# Patient Record
Sex: MALE | Race: WHITE | Employment: OTHER | URBAN - METROPOLITAN AREA
[De-identification: names, ages, dates, MRNs, and addresses within clinical notes are randomized per-mention and may not be internally consistent; named-entity substitution may affect disease eponyms.]

---

## 2017-01-03 ENCOUNTER — TELEPHONE (OUTPATIENT)
Dept: INTERNAL MEDICINE CLINIC | Age: 82
End: 2017-01-03

## 2017-01-09 ENCOUNTER — TELEPHONE (OUTPATIENT)
Dept: INTERNAL MEDICINE CLINIC | Age: 82
End: 2017-01-09

## 2017-01-09 DIAGNOSIS — N28.9 RENAL INSUFFICIENCY: Primary | ICD-10-CM

## 2017-01-12 ENCOUNTER — HOSPITAL ENCOUNTER (OUTPATIENT)
Dept: GENERAL RADIOLOGY | Age: 82
Discharge: HOME | End: 2017-01-12
Attending: INTERNAL MEDICINE | Admitting: INTERNAL MEDICINE

## 2017-01-12 LAB
ALBUMIN SERPL-MCNC: 4.1 G/DL (ref 3.4–5)
ANION GAP SERPL CALCULATED.3IONS-SCNC: 14 MMOL/L (ref 3–16)
BUN BLDV-MCNC: 22 MG/DL (ref 7–20)
CALCIUM SERPL-MCNC: 9.7 MG/DL (ref 8.3–10.6)
CHLORIDE BLD-SCNC: 103 MMOL/L (ref 99–110)
CO2: 25 MMOL/L (ref 21–32)
CREAT SERPL-MCNC: 2.2 MG/DL (ref 0.8–1.3)
GFR AFRICAN AMERICAN: 35
GFR NON-AFRICAN AMERICAN: 29
GLUCOSE BLD-MCNC: 115 MG/DL (ref 70–99)
PHOSPHORUS: 3.5 MG/DL (ref 2.5–4.9)
POTASSIUM SERPL-SCNC: 4.2 MMOL/L (ref 3.5–5.1)
SODIUM BLD-SCNC: 142 MMOL/L (ref 136–145)

## 2017-02-16 RX ORDER — GLIPIZIDE 10 MG/1
TABLET ORAL
Qty: 30 TABLET | Refills: 3 | Status: SHIPPED | OUTPATIENT
Start: 2017-02-16 | End: 2017-04-21 | Stop reason: SDUPTHER

## 2017-03-03 ENCOUNTER — HOSPITAL ENCOUNTER (OUTPATIENT)
Dept: GENERAL RADIOLOGY | Age: 82
Discharge: HOME | End: 2017-03-03
Attending: INTERNAL MEDICINE | Admitting: INTERNAL MEDICINE

## 2017-03-03 LAB
ALBUMIN SERPL-MCNC: 3.8 G/DL (ref 3.4–5)
ANION GAP SERPL CALCULATED.3IONS-SCNC: 19 MMOL/L (ref 3–16)
BUN BLDV-MCNC: 39 MG/DL (ref 7–20)
CALCIUM SERPL-MCNC: 9.5 MG/DL (ref 8.3–10.6)
CHLORIDE BLD-SCNC: 104 MMOL/L (ref 99–110)
CO2: 21 MMOL/L (ref 21–32)
CREAT SERPL-MCNC: 3 MG/DL (ref 0.8–1.3)
GFR AFRICAN AMERICAN: 24
GFR NON-AFRICAN AMERICAN: 20
GLUCOSE BLD-MCNC: 159 MG/DL (ref 70–99)
PHOSPHORUS: 3.7 MG/DL (ref 2.5–4.9)
POTASSIUM SERPL-SCNC: 4.2 MMOL/L (ref 3.5–5.1)
SODIUM BLD-SCNC: 144 MMOL/L (ref 136–145)

## 2017-03-07 ENCOUNTER — OFFICE VISIT (OUTPATIENT)
Dept: DERMATOLOGY | Age: 82
End: 2017-03-07

## 2017-03-07 DIAGNOSIS — L82.1 SEBORRHEIC KERATOSES: ICD-10-CM

## 2017-03-07 DIAGNOSIS — L57.0 AK (ACTINIC KERATOSIS): ICD-10-CM

## 2017-03-07 DIAGNOSIS — D22.9 BENIGN NEVUS: ICD-10-CM

## 2017-03-07 DIAGNOSIS — D48.5 NEOPLASM OF UNCERTAIN BEHAVIOR OF SKIN: Primary | ICD-10-CM

## 2017-03-07 PROCEDURE — 17003 DESTRUCT PREMALG LES 2-14: CPT | Performed by: DERMATOLOGY

## 2017-03-07 PROCEDURE — G8484 FLU IMMUNIZE NO ADMIN: HCPCS | Performed by: DERMATOLOGY

## 2017-03-07 PROCEDURE — G8420 CALC BMI NORM PARAMETERS: HCPCS | Performed by: DERMATOLOGY

## 2017-03-07 PROCEDURE — G8427 DOCREV CUR MEDS BY ELIG CLIN: HCPCS | Performed by: DERMATOLOGY

## 2017-03-07 PROCEDURE — 17000 DESTRUCT PREMALG LESION: CPT | Performed by: DERMATOLOGY

## 2017-03-07 PROCEDURE — 1123F ACP DISCUSS/DSCN MKR DOCD: CPT | Performed by: DERMATOLOGY

## 2017-03-07 PROCEDURE — 11100 PR BIOPSY OF SKIN LESION: CPT | Performed by: DERMATOLOGY

## 2017-03-07 PROCEDURE — 99213 OFFICE O/P EST LOW 20 MIN: CPT | Performed by: DERMATOLOGY

## 2017-03-07 PROCEDURE — 1036F TOBACCO NON-USER: CPT | Performed by: DERMATOLOGY

## 2017-03-07 PROCEDURE — 4040F PNEUMOC VAC/ADMIN/RCVD: CPT | Performed by: DERMATOLOGY

## 2017-03-13 ENCOUNTER — TELEPHONE (OUTPATIENT)
Dept: DERMATOLOGY | Age: 82
End: 2017-03-13

## 2017-03-21 ENCOUNTER — OFFICE VISIT (OUTPATIENT)
Dept: DERMATOLOGY | Age: 82
End: 2017-03-21

## 2017-03-21 DIAGNOSIS — C44.519 BASAL CELL CARCINOMA OF BACK: Primary | ICD-10-CM

## 2017-03-21 PROCEDURE — 1036F TOBACCO NON-USER: CPT | Performed by: DERMATOLOGY

## 2017-03-21 PROCEDURE — 17261 DSTRJ MAL LES T/A/L .6-1.0CM: CPT | Performed by: DERMATOLOGY

## 2017-04-03 ENCOUNTER — TELEPHONE (OUTPATIENT)
Dept: INTERNAL MEDICINE CLINIC | Age: 82
End: 2017-04-03

## 2017-04-03 ENCOUNTER — HOSPITAL ENCOUNTER (OUTPATIENT)
Dept: ULTRASOUND IMAGING | Age: 82
Discharge: HOME | End: 2017-04-03
Attending: INTERNAL MEDICINE | Admitting: INTERNAL MEDICINE

## 2017-04-03 DIAGNOSIS — N18.4 CHRONIC KIDNEY DISEASE, STAGE IV (SEVERE) (HCC): ICD-10-CM

## 2017-04-10 ENCOUNTER — HOSPITAL ENCOUNTER (OUTPATIENT)
Dept: CT IMAGING | Age: 82
Discharge: HOME | End: 2017-04-10
Attending: INTERNAL MEDICINE | Admitting: INTERNAL MEDICINE

## 2017-04-10 DIAGNOSIS — N18.4 CHRONIC KIDNEY DISEASE, STAGE IV (SEVERE) (HCC): ICD-10-CM

## 2017-04-10 DIAGNOSIS — R93.89 ABNORMAL FINDINGS ON DIAGNOSTIC IMAGING OF OTHER SPECIFIED BODY STRUCTURES: ICD-10-CM

## 2017-04-12 ENCOUNTER — HOSPITAL ENCOUNTER (OUTPATIENT)
Dept: GENERAL RADIOLOGY | Age: 82
Discharge: HOME | End: 2017-04-12
Attending: INTERNAL MEDICINE | Admitting: INTERNAL MEDICINE

## 2017-04-12 LAB
ALBUMIN SERPL-MCNC: 4 G/DL (ref 3.4–5)
ANION GAP SERPL CALCULATED.3IONS-SCNC: 24 MMOL/L (ref 3–16)
BACTERIA: ABNORMAL /HPF
BASOPHILS ABSOLUTE: 0.2 K/UL (ref 0–0.2)
BASOPHILS RELATIVE PERCENT: 1 %
BILIRUBIN URINE: NEGATIVE
BLOOD, URINE: ABNORMAL
BUN BLDV-MCNC: 58 MG/DL (ref 7–20)
C3 COMPLEMENT: 198.6 MG/DL (ref 90–180)
C4 COMPLEMENT: 52.8 MG/DL (ref 10–40)
CALCIUM SERPL-MCNC: 10.2 MG/DL (ref 8.3–10.6)
CASTS: ABNORMAL /LPF
CHLORIDE BLD-SCNC: 97 MMOL/L (ref 99–110)
CLARITY: CLEAR
CO2: 20 MMOL/L (ref 21–32)
COLOR: YELLOW
CREAT SERPL-MCNC: 3.7 MG/DL (ref 0.8–1.3)
CREATININE URINE: 124.9 MG/DL (ref 39–259)
EOSINOPHILS ABSOLUTE: 0.2 K/UL (ref 0–0.6)
EOSINOPHILS RELATIVE PERCENT: 1 %
GFR AFRICAN AMERICAN: 19
GFR NON-AFRICAN AMERICAN: 16
GLUCOSE BLD-MCNC: 146 MG/DL (ref 70–99)
GLUCOSE URINE: NEGATIVE MG/DL
HAV IGM SER IA-ACNC: NORMAL
HCT VFR BLD CALC: 44 % (ref 40.5–52.5)
HEMATOLOGY PATH CONSULT: YES
HEMOGLOBIN: 14.3 G/DL (ref 13.5–17.5)
HEPATITIS B CORE IGM ANTIBODY: NORMAL
HEPATITIS B SURFACE ANTIGEN INTERPRETATION: NORMAL
HEPATITIS C ANTIBODY INTERPRETATION: NORMAL
KETONES, URINE: ABNORMAL MG/DL
LEUKOCYTE ESTERASE, URINE: NEGATIVE
LYMPHOCYTES ABSOLUTE: 7.3 K/UL (ref 1–5.1)
LYMPHOCYTES RELATIVE PERCENT: 35 %
MCH RBC QN AUTO: 28.9 PG (ref 26–34)
MCHC RBC AUTO-ENTMCNC: 32.4 G/DL (ref 31–36)
MCV RBC AUTO: 89.2 FL (ref 80–100)
MICROSCOPIC EXAMINATION: YES
MONOCYTES ABSOLUTE: 1 K/UL (ref 0–1.3)
MONOCYTES RELATIVE PERCENT: 5 %
NEUTROPHILS ABSOLUTE: 12.1 K/UL (ref 1.7–7.7)
NEUTROPHILS RELATIVE PERCENT: 58 %
NITRITE, URINE: NEGATIVE
PARATHYROID HORMONE INTACT: 27.2 PG/ML (ref 14–72)
PDW BLD-RTO: 15.1 % (ref 12.4–15.4)
PH UA: 5.5
PHOSPHORUS: 3 MG/DL (ref 2.5–4.9)
PLATELET # BLD: 288 K/UL (ref 135–450)
PLATELET SLIDE REVIEW: ADEQUATE
PMV BLD AUTO: 8.3 FL (ref 5–10.5)
POTASSIUM SERPL-SCNC: 4.8 MMOL/L (ref 3.5–5.1)
PROTEIN PROTEIN: 0.13 G/DL
PROTEIN PROTEIN: 134 MG/DL
PROTEIN UA: 100 MG/DL
RBC # BLD: 4.93 M/UL (ref 4.2–5.9)
RBC # BLD: NORMAL 10*6/UL
RBC UA: ABNORMAL /HPF (ref 0–2)
SLIDE REVIEW: ABNORMAL
SODIUM BLD-SCNC: 141 MMOL/L (ref 136–145)
SPECIFIC GRAVITY UA: 1.02
UROBILINOGEN, URINE: 0.2 E.U./DL
VITAMIN D 25-HYDROXY: 58.4 NG/ML
WBC # BLD: 20.8 K/UL (ref 4–11)
WBC UA: ABNORMAL /HPF (ref 0–5)

## 2017-04-13 LAB
ANA INTERPRETATION: NORMAL
ANTI-NUCLEAR ANTIBODY (ANA): NEGATIVE
ESTIMATED AVERAGE GLUCOSE: 162.8 MG/DL
HBA1C MFR BLD: 7.3 %
HEMATOLOGY PATH CONSULT: NORMAL

## 2017-04-14 LAB
ALBUMIN SERPL-MCNC: 3.1 G/DL (ref 3.1–4.9)
ALPHA-1-GLOBULIN: 0.5 G/DL (ref 0.2–0.4)
ALPHA-2-GLOBULIN: 1.5 G/DL (ref 0.4–1.1)
ANCA IFA: NORMAL
BETA GLOBULIN: 0 AU/ML (ref 0–19)
BETA GLOBULIN: 1.1 G/DL (ref 0.9–1.6)
GAMMA GLOBULIN: 0.6 G/DL (ref 0.6–1.8)
KAPPA, FREE LIGHT CHAINS, SERUM: 53.06 MG/L (ref 3.3–19.4)
KAPPA/LAMBDA RATIO: 1.65 (ref 0.26–1.65)
KAPPA/LAMBDA TEST COMMENT: ABNORMAL
LAMBDA, FREE LIGHT CHAINS, SERUM: 32.09 MG/L (ref 5.71–26.3)
TOTAL PROTEIN: 6.7 G/DL (ref 6.4–8.2)

## 2017-04-17 LAB
SPE/IFE INTERPRETATION: NORMAL
URINE ELECTROPHORESIS INTERP: NORMAL

## 2017-04-20 ENCOUNTER — TELEPHONE (OUTPATIENT)
Dept: CASE MANAGEMENT | Age: 82
End: 2017-04-20

## 2017-04-20 ENCOUNTER — OFFICE VISIT (OUTPATIENT)
Dept: INTERNAL MEDICINE CLINIC | Age: 82
End: 2017-04-20

## 2017-04-20 VITALS
BODY MASS INDEX: 22.44 KG/M2 | TEMPERATURE: 97.7 F | SYSTOLIC BLOOD PRESSURE: 120 MMHG | WEIGHT: 143 LBS | HEIGHT: 67 IN | DIASTOLIC BLOOD PRESSURE: 72 MMHG

## 2017-04-20 DIAGNOSIS — E11.65 TYPE 2 DIABETES MELLITUS WITH HYPERGLYCEMIA, UNSPECIFIED LONG TERM INSULIN USE STATUS: ICD-10-CM

## 2017-04-20 DIAGNOSIS — E88.81 DYSMETABOLIC SYNDROME X: ICD-10-CM

## 2017-04-20 DIAGNOSIS — E78.2 MIXED HYPERLIPIDEMIA: ICD-10-CM

## 2017-04-20 DIAGNOSIS — I10 ESSENTIAL HYPERTENSION: ICD-10-CM

## 2017-04-20 DIAGNOSIS — G25.0 BENIGN ESSENTIAL TREMOR: ICD-10-CM

## 2017-04-20 DIAGNOSIS — R01.1 MURMUR, CARDIAC: ICD-10-CM

## 2017-04-20 DIAGNOSIS — R00.1 BRADYCARDIA: ICD-10-CM

## 2017-04-20 DIAGNOSIS — N20.0 KIDNEY STONE: Primary | ICD-10-CM

## 2017-04-20 DIAGNOSIS — N40.0 BENIGN PROSTATIC HYPERPLASIA WITHOUT LOWER URINARY TRACT SYMPTOMS, UNSPECIFIED MORPHOLOGY: ICD-10-CM

## 2017-04-20 DIAGNOSIS — Z01.818 PRE-OP EXAM: ICD-10-CM

## 2017-04-20 DIAGNOSIS — I34.1 MVP (MITRAL VALVE PROLAPSE): ICD-10-CM

## 2017-04-20 DIAGNOSIS — N28.9 RENAL INSUFFICIENCY: ICD-10-CM

## 2017-04-20 PROCEDURE — 99214 OFFICE O/P EST MOD 30 MIN: CPT | Performed by: NURSE PRACTITIONER

## 2017-04-20 PROCEDURE — G8419 CALC BMI OUT NRM PARAM NOF/U: HCPCS | Performed by: NURSE PRACTITIONER

## 2017-04-20 PROCEDURE — 93000 ELECTROCARDIOGRAM COMPLETE: CPT | Performed by: NURSE PRACTITIONER

## 2017-04-20 PROCEDURE — 4040F PNEUMOC VAC/ADMIN/RCVD: CPT | Performed by: NURSE PRACTITIONER

## 2017-04-20 PROCEDURE — 1036F TOBACCO NON-USER: CPT | Performed by: NURSE PRACTITIONER

## 2017-04-20 PROCEDURE — 1123F ACP DISCUSS/DSCN MKR DOCD: CPT | Performed by: NURSE PRACTITIONER

## 2017-04-20 PROCEDURE — G8427 DOCREV CUR MEDS BY ELIG CLIN: HCPCS | Performed by: NURSE PRACTITIONER

## 2017-04-21 RX ORDER — GLIPIZIDE 10 MG/1
TABLET ORAL
Qty: 30 TABLET | Refills: 5 | Status: SHIPPED | OUTPATIENT
Start: 2017-04-21 | End: 2017-12-18 | Stop reason: SDUPTHER

## 2017-05-11 ENCOUNTER — HOSPITAL ENCOUNTER (OUTPATIENT)
Dept: GENERAL RADIOLOGY | Age: 82
Discharge: HOME | End: 2017-05-11
Attending: INTERNAL MEDICINE | Admitting: INTERNAL MEDICINE

## 2017-05-11 LAB
ALBUMIN SERPL-MCNC: 3.9 G/DL (ref 3.4–5)
ANION GAP SERPL CALCULATED.3IONS-SCNC: 13 MMOL/L (ref 3–16)
BASOPHILS ABSOLUTE: 0 K/UL (ref 0–0.2)
BASOPHILS RELATIVE PERCENT: 0 %
BUN BLDV-MCNC: 23 MG/DL (ref 7–20)
CALCIUM SERPL-MCNC: 9.4 MG/DL (ref 8.3–10.6)
CHLORIDE BLD-SCNC: 100 MMOL/L (ref 99–110)
CO2: 26 MMOL/L (ref 21–32)
CREAT SERPL-MCNC: 1.8 MG/DL (ref 0.8–1.3)
EOSINOPHILS ABSOLUTE: 0.2 K/UL (ref 0–0.6)
EOSINOPHILS RELATIVE PERCENT: 2 %
GFR AFRICAN AMERICAN: 44
GFR NON-AFRICAN AMERICAN: 36
GLUCOSE BLD-MCNC: 195 MG/DL (ref 70–99)
HCT VFR BLD CALC: 38.2 % (ref 40.5–52.5)
HEMATOLOGY PATH CONSULT: NO
HEMOGLOBIN: 12.4 G/DL (ref 13.5–17.5)
LYMPHOCYTES ABSOLUTE: 5.9 K/UL (ref 1–5.1)
LYMPHOCYTES RELATIVE PERCENT: 59 %
MCH RBC QN AUTO: 29.5 PG (ref 26–34)
MCHC RBC AUTO-ENTMCNC: 32.6 G/DL (ref 31–36)
MCV RBC AUTO: 90.4 FL (ref 80–100)
MONOCYTES ABSOLUTE: 0.4 K/UL (ref 0–1.3)
MONOCYTES RELATIVE PERCENT: 4 %
NEUTROPHILS ABSOLUTE: 3.5 K/UL (ref 1.7–7.7)
NEUTROPHILS RELATIVE PERCENT: 35 %
PDW BLD-RTO: 16.3 % (ref 12.4–15.4)
PHOSPHORUS: 2.6 MG/DL (ref 2.5–4.9)
PLATELET # BLD: 134 K/UL (ref 135–450)
PLATELET SLIDE REVIEW: ABNORMAL
PMV BLD AUTO: 8.6 FL (ref 5–10.5)
POTASSIUM SERPL-SCNC: 3.9 MMOL/L (ref 3.5–5.1)
RBC # BLD: 4.22 M/UL (ref 4.2–5.9)
SLIDE REVIEW: ABNORMAL
SODIUM BLD-SCNC: 139 MMOL/L (ref 136–145)
WBC # BLD: 10 K/UL (ref 4–11)

## 2017-05-23 DIAGNOSIS — E11.65 TYPE 2 DIABETES MELLITUS WITH HYPERGLYCEMIA, UNSPECIFIED LONG TERM INSULIN USE STATUS: ICD-10-CM

## 2017-05-23 RX ORDER — CALCIUM CITRATE/VITAMIN D3 200MG-6.25
TABLET ORAL
Qty: 100 STRIP | Refills: 2 | Status: SHIPPED | OUTPATIENT
Start: 2017-05-23 | End: 2018-01-28 | Stop reason: SDUPTHER

## 2017-06-22 ENCOUNTER — TELEPHONE (OUTPATIENT)
Dept: INTERNAL MEDICINE CLINIC | Age: 82
End: 2017-06-22

## 2017-06-22 ENCOUNTER — TELEPHONE (OUTPATIENT)
Dept: PULMONOLOGY | Age: 82
End: 2017-06-22

## 2017-06-22 DIAGNOSIS — R91.1 LUNG NODULE: Primary | ICD-10-CM

## 2017-06-27 ENCOUNTER — OFFICE VISIT (OUTPATIENT)
Dept: PULMONOLOGY | Age: 82
End: 2017-06-27

## 2017-06-27 VITALS
WEIGHT: 153 LBS | HEART RATE: 51 BPM | TEMPERATURE: 97 F | OXYGEN SATURATION: 97 % | HEIGHT: 66 IN | BODY MASS INDEX: 24.59 KG/M2 | SYSTOLIC BLOOD PRESSURE: 134 MMHG | RESPIRATION RATE: 16 BRPM | DIASTOLIC BLOOD PRESSURE: 69 MMHG

## 2017-06-27 DIAGNOSIS — R91.1 PULMONARY NODULE: Primary | ICD-10-CM

## 2017-06-27 PROCEDURE — 1123F ACP DISCUSS/DSCN MKR DOCD: CPT | Performed by: INTERNAL MEDICINE

## 2017-06-27 PROCEDURE — 4040F PNEUMOC VAC/ADMIN/RCVD: CPT | Performed by: INTERNAL MEDICINE

## 2017-06-27 PROCEDURE — G8427 DOCREV CUR MEDS BY ELIG CLIN: HCPCS | Performed by: INTERNAL MEDICINE

## 2017-06-27 PROCEDURE — 99203 OFFICE O/P NEW LOW 30 MIN: CPT | Performed by: INTERNAL MEDICINE

## 2017-06-27 PROCEDURE — G8420 CALC BMI NORM PARAMETERS: HCPCS | Performed by: INTERNAL MEDICINE

## 2017-06-27 PROCEDURE — 1036F TOBACCO NON-USER: CPT | Performed by: INTERNAL MEDICINE

## 2017-06-27 RX ORDER — ACETAMINOPHEN 160 MG
TABLET,DISINTEGRATING ORAL
COMMUNITY

## 2017-06-29 ENCOUNTER — HOSPITAL ENCOUNTER (OUTPATIENT)
Dept: CT IMAGING | Age: 82
Discharge: HOME | End: 2017-06-29
Attending: INTERNAL MEDICINE | Admitting: INTERNAL MEDICINE

## 2017-06-29 ENCOUNTER — OFFICE VISIT (OUTPATIENT)
Dept: INTERNAL MEDICINE CLINIC | Age: 82
End: 2017-06-29

## 2017-06-29 VITALS
BODY MASS INDEX: 24.37 KG/M2 | HEART RATE: 73 BPM | SYSTOLIC BLOOD PRESSURE: 116 MMHG | WEIGHT: 151 LBS | OXYGEN SATURATION: 96 % | DIASTOLIC BLOOD PRESSURE: 82 MMHG

## 2017-06-29 DIAGNOSIS — I10 ESSENTIAL HYPERTENSION: Primary | ICD-10-CM

## 2017-06-29 DIAGNOSIS — Z23 NEED FOR PROPHYLACTIC VACCINATION AGAINST STREPTOCOCCUS PNEUMONIAE (PNEUMOCOCCUS): ICD-10-CM

## 2017-06-29 DIAGNOSIS — R91.1 PULMONARY NODULE: ICD-10-CM

## 2017-06-29 DIAGNOSIS — E11.65 TYPE 2 DIABETES MELLITUS WITH HYPERGLYCEMIA, UNSPECIFIED LONG TERM INSULIN USE STATUS: ICD-10-CM

## 2017-06-29 DIAGNOSIS — R91.1 SOLITARY PULMONARY NODULE: ICD-10-CM

## 2017-06-29 DIAGNOSIS — E78.2 MIXED HYPERLIPIDEMIA: ICD-10-CM

## 2017-06-29 DIAGNOSIS — N28.9 RENAL INSUFFICIENCY: ICD-10-CM

## 2017-06-29 PROCEDURE — G8420 CALC BMI NORM PARAMETERS: HCPCS | Performed by: INTERNAL MEDICINE

## 2017-06-29 PROCEDURE — 1036F TOBACCO NON-USER: CPT | Performed by: INTERNAL MEDICINE

## 2017-06-29 PROCEDURE — G8427 DOCREV CUR MEDS BY ELIG CLIN: HCPCS | Performed by: INTERNAL MEDICINE

## 2017-06-29 PROCEDURE — 4040F PNEUMOC VAC/ADMIN/RCVD: CPT | Performed by: INTERNAL MEDICINE

## 2017-06-29 PROCEDURE — 1123F ACP DISCUSS/DSCN MKR DOCD: CPT | Performed by: INTERNAL MEDICINE

## 2017-06-29 PROCEDURE — 90732 PPSV23 VACC 2 YRS+ SUBQ/IM: CPT | Performed by: INTERNAL MEDICINE

## 2017-06-29 PROCEDURE — G0009 ADMIN PNEUMOCOCCAL VACCINE: HCPCS | Performed by: INTERNAL MEDICINE

## 2017-06-29 PROCEDURE — 99214 OFFICE O/P EST MOD 30 MIN: CPT | Performed by: INTERNAL MEDICINE

## 2017-06-29 ASSESSMENT — ENCOUNTER SYMPTOMS
BACK PAIN: 0
CHEST TIGHTNESS: 0
DIARRHEA: 0
COUGH: 0
ABDOMINAL DISTENTION: 0
WHEEZING: 0
SHORTNESS OF BREATH: 0
NAUSEA: 0
CONSTIPATION: 0
VOMITING: 0

## 2017-06-29 ASSESSMENT — PATIENT HEALTH QUESTIONNAIRE - PHQ9
SUM OF ALL RESPONSES TO PHQ QUESTIONS 1-9: 2
2. FEELING DOWN, DEPRESSED OR HOPELESS: 1
SUM OF ALL RESPONSES TO PHQ9 QUESTIONS 1 & 2: 2
1. LITTLE INTEREST OR PLEASURE IN DOING THINGS: 1

## 2017-06-30 ENCOUNTER — TELEPHONE (OUTPATIENT)
Dept: PULMONOLOGY | Age: 82
End: 2017-06-30

## 2017-06-30 DIAGNOSIS — R91.1 PULMONARY NODULE: Primary | ICD-10-CM

## 2017-07-03 RX ORDER — PROPRANOLOL HYDROCHLORIDE 20 MG/1
TABLET ORAL
Qty: 90 TABLET | Refills: 0 | Status: SHIPPED | OUTPATIENT
Start: 2017-07-03 | End: 2017-09-29 | Stop reason: SDUPTHER

## 2017-07-05 ENCOUNTER — HOSPITAL ENCOUNTER (OUTPATIENT)
Dept: GENERAL RADIOLOGY | Age: 82
Discharge: HOME | End: 2017-07-05
Attending: INTERNAL MEDICINE | Admitting: INTERNAL MEDICINE

## 2017-07-05 DIAGNOSIS — E78.2 MIXED HYPERLIPIDEMIA: ICD-10-CM

## 2017-07-05 LAB
ALBUMIN SERPL-MCNC: 4.2 G/DL (ref 3.4–5)
ALBUMIN SERPL-MCNC: 4.4 G/DL (ref 3.4–5)
ALP BLD-CCNC: 43 U/L (ref 40–129)
ALT SERPL-CCNC: 15 U/L (ref 10–40)
ANION GAP SERPL CALCULATED.3IONS-SCNC: 17 MMOL/L (ref 3–16)
AST SERPL-CCNC: 20 U/L (ref 15–37)
BILIRUB SERPL-MCNC: 0.7 MG/DL (ref 0–1)
BILIRUBIN DIRECT: <0.2 MG/DL (ref 0–0.3)
BILIRUBIN, INDIRECT: NORMAL MG/DL (ref 0–1)
BUN BLDV-MCNC: 37 MG/DL (ref 7–20)
CALCIUM SERPL-MCNC: 9.9 MG/DL (ref 8.3–10.6)
CHLORIDE BLD-SCNC: 101 MMOL/L (ref 99–110)
CHOLESTEROL, TOTAL: 111 MG/DL (ref 0–199)
CO2: 23 MMOL/L (ref 21–32)
CREAT SERPL-MCNC: 2.3 MG/DL (ref 0.8–1.3)
GFR AFRICAN AMERICAN: 33
GFR NON-AFRICAN AMERICAN: 27
GLUCOSE BLD-MCNC: 138 MG/DL (ref 70–99)
HDLC SERPL-MCNC: 37 MG/DL (ref 40–60)
LDL CHOLESTEROL CALCULATED: 37 MG/DL
PHOSPHORUS: 3.6 MG/DL (ref 2.5–4.9)
POTASSIUM SERPL-SCNC: 4.1 MMOL/L (ref 3.5–5.1)
SODIUM BLD-SCNC: 141 MMOL/L (ref 136–145)
TOTAL PROTEIN: 6.6 G/DL (ref 6.4–8.2)
TRIGL SERPL-MCNC: 187 MG/DL (ref 0–150)
VLDLC SERPL CALC-MCNC: 37 MG/DL

## 2017-07-14 ENCOUNTER — OFFICE VISIT (OUTPATIENT)
Dept: INTERNAL MEDICINE CLINIC | Age: 82
End: 2017-07-14

## 2017-07-14 VITALS
DIASTOLIC BLOOD PRESSURE: 84 MMHG | BODY MASS INDEX: 24.05 KG/M2 | HEART RATE: 76 BPM | SYSTOLIC BLOOD PRESSURE: 120 MMHG | OXYGEN SATURATION: 98 % | WEIGHT: 149 LBS

## 2017-07-14 DIAGNOSIS — H61.23 BILATERAL IMPACTED CERUMEN: Primary | ICD-10-CM

## 2017-07-14 PROCEDURE — 1036F TOBACCO NON-USER: CPT | Performed by: NURSE PRACTITIONER

## 2017-07-14 PROCEDURE — 99999 PR OFFICE/OUTPT VISIT,PROCEDURE ONLY: CPT | Performed by: NURSE PRACTITIONER

## 2017-07-14 PROCEDURE — 69209 REMOVE IMPACTED EAR WAX UNI: CPT | Performed by: NURSE PRACTITIONER

## 2017-09-12 ENCOUNTER — OFFICE VISIT (OUTPATIENT)
Dept: DERMATOLOGY | Age: 82
End: 2017-09-12

## 2017-09-12 DIAGNOSIS — D22.9 BENIGN NEVUS: ICD-10-CM

## 2017-09-12 DIAGNOSIS — L82.1 SEBORRHEIC KERATOSES: ICD-10-CM

## 2017-09-12 DIAGNOSIS — Z85.828 HISTORY OF BASAL CELL CANCER: ICD-10-CM

## 2017-09-12 DIAGNOSIS — L57.0 AK (ACTINIC KERATOSIS): Primary | ICD-10-CM

## 2017-09-12 DIAGNOSIS — D48.5 NEOPLASM OF UNCERTAIN BEHAVIOR OF SKIN: ICD-10-CM

## 2017-09-12 PROCEDURE — 17000 DESTRUCT PREMALG LESION: CPT | Performed by: DERMATOLOGY

## 2017-09-12 PROCEDURE — G8427 DOCREV CUR MEDS BY ELIG CLIN: HCPCS | Performed by: DERMATOLOGY

## 2017-09-12 PROCEDURE — 1123F ACP DISCUSS/DSCN MKR DOCD: CPT | Performed by: DERMATOLOGY

## 2017-09-12 PROCEDURE — 99214 OFFICE O/P EST MOD 30 MIN: CPT | Performed by: DERMATOLOGY

## 2017-09-12 PROCEDURE — 1036F TOBACCO NON-USER: CPT | Performed by: DERMATOLOGY

## 2017-09-12 PROCEDURE — 4040F PNEUMOC VAC/ADMIN/RCVD: CPT | Performed by: DERMATOLOGY

## 2017-09-12 PROCEDURE — G8420 CALC BMI NORM PARAMETERS: HCPCS | Performed by: DERMATOLOGY

## 2017-09-12 PROCEDURE — 17003 DESTRUCT PREMALG LES 2-14: CPT | Performed by: DERMATOLOGY

## 2017-09-12 RX ORDER — ACETAMINOPHEN 500 MG
500 TABLET ORAL EVERY 6 HOURS PRN
COMMUNITY

## 2017-09-14 ENCOUNTER — TELEPHONE (OUTPATIENT)
Dept: DERMATOLOGY | Age: 82
End: 2017-09-14

## 2017-09-27 ENCOUNTER — HOSPITAL ENCOUNTER (OUTPATIENT)
Dept: GENERAL RADIOLOGY | Age: 82
Discharge: HOME | End: 2017-09-27
Attending: INTERNAL MEDICINE | Admitting: INTERNAL MEDICINE

## 2017-09-27 LAB
ALBUMIN SERPL-MCNC: 4.4 G/DL (ref 3.4–5)
ANION GAP SERPL CALCULATED.3IONS-SCNC: 15 MMOL/L (ref 3–16)
BUN BLDV-MCNC: 39 MG/DL (ref 7–20)
CALCIUM SERPL-MCNC: 10.1 MG/DL (ref 8.3–10.6)
CHLORIDE BLD-SCNC: 98 MMOL/L (ref 99–110)
CO2: 25 MMOL/L (ref 21–32)
CREAT SERPL-MCNC: 2.3 MG/DL (ref 0.8–1.3)
GFR AFRICAN AMERICAN: 33
GFR NON-AFRICAN AMERICAN: 27
GLUCOSE BLD-MCNC: 71 MG/DL (ref 70–99)
PHOSPHORUS: 3.5 MG/DL (ref 2.5–4.9)
POTASSIUM SERPL-SCNC: 4.7 MMOL/L (ref 3.5–5.1)
SODIUM BLD-SCNC: 138 MMOL/L (ref 136–145)

## 2017-09-29 RX ORDER — PROPRANOLOL HYDROCHLORIDE 20 MG/1
TABLET ORAL
Qty: 90 TABLET | Refills: 0 | Status: SHIPPED | OUTPATIENT
Start: 2017-09-29 | End: 2018-01-02 | Stop reason: SDUPTHER

## 2017-12-18 RX ORDER — GLIPIZIDE 10 MG/1
TABLET ORAL
Qty: 30 TABLET | Refills: 5 | Status: SHIPPED | OUTPATIENT
Start: 2017-12-18 | End: 2018-06-20 | Stop reason: SDUPTHER

## 2017-12-21 ENCOUNTER — OFFICE VISIT (OUTPATIENT)
Dept: INTERNAL MEDICINE CLINIC | Age: 82
End: 2017-12-21

## 2017-12-21 VITALS
HEART RATE: 72 BPM | SYSTOLIC BLOOD PRESSURE: 124 MMHG | DIASTOLIC BLOOD PRESSURE: 80 MMHG | OXYGEN SATURATION: 97 % | BODY MASS INDEX: 24.86 KG/M2 | WEIGHT: 154 LBS

## 2017-12-21 DIAGNOSIS — N28.9 RENAL INSUFFICIENCY: ICD-10-CM

## 2017-12-21 DIAGNOSIS — Z23 NEED FOR PROPHYLACTIC VACCINATION AND INOCULATION AGAINST INFLUENZA: ICD-10-CM

## 2017-12-21 DIAGNOSIS — I10 ESSENTIAL HYPERTENSION: Primary | ICD-10-CM

## 2017-12-21 DIAGNOSIS — N18.4 CHRONIC KIDNEY DISEASE (CKD), STAGE IV (SEVERE) (HCC): ICD-10-CM

## 2017-12-21 DIAGNOSIS — E78.2 MIXED HYPERLIPIDEMIA: ICD-10-CM

## 2017-12-21 DIAGNOSIS — E11.65 TYPE 2 DIABETES MELLITUS WITH HYPERGLYCEMIA, UNSPECIFIED LONG TERM INSULIN USE STATUS: ICD-10-CM

## 2017-12-21 LAB — HBA1C MFR BLD: 7 %

## 2017-12-21 PROCEDURE — 83036 HEMOGLOBIN GLYCOSYLATED A1C: CPT | Performed by: INTERNAL MEDICINE

## 2017-12-21 PROCEDURE — G0008 ADMIN INFLUENZA VIRUS VAC: HCPCS | Performed by: INTERNAL MEDICINE

## 2017-12-21 PROCEDURE — G8484 FLU IMMUNIZE NO ADMIN: HCPCS | Performed by: INTERNAL MEDICINE

## 2017-12-21 PROCEDURE — G8420 CALC BMI NORM PARAMETERS: HCPCS | Performed by: INTERNAL MEDICINE

## 2017-12-21 PROCEDURE — 90662 IIV NO PRSV INCREASED AG IM: CPT | Performed by: INTERNAL MEDICINE

## 2017-12-21 PROCEDURE — 1123F ACP DISCUSS/DSCN MKR DOCD: CPT | Performed by: INTERNAL MEDICINE

## 2017-12-21 PROCEDURE — 99214 OFFICE O/P EST MOD 30 MIN: CPT | Performed by: INTERNAL MEDICINE

## 2017-12-21 PROCEDURE — 4040F PNEUMOC VAC/ADMIN/RCVD: CPT | Performed by: INTERNAL MEDICINE

## 2017-12-21 PROCEDURE — G8427 DOCREV CUR MEDS BY ELIG CLIN: HCPCS | Performed by: INTERNAL MEDICINE

## 2017-12-21 PROCEDURE — 1036F TOBACCO NON-USER: CPT | Performed by: INTERNAL MEDICINE

## 2017-12-21 ASSESSMENT — ENCOUNTER SYMPTOMS
CONSTIPATION: 0
VOMITING: 0
DIARRHEA: 0
WHEEZING: 0
CHEST TIGHTNESS: 0
SHORTNESS OF BREATH: 0
NAUSEA: 0
ABDOMINAL DISTENTION: 0
COUGH: 0
BACK PAIN: 0

## 2017-12-21 NOTE — PROGRESS NOTES
Vaccine Information Sheet, \"Influenza - Inactivated\"  given to Renetta Mckeon, or parent/legal guardian of  Renetta Mckeon and verbalized understanding. Patient responses:    Have you ever had a reaction to a flu vaccine? No  Are you able to eat eggs without adverse effects? Yes  Do you have any current illness? No  Have you ever had Guillian Clearbrook Syndrome? No    Flu vaccine given per order. Please see immunization tab.
hepatosplenomegaly   Musculoskeletal: He exhibits no edema. Neurological: He is alert and oriented to person, place, and time. Skin: He is not diaphoretic. Psychiatric: He has a normal mood and affect. His behavior is normal. Judgment and thought content normal.   Vitals reviewed. Assessment:     Jorge Alberto Machuca was seen today for 6 month follow-up, hyperlipidemia, hypertension and diabetes. Diagnoses and associated orders for this visit:    Mixed hyperlipidemia  Stable. Continue current regimen  -    Essential hypertension  Stable. Continue current regimen  - Comprehensive Metabolic Panel; Future    Type 2 diabetes mellitus without complication (HCC)  Improved. Continue current regimen    Renal insufficiency  Improved.  F/u with Nephrology       Influenza vaccine today  Plan:      See above plan

## 2018-01-02 ENCOUNTER — TELEPHONE (OUTPATIENT)
Dept: PULMONOLOGY | Age: 83
End: 2018-01-02

## 2018-01-02 RX ORDER — PROPRANOLOL HYDROCHLORIDE 20 MG/1
TABLET ORAL
Qty: 90 TABLET | Refills: 0 | Status: SHIPPED | OUTPATIENT
Start: 2018-01-02 | End: 2018-04-02 | Stop reason: SDUPTHER

## 2018-01-02 NOTE — TELEPHONE ENCOUNTER
Patient cancelled appointment on 1/4/18 with  for 6 month CT fua. Reason: [address] to [facility] and no no availability at [facility] until March and patient did not want to wait until then    Patient did not reschedule appointment. Please advise when to reschedule patient        Last OV   Assessment: 6/27/17      · RLL 9 mm nodule on CT chest 4/10/2017- granuloma vs inflammatory vs malignancy. Favor granuloma    · 6 pack years smoking quit 1970s       Plan:       · CT chest without contrast to evaluate for other nodules/masses and LAD.  Repeat CT chest in 6 months if CT showed stable nodule with no other concerning findings

## 2018-01-03 ENCOUNTER — HOSPITAL ENCOUNTER (OUTPATIENT)
Dept: GENERAL RADIOLOGY | Age: 83
Discharge: HOME | End: 2018-01-03
Attending: INTERNAL MEDICINE | Admitting: INTERNAL MEDICINE

## 2018-01-03 ENCOUNTER — HOSPITAL ENCOUNTER (OUTPATIENT)
Dept: CT IMAGING | Age: 83
Discharge: HOME | End: 2018-01-03
Attending: INTERNAL MEDICINE | Admitting: INTERNAL MEDICINE

## 2018-01-03 DIAGNOSIS — R91.1 PULMONARY NODULE: ICD-10-CM

## 2018-01-03 DIAGNOSIS — R91.1 SOLITARY PULMONARY NODULE: ICD-10-CM

## 2018-01-03 LAB
ALBUMIN SERPL-MCNC: 4.3 G/DL (ref 3.4–5)
ANION GAP SERPL CALCULATED.3IONS-SCNC: 13 MMOL/L (ref 3–16)
BILIRUBIN URINE: NEGATIVE
BLOOD, URINE: ABNORMAL
BUN BLDV-MCNC: 22 MG/DL (ref 7–20)
CALCIUM SERPL-MCNC: 9.8 MG/DL (ref 8.3–10.6)
CHLORIDE BLD-SCNC: 102 MMOL/L (ref 99–110)
CLARITY: CLEAR
CO2: 27 MMOL/L (ref 21–32)
COLOR: YELLOW
CREAT SERPL-MCNC: 1.9 MG/DL (ref 0.8–1.3)
CREATININE URINE: 141 MG/DL (ref 39–259)
EPITHELIAL CELLS, UA: ABNORMAL /HPF
GFR AFRICAN AMERICAN: 41
GFR NON-AFRICAN AMERICAN: 34
GLUCOSE BLD-MCNC: 134 MG/DL (ref 70–99)
GLUCOSE URINE: 250 MG/DL
HCT VFR BLD CALC: 44.7 % (ref 40.5–52.5)
HEMOGLOBIN: 14.8 G/DL (ref 13.5–17.5)
KETONES, URINE: NEGATIVE MG/DL
LEUKOCYTE ESTERASE, URINE: NEGATIVE
MCH RBC QN AUTO: 29.4 PG (ref 26–34)
MCHC RBC AUTO-ENTMCNC: 33.1 G/DL (ref 31–36)
MCV RBC AUTO: 88.8 FL (ref 80–100)
MICROSCOPIC EXAMINATION: YES
NITRITE, URINE: NEGATIVE
PDW BLD-RTO: 15.2 % (ref 12.4–15.4)
PH UA: 6.5
PHOSPHORUS: 3.3 MG/DL (ref 2.5–4.9)
PLATELET # BLD: 140 K/UL (ref 135–450)
PMV BLD AUTO: 8.7 FL (ref 5–10.5)
POTASSIUM SERPL-SCNC: 4.1 MMOL/L (ref 3.5–5.1)
PROTEIN PROTEIN: 128 MG/DL
PROTEIN UA: 100 MG/DL
RBC # BLD: 5.03 M/UL (ref 4.2–5.9)
RBC UA: ABNORMAL /HPF (ref 0–2)
SODIUM BLD-SCNC: 142 MMOL/L (ref 136–145)
SPECIFIC GRAVITY UA: 1.02
UROBILINOGEN, URINE: 1 E.U./DL
WBC # BLD: 14.8 K/UL (ref 4–11)
WBC UA: ABNORMAL /HPF (ref 0–5)

## 2018-01-09 ENCOUNTER — OFFICE VISIT (OUTPATIENT)
Dept: PULMONOLOGY | Age: 83
End: 2018-01-09

## 2018-01-09 VITALS
WEIGHT: 155 LBS | SYSTOLIC BLOOD PRESSURE: 131 MMHG | RESPIRATION RATE: 16 BRPM | DIASTOLIC BLOOD PRESSURE: 73 MMHG | HEIGHT: 66 IN | OXYGEN SATURATION: 97 % | TEMPERATURE: 97.5 F | BODY MASS INDEX: 24.91 KG/M2 | HEART RATE: 87 BPM

## 2018-01-09 DIAGNOSIS — R91.1 PULMONARY NODULE: Primary | ICD-10-CM

## 2018-01-09 PROCEDURE — G8484 FLU IMMUNIZE NO ADMIN: HCPCS | Performed by: INTERNAL MEDICINE

## 2018-01-09 PROCEDURE — 1123F ACP DISCUSS/DSCN MKR DOCD: CPT | Performed by: INTERNAL MEDICINE

## 2018-01-09 PROCEDURE — 1036F TOBACCO NON-USER: CPT | Performed by: INTERNAL MEDICINE

## 2018-01-09 PROCEDURE — G8427 DOCREV CUR MEDS BY ELIG CLIN: HCPCS | Performed by: INTERNAL MEDICINE

## 2018-01-09 PROCEDURE — 4040F PNEUMOC VAC/ADMIN/RCVD: CPT | Performed by: INTERNAL MEDICINE

## 2018-01-09 PROCEDURE — 99213 OFFICE O/P EST LOW 20 MIN: CPT | Performed by: INTERNAL MEDICINE

## 2018-01-09 PROCEDURE — G8419 CALC BMI OUT NRM PARAM NOF/U: HCPCS | Performed by: INTERNAL MEDICINE

## 2018-01-09 NOTE — PATIENT INSTRUCTIONS
Please keep all of your future appointments scheduled by [facility] Delon SLOAN [facility]monary office.

## 2018-01-28 DIAGNOSIS — E11.65 TYPE 2 DIABETES MELLITUS WITH HYPERGLYCEMIA, UNSPECIFIED LONG TERM INSULIN USE STATUS: ICD-10-CM

## 2018-01-29 RX ORDER — CALCIUM CITRATE/VITAMIN D3 200MG-6.25
TABLET ORAL
Qty: 100 STRIP | Refills: 1 | Status: SHIPPED | OUTPATIENT
Start: 2018-01-29 | End: 2018-08-05 | Stop reason: SDUPTHER

## 2018-02-13 ENCOUNTER — OFFICE VISIT (OUTPATIENT)
Dept: DERMATOLOGY | Age: 83
End: 2018-02-13

## 2018-02-13 DIAGNOSIS — Z85.828 HISTORY OF BASAL CELL CANCER: ICD-10-CM

## 2018-02-13 DIAGNOSIS — L57.0 AK (ACTINIC KERATOSIS): Primary | ICD-10-CM

## 2018-02-13 DIAGNOSIS — L82.1 SEBORRHEIC KERATOSES: ICD-10-CM

## 2018-02-13 DIAGNOSIS — D22.9 BENIGN NEVUS: ICD-10-CM

## 2018-02-13 PROCEDURE — G8484 FLU IMMUNIZE NO ADMIN: HCPCS | Performed by: DERMATOLOGY

## 2018-02-13 PROCEDURE — 1123F ACP DISCUSS/DSCN MKR DOCD: CPT | Performed by: DERMATOLOGY

## 2018-02-13 PROCEDURE — 17000 DESTRUCT PREMALG LESION: CPT | Performed by: DERMATOLOGY

## 2018-02-13 PROCEDURE — 17003 DESTRUCT PREMALG LES 2-14: CPT | Performed by: DERMATOLOGY

## 2018-02-13 PROCEDURE — G8419 CALC BMI OUT NRM PARAM NOF/U: HCPCS | Performed by: DERMATOLOGY

## 2018-02-13 PROCEDURE — G8427 DOCREV CUR MEDS BY ELIG CLIN: HCPCS | Performed by: DERMATOLOGY

## 2018-02-13 PROCEDURE — 4040F PNEUMOC VAC/ADMIN/RCVD: CPT | Performed by: DERMATOLOGY

## 2018-02-13 PROCEDURE — 1036F TOBACCO NON-USER: CPT | Performed by: DERMATOLOGY

## 2018-02-13 PROCEDURE — 99214 OFFICE O/P EST MOD 30 MIN: CPT | Performed by: DERMATOLOGY

## 2018-04-02 RX ORDER — PROPRANOLOL HYDROCHLORIDE 20 MG/1
TABLET ORAL
Qty: 90 TABLET | Refills: 0 | Status: SHIPPED | OUTPATIENT
Start: 2018-04-02 | End: 2018-07-03 | Stop reason: SDUPTHER

## 2018-04-30 RX ORDER — ROSUVASTATIN CALCIUM 40 MG/1
TABLET, COATED ORAL
Qty: 90 TABLET | Refills: 2 | Status: SHIPPED | OUTPATIENT
Start: 2018-04-30 | End: 2019-01-24 | Stop reason: SDUPTHER

## 2018-06-12 ENCOUNTER — OFFICE VISIT (OUTPATIENT)
Dept: DERMATOLOGY | Age: 83
End: 2018-06-12

## 2018-06-12 DIAGNOSIS — L82.1 SEBORRHEIC KERATOSES: ICD-10-CM

## 2018-06-12 DIAGNOSIS — D22.9 BENIGN NEVUS: ICD-10-CM

## 2018-06-12 DIAGNOSIS — D48.5 NEOPLASM OF UNCERTAIN BEHAVIOR OF SKIN: ICD-10-CM

## 2018-06-12 DIAGNOSIS — L57.0 AK (ACTINIC KERATOSIS): Primary | ICD-10-CM

## 2018-06-12 DIAGNOSIS — D69.2 SENILE PURPURA (HCC): ICD-10-CM

## 2018-06-12 DIAGNOSIS — Z85.828 HISTORY OF BASAL CELL CANCER: ICD-10-CM

## 2018-06-12 PROCEDURE — G8427 DOCREV CUR MEDS BY ELIG CLIN: HCPCS | Performed by: DERMATOLOGY

## 2018-06-12 PROCEDURE — G8419 CALC BMI OUT NRM PARAM NOF/U: HCPCS | Performed by: DERMATOLOGY

## 2018-06-12 PROCEDURE — 4040F PNEUMOC VAC/ADMIN/RCVD: CPT | Performed by: DERMATOLOGY

## 2018-06-12 PROCEDURE — 1036F TOBACCO NON-USER: CPT | Performed by: DERMATOLOGY

## 2018-06-12 PROCEDURE — 17003 DESTRUCT PREMALG LES 2-14: CPT | Performed by: DERMATOLOGY

## 2018-06-12 PROCEDURE — 1123F ACP DISCUSS/DSCN MKR DOCD: CPT | Performed by: DERMATOLOGY

## 2018-06-12 PROCEDURE — 17000 DESTRUCT PREMALG LESION: CPT | Performed by: DERMATOLOGY

## 2018-06-12 PROCEDURE — 99214 OFFICE O/P EST MOD 30 MIN: CPT | Performed by: DERMATOLOGY

## 2018-06-15 RX ORDER — INSULIN GLARGINE 300 U/ML
10 INJECTION, SOLUTION SUBCUTANEOUS DAILY
Qty: 4.5 PEN | Refills: 2 | Status: SHIPPED | OUTPATIENT
Start: 2018-06-15 | End: 2019-06-17 | Stop reason: SDUPTHER

## 2018-06-15 RX ORDER — PEN NEEDLE, DIABETIC 31 GX5/16"
NEEDLE, DISPOSABLE MISCELLANEOUS
Qty: 1 EACH | Refills: 2 | Status: SHIPPED | OUTPATIENT
Start: 2018-06-15 | End: 2019-10-16 | Stop reason: SDUPTHER

## 2018-06-20 ENCOUNTER — TELEPHONE (OUTPATIENT)
Dept: DERMATOLOGY | Age: 83
End: 2018-06-20

## 2018-06-20 RX ORDER — GLIPIZIDE 10 MG/1
TABLET ORAL
Qty: 30 TABLET | Refills: 4 | Status: SHIPPED | OUTPATIENT
Start: 2018-06-20 | End: 2018-11-15 | Stop reason: SDUPTHER

## 2018-06-21 ENCOUNTER — OFFICE VISIT (OUTPATIENT)
Dept: INTERNAL MEDICINE CLINIC | Age: 83
End: 2018-06-21

## 2018-06-21 VITALS
HEART RATE: 70 BPM | BODY MASS INDEX: 25.5 KG/M2 | DIASTOLIC BLOOD PRESSURE: 74 MMHG | OXYGEN SATURATION: 98 % | WEIGHT: 158 LBS | SYSTOLIC BLOOD PRESSURE: 110 MMHG

## 2018-06-21 DIAGNOSIS — E78.2 MIXED HYPERLIPIDEMIA: ICD-10-CM

## 2018-06-21 DIAGNOSIS — I10 ESSENTIAL HYPERTENSION: ICD-10-CM

## 2018-06-21 DIAGNOSIS — E11.65 TYPE 2 DIABETES MELLITUS WITH HYPERGLYCEMIA, UNSPECIFIED LONG TERM INSULIN USE STATUS: Primary | ICD-10-CM

## 2018-06-21 DIAGNOSIS — N28.9 RENAL INSUFFICIENCY: ICD-10-CM

## 2018-06-21 PROCEDURE — 1123F ACP DISCUSS/DSCN MKR DOCD: CPT | Performed by: INTERNAL MEDICINE

## 2018-06-21 PROCEDURE — G8419 CALC BMI OUT NRM PARAM NOF/U: HCPCS | Performed by: INTERNAL MEDICINE

## 2018-06-21 PROCEDURE — 4040F PNEUMOC VAC/ADMIN/RCVD: CPT | Performed by: INTERNAL MEDICINE

## 2018-06-21 PROCEDURE — G8427 DOCREV CUR MEDS BY ELIG CLIN: HCPCS | Performed by: INTERNAL MEDICINE

## 2018-06-21 PROCEDURE — 1036F TOBACCO NON-USER: CPT | Performed by: INTERNAL MEDICINE

## 2018-06-21 PROCEDURE — 99214 OFFICE O/P EST MOD 30 MIN: CPT | Performed by: INTERNAL MEDICINE

## 2018-06-22 ENCOUNTER — HOSPITAL ENCOUNTER (OUTPATIENT)
Dept: GENERAL RADIOLOGY | Age: 83
Discharge: HOME | End: 2018-06-22
Attending: INTERNAL MEDICINE | Admitting: INTERNAL MEDICINE

## 2018-06-22 DIAGNOSIS — E78.2 MIXED HYPERLIPIDEMIA: ICD-10-CM

## 2018-06-22 DIAGNOSIS — E11.65 TYPE 2 DIABETES MELLITUS WITH HYPERGLYCEMIA, UNSPECIFIED LONG TERM INSULIN USE STATUS: ICD-10-CM

## 2018-06-22 LAB
CHOLESTEROL, TOTAL: 92 MG/DL (ref 0–199)
HDLC SERPL-MCNC: 33 MG/DL (ref 40–60)
LDL CHOLESTEROL CALCULATED: 26 MG/DL
TRIGL SERPL-MCNC: 165 MG/DL (ref 0–150)
VLDLC SERPL CALC-MCNC: 33 MG/DL

## 2018-06-23 LAB
ESTIMATED AVERAGE GLUCOSE: 203 MG/DL
HBA1C MFR BLD: 8.7 %

## 2018-07-03 RX ORDER — PROPRANOLOL HYDROCHLORIDE 20 MG/1
TABLET ORAL
Qty: 90 TABLET | Refills: 0 | Status: SHIPPED | OUTPATIENT
Start: 2018-07-03 | End: 2018-09-28 | Stop reason: SDUPTHER

## 2018-07-11 ENCOUNTER — HOSPITAL ENCOUNTER (OUTPATIENT)
Dept: OTHER | Age: 83
Discharge: HOME | End: 2018-07-11
Attending: UROLOGY | Admitting: UROLOGY

## 2018-07-11 DIAGNOSIS — N20.0 CALCULUS OF KIDNEY: ICD-10-CM

## 2018-07-26 ENCOUNTER — HOSPITAL ENCOUNTER (OUTPATIENT)
Age: 83
Discharge: HOME | End: 2018-07-26
Payer: MEDICARE

## 2018-07-26 LAB
ALBUMIN SERPL-MCNC: 3.9 G/DL (ref 3.4–5)
ANION GAP SERPL CALCULATED.3IONS-SCNC: 10 MMOL/L (ref 3–16)
BASOPHILS ABSOLUTE: 0 K/UL (ref 0–0.2)
BASOPHILS RELATIVE PERCENT: 0.2 %
BUN BLDV-MCNC: 25 MG/DL (ref 7–20)
CALCIUM SERPL-MCNC: 9.5 MG/DL (ref 8.3–10.6)
CHLORIDE BLD-SCNC: 105 MMOL/L (ref 99–110)
CO2: 24 MMOL/L (ref 21–32)
CREAT SERPL-MCNC: 2.4 MG/DL (ref 0.8–1.3)
EOSINOPHILS ABSOLUTE: 0.3 K/UL (ref 0–0.6)
EOSINOPHILS RELATIVE PERCENT: 2.1 %
GFR AFRICAN AMERICAN: 32
GFR NON-AFRICAN AMERICAN: 26
GLUCOSE BLD-MCNC: 230 MG/DL (ref 70–99)
HCT VFR BLD CALC: 47.5 % (ref 40.5–52.5)
HEMATOLOGY PATH CONSULT: NO
HEMOGLOBIN: 15.4 G/DL (ref 13.5–17.5)
LYMPHOCYTES ABSOLUTE: 10.7 K/UL (ref 1–5.1)
LYMPHOCYTES RELATIVE PERCENT: 70.8 %
MCH RBC QN AUTO: 28.8 PG (ref 26–34)
MCHC RBC AUTO-ENTMCNC: 32.4 G/DL (ref 31–36)
MCV RBC AUTO: 89 FL (ref 80–100)
MONOCYTES ABSOLUTE: 0.7 K/UL (ref 0–1.3)
MONOCYTES RELATIVE PERCENT: 4.3 %
NEUTROPHILS ABSOLUTE: 3.4 K/UL (ref 1.7–7.7)
NEUTROPHILS RELATIVE PERCENT: 22.6 %
PDW BLD-RTO: 16.6 % (ref 12.4–15.4)
PHOSPHORUS: 3.6 MG/DL (ref 2.5–4.9)
PLATELET # BLD: 137 K/UL (ref 135–450)
PMV BLD AUTO: 8.5 FL (ref 5–10.5)
POTASSIUM SERPL-SCNC: 4.6 MMOL/L (ref 3.5–5.1)
RBC # BLD: 5.34 M/UL (ref 4.2–5.9)
SODIUM BLD-SCNC: 139 MMOL/L (ref 136–145)
VITAMIN D 25-HYDROXY: 63.2 NG/ML
WBC # BLD: 15.2 K/UL (ref 4–11)

## 2018-07-26 PROCEDURE — 85025 COMPLETE CBC W/AUTO DIFF WBC: CPT

## 2018-07-26 PROCEDURE — 80069 RENAL FUNCTION PANEL: CPT

## 2018-07-26 PROCEDURE — 82306 VITAMIN D 25 HYDROXY: CPT

## 2018-07-26 PROCEDURE — 36415 COLL VENOUS BLD VENIPUNCTURE: CPT

## 2018-08-05 DIAGNOSIS — E11.65 TYPE 2 DIABETES MELLITUS WITH HYPERGLYCEMIA (HCC): ICD-10-CM

## 2018-08-05 RX ORDER — CALCIUM CITRATE/VITAMIN D3 200MG-6.25
TABLET ORAL
Qty: 100 STRIP | Refills: 0 | Status: SHIPPED | OUTPATIENT
Start: 2018-08-05 | End: 2018-11-07 | Stop reason: SDUPTHER

## 2018-09-28 RX ORDER — PROPRANOLOL HYDROCHLORIDE 20 MG/1
TABLET ORAL
Qty: 90 TABLET | Refills: 0 | Status: SHIPPED | OUTPATIENT
Start: 2018-09-28 | End: 2018-12-28 | Stop reason: SDUPTHER

## 2018-11-07 DIAGNOSIS — E11.65 TYPE 2 DIABETES MELLITUS WITH HYPERGLYCEMIA (HCC): ICD-10-CM

## 2018-11-07 RX ORDER — CALCIUM CITRATE/VITAMIN D3 200MG-6.25
TABLET ORAL
Qty: 100 STRIP | Refills: 0 | Status: SHIPPED | OUTPATIENT
Start: 2018-11-07 | End: 2019-01-24 | Stop reason: SDUPTHER

## 2018-11-15 RX ORDER — GLIPIZIDE 10 MG/1
TABLET ORAL
Qty: 30 TABLET | Refills: 3 | Status: SHIPPED | OUTPATIENT
Start: 2018-11-15 | End: 2019-01-24 | Stop reason: SDUPTHER

## 2018-12-10 ENCOUNTER — TELEPHONE (OUTPATIENT)
Dept: PULMONOLOGY | Age: 83
End: 2018-12-10

## 2018-12-11 ENCOUNTER — OFFICE VISIT (OUTPATIENT)
Dept: DERMATOLOGY | Age: 83
End: 2018-12-11
Payer: MEDICARE

## 2018-12-11 DIAGNOSIS — Z85.828 HISTORY OF BASAL CELL CANCER: ICD-10-CM

## 2018-12-11 DIAGNOSIS — D22.9 BENIGN NEVUS: ICD-10-CM

## 2018-12-11 DIAGNOSIS — L57.0 AK (ACTINIC KERATOSIS): Primary | ICD-10-CM

## 2018-12-11 PROCEDURE — G8427 DOCREV CUR MEDS BY ELIG CLIN: HCPCS | Performed by: DERMATOLOGY

## 2018-12-11 PROCEDURE — G8484 FLU IMMUNIZE NO ADMIN: HCPCS | Performed by: DERMATOLOGY

## 2018-12-11 PROCEDURE — 1101F PT FALLS ASSESS-DOCD LE1/YR: CPT | Performed by: DERMATOLOGY

## 2018-12-11 PROCEDURE — G8419 CALC BMI OUT NRM PARAM NOF/U: HCPCS | Performed by: DERMATOLOGY

## 2018-12-11 PROCEDURE — 17004 DESTROY PREMAL LESIONS 15/>: CPT | Performed by: DERMATOLOGY

## 2018-12-11 PROCEDURE — 1036F TOBACCO NON-USER: CPT | Performed by: DERMATOLOGY

## 2018-12-11 PROCEDURE — 99213 OFFICE O/P EST LOW 20 MIN: CPT | Performed by: DERMATOLOGY

## 2018-12-11 PROCEDURE — 4040F PNEUMOC VAC/ADMIN/RCVD: CPT | Performed by: DERMATOLOGY

## 2018-12-11 PROCEDURE — 1123F ACP DISCUSS/DSCN MKR DOCD: CPT | Performed by: DERMATOLOGY

## 2018-12-11 NOTE — PROGRESS NOTES
the trunk and extremities are multiple well-defined round and oval symmetric smoothly-bordered uniformly brown macules and papules. Scattered gritty erythematous papules scalp, face, dorsal forearms. Well-healed scar at site of prior basal cell carcinoma no sign of recurrence      Assessment and Plan     1. AK (actinic keratosis) - 16-scalp, face, dorsal forearm lesion(s) treated w/ liquid nitrogen. Edu re: risk of blister formation, discomfort, scar, hypopigmentation. Discussed wound care. 2. Benign nevus -Benign acquired melanocytic nevi  -Recommend monthly self skin exams   -Educated regarding the ABCDEs of melanoma detection   -Call for any new/changing moles or concerning lesions  -Reviewed sun protective behavior -- sun avoidance during the peak hours of the day, sun-protective clothing (including hat and sunglasses), sunscreen use (water resistant, broad spectrum, SPF at least 30, need for reapplication every 2 to 3 hours), avoidance of tanning beds        3. History of basal cell cancer - No evidence of recurrence. Discussed risk of subsequent skin cancers and increased risk of melanoma. Reviewed importance of monitoring skin for change and sun protection with hats and sunscreen, as well as sun avoidance.

## 2018-12-27 ENCOUNTER — OFFICE VISIT (OUTPATIENT)
Dept: INTERNAL MEDICINE CLINIC | Age: 83
End: 2018-12-27
Payer: MEDICARE

## 2018-12-27 VITALS
HEART RATE: 78 BPM | OXYGEN SATURATION: 96 % | DIASTOLIC BLOOD PRESSURE: 78 MMHG | BODY MASS INDEX: 25.82 KG/M2 | WEIGHT: 160 LBS | SYSTOLIC BLOOD PRESSURE: 120 MMHG

## 2018-12-27 DIAGNOSIS — I10 ESSENTIAL HYPERTENSION: ICD-10-CM

## 2018-12-27 DIAGNOSIS — E11.65 TYPE 2 DIABETES MELLITUS WITH HYPERGLYCEMIA, UNSPECIFIED WHETHER LONG TERM INSULIN USE (HCC): Primary | ICD-10-CM

## 2018-12-27 DIAGNOSIS — E78.2 MIXED HYPERLIPIDEMIA: ICD-10-CM

## 2018-12-27 DIAGNOSIS — N28.9 RENAL INSUFFICIENCY: ICD-10-CM

## 2018-12-27 PROCEDURE — 1123F ACP DISCUSS/DSCN MKR DOCD: CPT | Performed by: INTERNAL MEDICINE

## 2018-12-27 PROCEDURE — G8419 CALC BMI OUT NRM PARAM NOF/U: HCPCS | Performed by: INTERNAL MEDICINE

## 2018-12-27 PROCEDURE — 1036F TOBACCO NON-USER: CPT | Performed by: INTERNAL MEDICINE

## 2018-12-27 PROCEDURE — 4040F PNEUMOC VAC/ADMIN/RCVD: CPT | Performed by: INTERNAL MEDICINE

## 2018-12-27 PROCEDURE — G8427 DOCREV CUR MEDS BY ELIG CLIN: HCPCS | Performed by: INTERNAL MEDICINE

## 2018-12-27 PROCEDURE — 99214 OFFICE O/P EST MOD 30 MIN: CPT | Performed by: INTERNAL MEDICINE

## 2018-12-27 PROCEDURE — G8484 FLU IMMUNIZE NO ADMIN: HCPCS | Performed by: INTERNAL MEDICINE

## 2018-12-27 PROCEDURE — 1101F PT FALLS ASSESS-DOCD LE1/YR: CPT | Performed by: INTERNAL MEDICINE

## 2018-12-27 PROCEDURE — G8510 SCR DEP NEG, NO PLAN REQD: HCPCS | Performed by: INTERNAL MEDICINE

## 2018-12-27 ASSESSMENT — PATIENT HEALTH QUESTIONNAIRE - PHQ9
1. LITTLE INTEREST OR PLEASURE IN DOING THINGS: 0
SUM OF ALL RESPONSES TO PHQ QUESTIONS 1-9: 0
SUM OF ALL RESPONSES TO PHQ9 QUESTIONS 1 & 2: 0
SUM OF ALL RESPONSES TO PHQ QUESTIONS 1-9: 0
2. FEELING DOWN, DEPRESSED OR HOPELESS: 0

## 2018-12-28 RX ORDER — PROPRANOLOL HYDROCHLORIDE 20 MG/1
TABLET ORAL
Qty: 90 TABLET | Refills: 0 | Status: SHIPPED | OUTPATIENT
Start: 2018-12-28 | End: 2019-03-28 | Stop reason: SDUPTHER

## 2018-12-28 NOTE — TELEPHONE ENCOUNTER
Refill request for     -                 Propranolol 20 MG                 medication.      Name of [address] [place] [address]  # [phone]    Last visit - 12/27/18     Pending visit - 06/27/19    Last refill -  09/28/18

## 2019-01-07 ENCOUNTER — HOSPITAL ENCOUNTER (OUTPATIENT)
Age: 84
Discharge: HOME | End: 2019-01-07
Payer: MEDICARE

## 2019-01-07 DIAGNOSIS — I10 ESSENTIAL HYPERTENSION: ICD-10-CM

## 2019-01-07 DIAGNOSIS — N28.9 RENAL INSUFFICIENCY: ICD-10-CM

## 2019-01-07 DIAGNOSIS — E11.65 TYPE 2 DIABETES MELLITUS WITH HYPERGLYCEMIA, UNSPECIFIED WHETHER LONG TERM INSULIN USE (HCC): ICD-10-CM

## 2019-01-07 DIAGNOSIS — E78.2 MIXED HYPERLIPIDEMIA: ICD-10-CM

## 2019-01-07 LAB
A/G RATIO: 1.8 (ref 1.1–2.2)
ALBUMIN SERPL-MCNC: 4.2 G/DL (ref 3.4–5)
ALP BLD-CCNC: 46 U/L (ref 40–129)
ALT SERPL-CCNC: 17 U/L (ref 10–40)
ANION GAP SERPL CALCULATED.3IONS-SCNC: 16 MMOL/L (ref 3–16)
AST SERPL-CCNC: 19 U/L (ref 15–37)
BILIRUB SERPL-MCNC: 0.8 MG/DL (ref 0–1)
BUN BLDV-MCNC: 40 MG/DL (ref 7–20)
CALCIUM SERPL-MCNC: 9.9 MG/DL (ref 8.3–10.6)
CHLORIDE BLD-SCNC: 103 MMOL/L (ref 99–110)
CHOLESTEROL, TOTAL: 107 MG/DL (ref 0–199)
CO2: 23 MMOL/L (ref 21–32)
CREAT SERPL-MCNC: 2.5 MG/DL (ref 0.8–1.3)
GFR AFRICAN AMERICAN: 30
GFR NON-AFRICAN AMERICAN: 25
GLOBULIN: 2.3 G/DL
GLUCOSE BLD-MCNC: 147 MG/DL (ref 70–99)
HDLC SERPL-MCNC: 28 MG/DL (ref 40–60)
LDL CHOLESTEROL CALCULATED: 25 MG/DL
POTASSIUM SERPL-SCNC: 4.4 MMOL/L (ref 3.5–5.1)
SODIUM BLD-SCNC: 142 MMOL/L (ref 136–145)
TOTAL PROTEIN: 6.5 G/DL (ref 6.4–8.2)
TRIGL SERPL-MCNC: 268 MG/DL (ref 0–150)
VLDLC SERPL CALC-MCNC: 54 MG/DL

## 2019-01-07 PROCEDURE — 80061 LIPID PANEL: CPT

## 2019-01-07 PROCEDURE — 83036 HEMOGLOBIN GLYCOSYLATED A1C: CPT

## 2019-01-07 PROCEDURE — 36415 COLL VENOUS BLD VENIPUNCTURE: CPT

## 2019-01-07 PROCEDURE — 80053 COMPREHEN METABOLIC PANEL: CPT

## 2019-01-08 LAB
ESTIMATED AVERAGE GLUCOSE: 211.6 MG/DL
HBA1C MFR BLD: 9 %

## 2019-01-09 DIAGNOSIS — N28.9 RENAL INSUFFICIENCY: Primary | ICD-10-CM

## 2019-01-10 ENCOUNTER — HOSPITAL ENCOUNTER (OUTPATIENT)
Age: 84
Discharge: HOME | End: 2019-01-10
Payer: MEDICARE

## 2019-01-10 LAB
ALBUMIN SERPL-MCNC: 4.2 G/DL (ref 3.4–5)
ANION GAP SERPL CALCULATED.3IONS-SCNC: 12 MMOL/L (ref 3–16)
ANISOCYTOSIS: ABNORMAL
ATYPICAL LYMPHOCYTE RELATIVE PERCENT: 1 % (ref 0–6)
BASOPHILS ABSOLUTE: 0 K/UL (ref 0–0.2)
BASOPHILS RELATIVE PERCENT: 0 %
BILIRUBIN URINE: NEGATIVE
BLOOD, URINE: ABNORMAL
BUN BLDV-MCNC: 44 MG/DL (ref 7–20)
CALCIUM SERPL-MCNC: 10.2 MG/DL (ref 8.3–10.6)
CHLORIDE BLD-SCNC: 104 MMOL/L (ref 99–110)
CLARITY: CLEAR
CO2: 25 MMOL/L (ref 21–32)
COLOR: YELLOW
CREAT SERPL-MCNC: 2.5 MG/DL (ref 0.8–1.3)
CREATININE URINE: 106.7 MG/DL (ref 39–259)
EOSINOPHILS ABSOLUTE: 0.5 K/UL (ref 0–0.6)
EOSINOPHILS RELATIVE PERCENT: 3 %
EPITHELIAL CELLS, UA: ABNORMAL /HPF
GFR AFRICAN AMERICAN: 30
GFR NON-AFRICAN AMERICAN: 25
GLUCOSE BLD-MCNC: 253 MG/DL (ref 70–99)
GLUCOSE URINE: 500 MG/DL
HCT VFR BLD CALC: 45.7 % (ref 40.5–52.5)
HEMOGLOBIN: 14.9 G/DL (ref 13.5–17.5)
KETONES, URINE: NEGATIVE MG/DL
LEUKOCYTE ESTERASE, URINE: NEGATIVE
LYMPHOCYTES ABSOLUTE: 12.8 K/UL (ref 1–5.1)
LYMPHOCYTES RELATIVE PERCENT: 78 %
MCH RBC QN AUTO: 29.2 PG (ref 26–34)
MCHC RBC AUTO-ENTMCNC: 32.6 G/DL (ref 31–36)
MCV RBC AUTO: 89.4 FL (ref 80–100)
MICROSCOPIC EXAMINATION: YES
MONOCYTES ABSOLUTE: 0.6 K/UL (ref 0–1.3)
MONOCYTES RELATIVE PERCENT: 4 %
NEUTROPHILS ABSOLUTE: 2.3 K/UL (ref 1.7–7.7)
NEUTROPHILS RELATIVE PERCENT: 14 %
NITRITE, URINE: NEGATIVE
PDW BLD-RTO: 16.1 % (ref 12.4–15.4)
PH UA: 5.5
PHOSPHORUS: 4.3 MG/DL (ref 2.5–4.9)
PLATELET # BLD: 105 K/UL (ref 135–450)
PLATELET SLIDE REVIEW: ABNORMAL
PMV BLD AUTO: 8.7 FL (ref 5–10.5)
POTASSIUM SERPL-SCNC: 4.9 MMOL/L (ref 3.5–5.1)
PROTEIN PROTEIN: 53 MG/DL
PROTEIN UA: 30 MG/DL
PROTEIN/CREAT RATIO: 0.5 MG/DL
RBC # BLD: 5.11 M/UL (ref 4.2–5.9)
RBC UA: ABNORMAL /HPF (ref 0–2)
SLIDE REVIEW: ABNORMAL
SMUDGE CELLS: PRESENT
SODIUM BLD-SCNC: 141 MMOL/L (ref 136–145)
SPECIFIC GRAVITY UA: >=1.03
UROBILINOGEN, URINE: 0.2 E.U./DL
WBC # BLD: 16.2 K/UL (ref 4–11)
WBC UA: ABNORMAL /HPF (ref 0–5)

## 2019-01-10 PROCEDURE — 85025 COMPLETE CBC W/AUTO DIFF WBC: CPT

## 2019-01-10 PROCEDURE — 83970 ASSAY OF PARATHORMONE: CPT

## 2019-01-10 PROCEDURE — 84156 ASSAY OF PROTEIN URINE: CPT

## 2019-01-10 PROCEDURE — 80069 RENAL FUNCTION PANEL: CPT

## 2019-01-10 PROCEDURE — 36415 COLL VENOUS BLD VENIPUNCTURE: CPT

## 2019-01-10 PROCEDURE — 82570 ASSAY OF URINE CREATININE: CPT

## 2019-01-10 PROCEDURE — 81001 URINALYSIS AUTO W/SCOPE: CPT

## 2019-01-11 LAB — PARATHYROID HORMONE INTACT: 18.9 PG/ML (ref 14–72)

## 2019-01-15 ENCOUNTER — OFFICE VISIT (OUTPATIENT)
Dept: INTERNAL MEDICINE CLINIC | Age: 84
End: 2019-01-15
Payer: MEDICARE

## 2019-01-15 VITALS
DIASTOLIC BLOOD PRESSURE: 66 MMHG | BODY MASS INDEX: 25.27 KG/M2 | WEIGHT: 161 LBS | SYSTOLIC BLOOD PRESSURE: 124 MMHG | HEIGHT: 67 IN

## 2019-01-15 DIAGNOSIS — I10 ESSENTIAL HYPERTENSION: ICD-10-CM

## 2019-01-15 DIAGNOSIS — E11.65 TYPE 2 DIABETES MELLITUS WITH HYPERGLYCEMIA, UNSPECIFIED WHETHER LONG TERM INSULIN USE (HCC): Primary | ICD-10-CM

## 2019-01-15 LAB
CREATININE URINE POCT: NORMAL
MICROALBUMIN/CREAT 24H UR: NORMAL MG/G{CREAT}
MICROALBUMIN/CREAT UR-RTO: NORMAL

## 2019-01-15 PROCEDURE — 1101F PT FALLS ASSESS-DOCD LE1/YR: CPT | Performed by: NURSE PRACTITIONER

## 2019-01-15 PROCEDURE — G8484 FLU IMMUNIZE NO ADMIN: HCPCS | Performed by: NURSE PRACTITIONER

## 2019-01-15 PROCEDURE — G8419 CALC BMI OUT NRM PARAM NOF/U: HCPCS | Performed by: NURSE PRACTITIONER

## 2019-01-15 PROCEDURE — 99214 OFFICE O/P EST MOD 30 MIN: CPT | Performed by: NURSE PRACTITIONER

## 2019-01-15 PROCEDURE — 4040F PNEUMOC VAC/ADMIN/RCVD: CPT | Performed by: NURSE PRACTITIONER

## 2019-01-15 PROCEDURE — 1036F TOBACCO NON-USER: CPT | Performed by: NURSE PRACTITIONER

## 2019-01-15 PROCEDURE — G8427 DOCREV CUR MEDS BY ELIG CLIN: HCPCS | Performed by: NURSE PRACTITIONER

## 2019-01-15 PROCEDURE — 1123F ACP DISCUSS/DSCN MKR DOCD: CPT | Performed by: NURSE PRACTITIONER

## 2019-01-15 PROCEDURE — 82044 UR ALBUMIN SEMIQUANTITATIVE: CPT | Performed by: NURSE PRACTITIONER

## 2019-01-15 ASSESSMENT — ENCOUNTER SYMPTOMS
NAUSEA: 0
EYE DISCHARGE: 0
ABDOMINAL PAIN: 0
CONSTIPATION: 0
WHEEZING: 0
SHORTNESS OF BREATH: 0
COUGH: 0
DIARRHEA: 0
BLOOD IN STOOL: 0
VOMITING: 0

## 2019-01-24 DIAGNOSIS — E11.65 TYPE 2 DIABETES MELLITUS WITH HYPERGLYCEMIA (HCC): ICD-10-CM

## 2019-01-24 RX ORDER — CALCIUM CITRATE/VITAMIN D3 200MG-6.25
TABLET ORAL
Qty: 100 STRIP | Refills: 0 | Status: SHIPPED | OUTPATIENT
Start: 2019-01-24 | End: 2019-11-18 | Stop reason: SDUPTHER

## 2019-01-24 RX ORDER — ROSUVASTATIN CALCIUM 40 MG/1
TABLET, COATED ORAL
Qty: 90 TABLET | Refills: 1 | Status: SHIPPED | OUTPATIENT
Start: 2019-01-24 | End: 2019-07-24 | Stop reason: SDUPTHER

## 2019-02-04 RX ORDER — GLIPIZIDE 10 MG/1
TABLET ORAL
Qty: 30 TABLET | Refills: 2 | Status: SHIPPED | OUTPATIENT
Start: 2019-02-04 | End: 2019-05-14 | Stop reason: SDUPTHER

## 2019-02-15 ENCOUNTER — OFFICE VISIT (OUTPATIENT)
Dept: INTERNAL MEDICINE CLINIC | Age: 84
End: 2019-02-15
Payer: MEDICARE

## 2019-02-15 VITALS
OXYGEN SATURATION: 96 % | HEART RATE: 70 BPM | WEIGHT: 159 LBS | DIASTOLIC BLOOD PRESSURE: 70 MMHG | SYSTOLIC BLOOD PRESSURE: 124 MMHG | BODY MASS INDEX: 25.28 KG/M2

## 2019-02-15 DIAGNOSIS — E11.65 TYPE 2 DIABETES MELLITUS WITH HYPERGLYCEMIA, UNSPECIFIED WHETHER LONG TERM INSULIN USE (HCC): Primary | ICD-10-CM

## 2019-02-15 DIAGNOSIS — I10 ESSENTIAL HYPERTENSION: ICD-10-CM

## 2019-02-15 PROCEDURE — 1101F PT FALLS ASSESS-DOCD LE1/YR: CPT | Performed by: NURSE PRACTITIONER

## 2019-02-15 PROCEDURE — G8427 DOCREV CUR MEDS BY ELIG CLIN: HCPCS | Performed by: NURSE PRACTITIONER

## 2019-02-15 PROCEDURE — G8510 SCR DEP NEG, NO PLAN REQD: HCPCS | Performed by: NURSE PRACTITIONER

## 2019-02-15 PROCEDURE — 4040F PNEUMOC VAC/ADMIN/RCVD: CPT | Performed by: NURSE PRACTITIONER

## 2019-02-15 PROCEDURE — 99214 OFFICE O/P EST MOD 30 MIN: CPT | Performed by: NURSE PRACTITIONER

## 2019-02-15 PROCEDURE — 1036F TOBACCO NON-USER: CPT | Performed by: NURSE PRACTITIONER

## 2019-02-15 PROCEDURE — 1123F ACP DISCUSS/DSCN MKR DOCD: CPT | Performed by: NURSE PRACTITIONER

## 2019-02-15 PROCEDURE — G8484 FLU IMMUNIZE NO ADMIN: HCPCS | Performed by: NURSE PRACTITIONER

## 2019-02-15 PROCEDURE — G8419 CALC BMI OUT NRM PARAM NOF/U: HCPCS | Performed by: NURSE PRACTITIONER

## 2019-02-15 ASSESSMENT — ENCOUNTER SYMPTOMS
CONSTIPATION: 0
NAUSEA: 0
ABDOMINAL PAIN: 0
EYE DISCHARGE: 0
BLOOD IN STOOL: 0
SHORTNESS OF BREATH: 0
COUGH: 0
DIARRHEA: 0
WHEEZING: 0
VOMITING: 0

## 2019-02-15 ASSESSMENT — PATIENT HEALTH QUESTIONNAIRE - PHQ9
2. FEELING DOWN, DEPRESSED OR HOPELESS: 0
SUM OF ALL RESPONSES TO PHQ QUESTIONS 1-9: 0
SUM OF ALL RESPONSES TO PHQ9 QUESTIONS 1 & 2: 0
1. LITTLE INTEREST OR PLEASURE IN DOING THINGS: 0
SUM OF ALL RESPONSES TO PHQ QUESTIONS 1-9: 0

## 2019-03-29 RX ORDER — PROPRANOLOL HYDROCHLORIDE 20 MG/1
TABLET ORAL
Qty: 90 TABLET | Refills: 0 | Status: SHIPPED | OUTPATIENT
Start: 2019-03-29 | End: 2019-06-17 | Stop reason: SDUPTHER

## 2019-04-04 ENCOUNTER — OFFICE VISIT (OUTPATIENT)
Dept: INTERNAL MEDICINE CLINIC | Age: 84
End: 2019-04-04
Payer: MEDICARE

## 2019-04-04 VITALS
WEIGHT: 157 LBS | BODY MASS INDEX: 24.64 KG/M2 | HEIGHT: 67 IN | TEMPERATURE: 98.3 F | DIASTOLIC BLOOD PRESSURE: 64 MMHG | SYSTOLIC BLOOD PRESSURE: 120 MMHG

## 2019-04-04 DIAGNOSIS — I10 ESSENTIAL HYPERTENSION: ICD-10-CM

## 2019-04-04 DIAGNOSIS — Z79.4 TYPE 2 DIABETES MELLITUS WITH HYPERGLYCEMIA, WITH LONG-TERM CURRENT USE OF INSULIN (HCC): Primary | ICD-10-CM

## 2019-04-04 DIAGNOSIS — E11.65 TYPE 2 DIABETES MELLITUS WITH HYPERGLYCEMIA, WITH LONG-TERM CURRENT USE OF INSULIN (HCC): Primary | ICD-10-CM

## 2019-04-04 DIAGNOSIS — J40 BRONCHITIS: ICD-10-CM

## 2019-04-04 LAB — HBA1C MFR BLD: 8.7 %

## 2019-04-04 PROCEDURE — 99214 OFFICE O/P EST MOD 30 MIN: CPT | Performed by: NURSE PRACTITIONER

## 2019-04-04 PROCEDURE — G8420 CALC BMI NORM PARAMETERS: HCPCS | Performed by: NURSE PRACTITIONER

## 2019-04-04 PROCEDURE — G8427 DOCREV CUR MEDS BY ELIG CLIN: HCPCS | Performed by: NURSE PRACTITIONER

## 2019-04-04 PROCEDURE — 83036 HEMOGLOBIN GLYCOSYLATED A1C: CPT | Performed by: NURSE PRACTITIONER

## 2019-04-04 PROCEDURE — 4040F PNEUMOC VAC/ADMIN/RCVD: CPT | Performed by: NURSE PRACTITIONER

## 2019-04-04 PROCEDURE — 1036F TOBACCO NON-USER: CPT | Performed by: NURSE PRACTITIONER

## 2019-04-04 PROCEDURE — 1123F ACP DISCUSS/DSCN MKR DOCD: CPT | Performed by: NURSE PRACTITIONER

## 2019-04-04 RX ORDER — CEFDINIR 300 MG/1
300 CAPSULE ORAL 2 TIMES DAILY
Qty: 14 CAPSULE | Refills: 0 | Status: SHIPPED | OUTPATIENT
Start: 2019-04-04 | End: 2019-04-11

## 2019-04-04 RX ORDER — PREDNISONE 20 MG/1
TABLET ORAL
Qty: 10 TABLET | Refills: 0 | Status: SHIPPED | OUTPATIENT
Start: 2019-04-04 | End: 2019-05-14 | Stop reason: ALTCHOICE

## 2019-04-04 RX ORDER — ALBUTEROL SULFATE 90 UG/1
2 AEROSOL, METERED RESPIRATORY (INHALATION) EVERY 6 HOURS PRN
Qty: 1 INHALER | Refills: 3 | Status: SHIPPED | OUTPATIENT
Start: 2019-04-04 | End: 2019-10-01

## 2019-04-04 ASSESSMENT — ENCOUNTER SYMPTOMS
VOMITING: 0
WHEEZING: 0
DIARRHEA: 0
NAUSEA: 0
BLOOD IN STOOL: 0
EYE DISCHARGE: 0
SHORTNESS OF BREATH: 0
COUGH: 1
CONSTIPATION: 0
ABDOMINAL PAIN: 0

## 2019-04-04 NOTE — PROGRESS NOTES
Age of Onset    Heart Disease Father     Heart Disease Sister        Past Medical History:   Diagnosis Date    Back pain     Cancer ([facility]     skin    Chronic kidney disease     Diabetes mellitus ([facility]     Essential tremor     History of BPH     Hypercholesteremia     Hypertension     Renal insufficiency     Type 2 diabetes mellitus with hyperglycemia ([facility] [address] [facility] 12/21/2017       Review of Systems   Constitutional: Positive for activity change, appetite change and fatigue. Negative for fever. HENT: Negative for congestion. Eyes: Negative for discharge. Respiratory: Positive for cough. Negative for shortness of breath and wheezing. Cardiovascular: Negative for chest pain, palpitations and leg swelling. Gastrointestinal: Negative for abdominal pain, blood in stool, constipation, diarrhea, nausea and vomiting. Genitourinary: Negative for dysuria and hematuria. Musculoskeletal: Negative for myalgias. Skin: Negative for rash. Neurological: Negative for dizziness. Psychiatric/Behavioral: The patient is not nervous/anxious. Physical Exam   Constitutional: He is oriented to person, place, and time. No distress. HENT:   Right Ear: External ear normal.   Left Ear: External ear normal.   Mouth/Throat: Oropharynx is clear and moist. No oropharyngeal exudate. Eyes: Right eye exhibits no discharge. Left eye exhibits no discharge. No scleral icterus. Neck: Normal range of motion. No thyromegaly present. Cardiovascular: Normal rate, regular rhythm, normal heart sounds and intact distal pulses. Exam reveals no gallop and no friction rub. No murmur heard. Pulmonary/Chest: Effort normal. He has wheezes. Abdominal: Soft. Bowel sounds are normal. He exhibits no distension. There is no tenderness. Musculoskeletal: Normal range of motion. He exhibits no edema or tenderness. Lymphadenopathy:     He has no cervical adenopathy.    Neurological: He is alert and oriented to person, place, and

## 2019-05-14 ENCOUNTER — OFFICE VISIT (OUTPATIENT)
Dept: DERMATOLOGY | Age: 84
End: 2019-05-14
Payer: MEDICARE

## 2019-05-14 DIAGNOSIS — R23.8 INFLAMMATORY PAPULE: ICD-10-CM

## 2019-05-14 DIAGNOSIS — D22.9 BENIGN NEVUS: ICD-10-CM

## 2019-05-14 DIAGNOSIS — L57.0 KERATOSIS, ACTINIC: Primary | ICD-10-CM

## 2019-05-14 DIAGNOSIS — Z85.828 HISTORY OF BASAL CELL CANCER: ICD-10-CM

## 2019-05-14 DIAGNOSIS — L82.1 SEBORRHEIC KERATOSES: ICD-10-CM

## 2019-05-14 PROCEDURE — 1123F ACP DISCUSS/DSCN MKR DOCD: CPT | Performed by: DERMATOLOGY

## 2019-05-14 PROCEDURE — G8427 DOCREV CUR MEDS BY ELIG CLIN: HCPCS | Performed by: DERMATOLOGY

## 2019-05-14 PROCEDURE — 17000 DESTRUCT PREMALG LESION: CPT | Performed by: DERMATOLOGY

## 2019-05-14 PROCEDURE — 99214 OFFICE O/P EST MOD 30 MIN: CPT | Performed by: DERMATOLOGY

## 2019-05-14 PROCEDURE — 17003 DESTRUCT PREMALG LES 2-14: CPT | Performed by: DERMATOLOGY

## 2019-05-14 PROCEDURE — 1036F TOBACCO NON-USER: CPT | Performed by: DERMATOLOGY

## 2019-05-14 PROCEDURE — 4040F PNEUMOC VAC/ADMIN/RCVD: CPT | Performed by: DERMATOLOGY

## 2019-05-14 PROCEDURE — G8420 CALC BMI NORM PARAMETERS: HCPCS | Performed by: DERMATOLOGY

## 2019-05-14 RX ORDER — GLIPIZIDE 10 MG/1
TABLET ORAL
Qty: 90 TABLET | Refills: 1 | Status: SHIPPED | OUTPATIENT
Start: 2019-05-14 | End: 2019-10-01

## 2019-05-14 NOTE — PROGRESS NOTES
[facility]) Dermatology  BHARGAVI Tolbert Holiday  1935    81 y.o. male     Date of Visit: 5/14/2019    Chief Complaint:   Chief Complaint   Patient presents with    Skin Lesion     Lesion on Lt cheek, 6 month TBSE. Hx of NMSC,AK's        I was asked to see this patient by Dr. Welch ref. provider found. History of Present Illness:  1. Total body skin exam    New papule left lateral malar cheek-with initially mildly tender. Has improved over the last several weeks. Present for about 4 weeks. Actinic keratoses scalp. Last completed Efudex 2016. Notes a few scaly papules-none that are itching, bleeding, growing    Multiple nevi-stable in size, shape, color. Has not noticed any new or changing pigmented lesions    Wearing a hat regularly. 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 Social History:   Social History     Socioeconomic History    Marital status:       Spouse name: Not on file    Number of children: Not on file    Years of education: Not on file    Highest education level: Not on file   Occupational History    Not on file   Social Needs    Financial resource strain: Not on file    Food insecurity:     Worry: Not on file     Inability: Not on file    Transportation needs:     Medical: Not on file     Non-medical: Not on file   Tobacco Use    Smoking status: Former Smoker     Packs/day: 1.00 Years: 10.00     Pack years: 10.00     Types: Cigarettes     Last attempt to quit: 3/19/1978     Years since quittin.1    Smokeless tobacco: Never Used    Tobacco comment: quit 25 years ago   Substance and Sexual Activity    Alcohol use: Yes     Comment: on occasion    Drug use: No    Sexual activity: Not on file   Lifestyle    Physical activity:     Days per week: Not on file     Minutes per session: Not on file    Stress: Not on file   Relationships    Social connections:     Talks on phone: Not on file     Gets together: Not on file     Attends Religion service: Not on file     Active member of club or organization: Not on file     Attends meetings of clubs or organizations: Not on file     Relationship status: Not on file    Intimate partner violence:     Fear of current or ex partner: Not on file     Emotionally abused: Not on file     Physically abused: Not on file     Forced sexual activity: Not on file   Other Topics Concern    Not on file   Social History Narrative    Not on file       Physical Examination       -General: Well-appearing, NAD  1. Normal affect. Total body skin exam including scalp, face, neck, chest, abdomen, back, bilateral upper extremities, bilateral lower extremities, ocular conjunctiva, external lips, and nails was performed. Examination normal unless stated below. Underwear area not examined. Scattered on the trunk and extremities are multiple well-defined round and oval symmetric smoothly-bordered uniformly brown macules and papules. Scattered hyperkeratotic stuck on papules and plaques over his torso. Gritty erythematous papules scalp. Well-healed scar at site of prior basal cell carcinoma no sign of recurrence  Left lateral malar cheek raised pink papule with intact skin markings-cyst versus resolving inflammatory papule. Assessment and Plan     1. Keratosis, actinic - 6, scalp lesion(s) treated w/ liquid nitrogen.  Edu re: risk of blister formation, discomfort, scar, hypopigmentation. Discussed wound care. Plan for Efudex 5% cream b.i.d. for 2 weeks after his next appointment in the fall    2. Benign nevus - Benign acquired melanocytic nevi  -Recommend monthly self skin exams   -Educated regarding the ABCDEs of melanoma detection   -Call for any new/changing moles or concerning lesions  -Reviewed sun protective behavior -- sun avoidance during the peak hours of the day, sun-protective clothing (including hat and sunglasses), sunscreen use (water resistant, broad spectrum, SPF at least 30, need for reapplication every 2 to 3 hours), avoidance of tanning beds      3. Seborrheic keratoses -monitor for change    4. Inflammatory papule -patient to monitor for resolution. He will let me know if lesion does not fully resolve    5. History of basal cell cancer - No evidence of recurrence. Discussed risk of subsequent skin cancers and increased risk of melanoma. Reviewed importance of monitoring skin for change and sun protection with hats and sunscreen, as well as sun avoidance.

## 2019-06-02 ENCOUNTER — APPOINTMENT (OUTPATIENT)
Dept: GENERAL RADIOLOGY | Age: 84
End: 2019-06-02
Payer: MEDICARE

## 2019-06-02 ENCOUNTER — APPOINTMENT (OUTPATIENT)
Dept: CT IMAGING | Age: 84
End: 2019-06-02
Payer: MEDICARE

## 2019-06-02 ENCOUNTER — HOSPITAL ENCOUNTER (EMERGENCY)
Age: 84
Discharge: HOME | End: 2019-06-02
Attending: EMERGENCY MEDICINE
Payer: MEDICARE

## 2019-06-02 VITALS
TEMPERATURE: 98.3 F | RESPIRATION RATE: 16 BRPM | HEART RATE: 62 BPM | SYSTOLIC BLOOD PRESSURE: 125 MMHG | WEIGHT: 148 LBS | HEIGHT: 62 IN | DIASTOLIC BLOOD PRESSURE: 92 MMHG | OXYGEN SATURATION: 99 % | BODY MASS INDEX: 27.23 KG/M2

## 2019-06-02 DIAGNOSIS — T14.8XXA SKIN ABRASION: ICD-10-CM

## 2019-06-02 DIAGNOSIS — S52.501A CLOSED FRACTURE OF DISTAL END OF RIGHT RADIUS, UNSPECIFIED FRACTURE MORPHOLOGY, INITIAL ENCOUNTER: ICD-10-CM
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
## 2019-06-02 PROCEDURE — 72125 CT NECK SPINE W/O DYE: CPT

## 2019-06-02 PROCEDURE — 70450 CT HEAD/BRAIN W/O DYE: CPT

## 2019-06-02 PROCEDURE — 4500000023 HC ED LEVEL 3 PROCEDURE

## 2019-06-02 PROCEDURE — 73090 X-RAY EXAM OF FOREARM: CPT

## 2019-06-02 PROCEDURE — 73130 X-RAY EXAM OF HAND: CPT

## 2019-06-02 PROCEDURE — 99283 EMERGENCY DEPT VISIT LOW MDM: CPT

## 2019-06-02 PROCEDURE — 73110 X-RAY EXAM OF WRIST: CPT

## 2019-06-02 ASSESSMENT — ENCOUNTER SYMPTOMS: BACK PAIN: 0

## 2019-06-02 NOTE — ED NOTES
Pt instructed to follow up with [facility].  Assessed per Dr Zheng Mean, LPN  31/49/10 6552

## 2019-06-02 NOTE — ED PROVIDER NOTES
[facility]  ED  eMERGENCYdEPARTMENT eNCOUnter      Pt Name: Amarjit Chen  MRN: [id number]  [place] 1935  Date of evaluation: 6/2/2019  Lesly Arreola MD    CHIEF COMPLAINT       Chief Complaint   Patient presents with    Wrist Injury     Pt was hanging a picture and it fell and  broke. the glass cut bilat forearms. Pt was seen for lacerations on arms, but now right wrist is swollen. HISTORY OF PRESENT ILLNESS   (Location/Symptom, Timing/Onset,Context/Setting, Quality, Duration, Modifying Factors, Severity)  Note limiting factors. Amarjit Chen is a 81 y.o. male who presents to the emergency department presents ED for hand and wrist swelling. He was on a step stool trying to put up a picture when he slipped and fell hitting his head on the edge of a coffee table. Event occurred yesterday. Denies LOC or neck pain. He had some skin abrasions to bilateral arms that were wrapped up by a neighbor. Said he had some swelling to the right arm and decided to come and get evaluated. Denies pain to the area and fever, nausea, vomiting still maintaining full range of motion. HPI    Nursing Notes were reviewed. REVIEW OF SYSTEMS    (2-9 systems for level 4, 10 or more for level 5)     Review of Systems   Musculoskeletal: Negative for arthralgias, back pain, neck pain and neck stiffness. Skin: Positive for wound. Neurological: Negative for syncope. Except as noted above the remainder of the review of systems was reviewedand negative.        PAST MEDICAL HISTORY     Past Medical History:   Diagnosis Date    Back pain     Cancer ([address]     skin    Chronic kidney disease     Diabetes mellitus ([address]     Essential tremor     History of BPH     Hypercholesteremia     Hypertension     Renal insufficiency     Type 2 diabetes mellitus with hyperglycemia ([address] 12/21/2017         SURGICAL HISTORY       Past Surgical History:   Procedure Laterality Date    CATARACT REMOVAL      CYSTOSCOPY      HERNIA REPAIR      KYPHOSIS SURGERY  8/24/2012    KYPHOPLASTY T10 AND T12         CURRENT MEDICATIONS       Previous Medications    ACETAMINOPHEN (TYLENOL) 500 MG TABLET    Take 500 mg by mouth every 6 hours as needed for Pain    ALBUTEROL SULFATE HFA (PROVENTIL HFA) 108 (90 BASE) MCG/ACT INHALER    Inhale 2 puffs into the lungs every 6 hours as needed for Wheezing    ASPIRIN 81 MG CHEWABLE TABLET    Take 81 mg by mouth daily. B-D UF III MINI PEN NEEDLES 31G X 5 MM MISC    USE ONCE DAILY    BLOOD GLUCOSE MONITORING SUPPL (TRUE METRIX AIR GLUCOSE METER) ANDRES    E11.65    BLOOD GLUCOSE MONITORING SUPPL (TRUE METRIX AIR GLUCOSE METER) W/DEVICE KIT    Dispense Blood Glucose monitoring kit for diabetes ICD-10-CM: E11.65    CHOLECALCIFEROL (VITAMIN D3) 2000 UNITS CAPS    Take by mouth    FLUOROURACIL (EFUDEX) 5 % CREAM    Apply twice daily to scalp for 2 weeks    GLIPIZIDE (GLUCOTROL) 10 MG TABLET    TAKE ONE TABLET BY MOUTH DAILY    LANCETS MISC    Test one time daily    PROPRANOLOL (INDERAL) 20 MG TABLET    TAKE ONE TABLET BY MOUTH DAILY    RAMIPRIL (ALTACE) 10 MG CAPSULE    TAKE ONE CAPSULE BY MOUTH TWICE A DAY    ROSUVASTATIN (CRESTOR) 40 MG TABLET    TAKE ONE TABLET BY MOUTH DAILY    TOUJEO SOLOSTAR 300 UNIT/ML INJECTION PEN    INJECT 10 UNITS INTO THE SKIN DAILY    TRUE METRIX BLOOD GLUCOSE TEST STRIP    USE TO TEST BLOOD SUGAR TWICE DAILY FOR DIABETES       ALLERGIES     Patient has no known allergies. FAMILY HISTORY       Family History   Problem Relation Age of Onset    Heart Disease Father     Heart Disease Sister           SOCIAL HISTORY       Social History     Socioeconomic History    Marital status:       Spouse name: None    Number of children: None    Years of education: None    Highest education level: None   Occupational History    None   Social Needs    Financial resource strain: None    Food insecurity:     Worry: None     Inability: None    Transportation needs:     Medical: None     Non-medical: None   Tobacco Use    Smoking status: Former Smoker     Packs/day: 1.00     Years: 10.00     Pack years: 10.00     Types: Cigarettes     Last attempt to quit: 3/19/1978     Years since quittin.2    Smokeless tobacco: Never Used    Tobacco comment: quit 25 years ago   Substance and Sexual Activity    Alcohol use: Yes     Comment: on occasion    Drug use: No    Sexual activity: None   Lifestyle    Physical activity:     Days per week: None     Minutes per session: None    Stress: None   Relationships    Social connections:     Talks on phone: None     Gets together: None     Attends Taoist service: None     Active member of club or organization: None     Attends meetings of clubs or organizations: None     Relationship status: None    Intimate partner violence:     Fear of current or ex partner: None     Emotionally abused: None     Physically abused: None     Forced sexual activity: None   Other Topics Concern    None   Social History Narrative    None       SCREENINGS             PHYSICAL EXAM    (up to 7 for level 4, 8 ormore for level 5)     ED Triage Vitals   BP Temp Temp Source Pulse Resp SpO2 Height Weight   19 1547 19 1547 19 1547 19 1547 19 1547 19 1547 19 1548 19 1548   127/85 98.3 °F (36.8 °C) Oral 74 18 96 % 5' 2\" (1.575 m) 148 lb (67.1 kg)       Physical Exam   Constitutional: He is oriented to person, place, and time. Vital signs are normal. He appears well-developed and well-nourished. He is cooperative. Non-toxic appearance. He does not have a sickly appearance. He does not appear ill. No distress. Well-appearing, in comfortably in bed, speaking full sentences, not in acute distress. HENT:   Head: Normocephalic and atraumatic. Eyes: Conjunctivae and EOM are normal. Right eye exhibits no discharge. Left eye exhibits no discharge. Neck: Normal range of motion. Neck supple. Pulmonary/Chest: Effort normal. No respiratory distress. Musculoskeletal: Normal range of motion. Right wrist: He exhibits swelling. He exhibits normal range of motion. Mild swelling to dorsum of right hand to distal forearm with ecchymoses to thenar eminence    Neurological: He is alert and oriented to person, place, and time. Skin: He is not diaphoretic. No pallor. Psychiatric: He has a normal mood and affect. His behavior is normal.   Nursing note and vitals reviewed. DIAGNOSTIC RESULTS     EKG: All EKG's are interpreted by the Emergency Department Physicianwho either signs or Co-signs this chart in the absence of a cardiologist.      RADIOLOGY:   Non-plain film images such as CT, Ultrasound and MRI are read by the radiologist. Plain radiographic images are visualized and preliminarily interpreted by the emergency physician with the below findings:      Interpretation per the Radiologist below, if available at the time of this note:    XR WRIST RIGHT (MIN 3 VIEWS)   Final Result   Transverse sclerotic line of the distal ulna consistent with healing change   of recent fracture. Possible tiny nondisplaced fracture of the ventral cortex of the distal   radius. CT Cervical Spine WO Contrast   Final Result   No acute intracranial abnormality. No acute abnormality of the cervical spine. CT Head WO Contrast   Final Result   No acute intracranial abnormality. No acute abnormality of the cervical spine. XR RADIUS ULNA RIGHT (2 VIEWS)   Final Result   Probable nondisplaced radial and/or ulnar fracture at the wrist joint. Right   wrist radiograph series (at least 3 images) recommended. RECOMMENDATION:   Right wrist series         XR HAND RIGHT (MIN 3 VIEWS)   Final Result   Contour deformity of the distal right radius suspicious for fracture,   possibly chronic. Correlation is recommended.                ED BEDSIDE ULTRASOUND:   Performed by ED Physician - none    LABS:  Labs Reviewed - No data to display    All other labs were within normal range ornot returned as of this dictation. EMERGENCY DEPARTMENT COURSE and DIFFERENTIAL DIAGNOSIS/MDM:   Vitals:    Vitals:    06/02/19 1547 06/02/19 1548   BP: 127/85    Pulse: 74    Resp: 18    Temp: 98.3 °F (36.8 °C)    TempSrc: Oral    SpO2: 96%    Weight:  148 lb (67.1 kg)   Height:  5' 2\" (1.575 m)         [facility]    ED COURSE/MDM    -Rodri Greenfield is a 81 y.o. male is ED status post fall yesterday.  -On exam patient with skin abrasion to forehead, chest, bilateral arms  -swelling and ecchymoses to hand and distal forearm. Full range of motion, 2+ radial pulse, brisk cap refill, without complaints of pain  -Xray right hand/forearm: Contour deformity of the distal right radius suspicious for fracture, possibly chronic. Correlation is recommended. Probable nondisplaced radial and/or ulnar fracture at the wrist joint. Right wrist radiograph series recommended  -Xray of right wrist: Transverse sclerotic line of the distal ulna consistent with healing change of recent fracture. Possible tiny nondisplaced fracture of the ventral cortex of the distal radius. -CT head/Cspine: No acute intracranial abnormality. No acute abnormality of the cervical spine  -wound care of abrasions  -volar splint applied  -Discussed results with patient and they'll x-ray read was possible tiny nondisplaced fracture, we will put patient in a splint and have patient follow up with primary care or orthopedic surgery for repeat x-ray. Strict ED return precautions given for new/worsending symptoms. Patient and family in agreement withplan, verbally confirm understanding and have no further questions/concerns.        REASSESSMENT      Well appearing, non toxic, alert, oriented speaking in full sentences and hemodynamically stable upon discharge      CRITICAL CARE TIME   Total Critical Care time was 0 minutes, excluding separately reportableprocedures. There was a high probability of clinicallysignificant/life threatening deterioration in the patient's condition which required my urgent intervention. CONSULTS:  None    PROCEDURES:  Unless otherwise noted below, none     Procedures    FINAL IMPRESSION      1. Fall, initial encounter    2. Skin abrasion    3.  Closed fracture of distal end of right radius, unspecified fracture morphology, initial encounter          DISPOSITION/PLAN   DISPOSITION        PATIENT REFERREDTO:  Kat Lowry DO  [facility] [address] [facility] [address] [phone]    Call in 1 day        DISCHARGE MEDICATIONS:  New Prescriptions    No medications on file          (Please note that portions of this note were completed with a voice recognition program.  Efforts were made to edit the dictations but occasionally wordsare mis-transcribed.)    Eddie Alaniz MD (electronically signed)  Attending Emergency Physician           Eddie Alaniz MD  06/02/19 [id number]

## 2019-06-02 NOTE — ED NOTES
Pt bilateral forearm skin tears applied with PSO/Mepitel dressing/gauze/kerlex wrap.      Bridget Ruano LPN  86/98/05 1498

## 2019-06-02 NOTE — ED NOTES
Pt placed in 4in Ortho glass Volar splint to right arm/Large sling applied for comfort.      Barrett Limb, LPN  61/36/46 [id number]

## 2019-06-02 NOTE — ED NOTES
Pt bilateral forearm/forehead wounds wraps removed/skin abrasions cleansed with Hibiclens/Normal Saline.      Rasheeda Rodriguez LPN  57/65/17 [id number]

## 2019-06-11 ENCOUNTER — OFFICE VISIT (OUTPATIENT)
Dept: ORTHOPEDIC SURGERY | Age: 84
End: 2019-06-11
Payer: MEDICARE

## 2019-06-11 VITALS — RESPIRATION RATE: 10 BRPM | WEIGHT: 147.93 LBS | BODY MASS INDEX: 27.22 KG/M2 | HEIGHT: 62 IN

## 2019-06-11 DIAGNOSIS — S50.811A ABRASION OF RIGHT FOREARM, INITIAL ENCOUNTER: Primary | ICD-10-CM

## 2019-06-11 DIAGNOSIS — M25.531 RIGHT WRIST PAIN: ICD-10-CM

## 2019-06-11 DIAGNOSIS — S50.812A ABRASION OF LEFT FOREARM, INITIAL ENCOUNTER: ICD-10-CM

## 2019-06-11 PROCEDURE — 99203 OFFICE O/P NEW LOW 30 MIN: CPT | Performed by: ORTHOPAEDIC SURGERY

## 2019-06-11 PROCEDURE — G8427 DOCREV CUR MEDS BY ELIG CLIN: HCPCS | Performed by: ORTHOPAEDIC SURGERY

## 2019-06-11 PROCEDURE — 1036F TOBACCO NON-USER: CPT | Performed by: ORTHOPAEDIC SURGERY

## 2019-06-11 PROCEDURE — G8419 CALC BMI OUT NRM PARAM NOF/U: HCPCS | Performed by: ORTHOPAEDIC SURGERY

## 2019-06-11 PROCEDURE — 4040F PNEUMOC VAC/ADMIN/RCVD: CPT | Performed by: ORTHOPAEDIC SURGERY

## 2019-06-11 PROCEDURE — 1123F ACP DISCUSS/DSCN MKR DOCD: CPT | Performed by: ORTHOPAEDIC SURGERY

## 2019-06-11 NOTE — PROGRESS NOTES
Chief Complaint    Wrist Pain (ck right wrist - fell on 6/1/2019)      History of Present Illness:  Octavia Larios is a 81 y.o. male presents for evaluation of bilateral forearm injuries. He was helping put up some pictures at his home when the picture fell and the glass broke resulting in some abrasions and lacerations of his forearms. This occurred on 6/4/2019. He went to the emergency room was told he may have a wrist fracture. He says he broke his wrist several years ago but did not complain of any pain per se. He says he has some abrasions which the right is a little larger and a little more uncomfortable but are improving. Medical History:  Patient's medications, allergies, past medical, surgical, social and family histories were reviewed and updated as appropriate. Review of Systems:  Pertinent items are noted in HPI  Review of systems reviewed from Patient History Form dated on 6/11/2019 and available in the patient's chart under the Media tab. Vital Signs:  Resp 10   Ht 5' 2.01\" (1.575 m)   Wt 147 lb 14.9 oz (67.1 kg)   BMI 27.05 kg/m²     General Exam:   Constitutional: Patient is adequately groomed with no evidence of malnutrition  DTRs: Deep tendon reflexes are intact  Mental Status: The patient is oriented to time, place and person. The patient's mood and affect are appropriate. Lymphatic: The lymphatic examination bilaterally reveals all areas to be without enlargement or induration. Vascular: Examination reveals no swelling or calf tenderness. Peripheral pulses are palpable and 2+. Neurological: The patient has good coordination. There is no weakness or sensory deficit. Forearm Examination:    Inspection: He has some abrasions very small lacerations of his right forearm dorsally and on his left forearm on the ulnar side. There is no signs of infection no active bleeding and no other issues concerning.     Palpation: Other than the soreness where the abrasions are, he has no bony pain anywhere in his forearms including his wrist.    Range of Motion: Range of motion to wrist and elbows is full and unrestricted without pain    Strength: Limited by the soreness from the lacerations and abrasions    Special Tests:      Skin: There are no rashes, ulcerations or lesions. Gait: Normal    Reflex normal and symmetric to both upper extremities    Additional Comments:       Additional Examinations:             Radiology:     X-rays 3 views of the right wrist were reviewed and reveal old well-healed distal radius fracture in good position        Assessment : Bilateral forearm abrasions/lacerations healing    Impression:  Encounter Diagnoses   Name Primary?     Right wrist pain     Abrasion of right forearm, initial encounter Yes    Abrasion of left forearm, initial encounter        Office Procedures:  Orders Placed This Encounter   Procedures    XR WRIST RIGHT (MIN 3 VIEWS)     Standing Status:   Future     Number of Occurrences:   1     Standing Expiration Date:   6/11/2020       Treatment Plan: Redress his wounds and give him some extra materials to do that over the next week to 2 weeks at which point his problem should be resolved

## 2019-06-17 RX ORDER — INSULIN GLARGINE 300 U/ML
INJECTION, SOLUTION SUBCUTANEOUS
Qty: 4.5 PEN | Refills: 1 | Status: SHIPPED | OUTPATIENT
Start: 2019-06-17 | End: 2020-02-24

## 2019-06-17 RX ORDER — PROPRANOLOL HYDROCHLORIDE 20 MG/1
TABLET ORAL
Qty: 90 TABLET | Refills: 0 | Status: SHIPPED | OUTPATIENT
Start: 2019-06-17 | End: 2019-09-25 | Stop reason: SDUPTHER

## 2019-06-17 NOTE — TELEPHONE ENCOUNTER
Last OV: 4/4/2019  Future Appointments   Date Time Provider Kacy Clarissa   7/1/2019  9:15 AM MD JAMIA Rosen AND DIOGO BLANDON   7/9/2019  1:30 PM ROXANA Mclean CNP AND [facility]   11/19/2019  9:30 AM Rocio Pan MD And Sarai BLANDON

## 2019-07-01 ENCOUNTER — OFFICE VISIT (OUTPATIENT)
Dept: INTERNAL MEDICINE CLINIC | Age: 84
End: 2019-07-01
Payer: MEDICARE

## 2019-07-01 VITALS
BODY MASS INDEX: 27.98 KG/M2 | SYSTOLIC BLOOD PRESSURE: 108 MMHG | OXYGEN SATURATION: 97 % | WEIGHT: 153 LBS | DIASTOLIC BLOOD PRESSURE: 72 MMHG | HEART RATE: 82 BPM

## 2019-07-01 DIAGNOSIS — E78.2 MIXED HYPERLIPIDEMIA: ICD-10-CM

## 2019-07-01 DIAGNOSIS — I10 ESSENTIAL HYPERTENSION: ICD-10-CM

## 2019-07-01 DIAGNOSIS — E11.65 TYPE 2 DIABETES MELLITUS WITH HYPERGLYCEMIA, UNSPECIFIED WHETHER LONG TERM INSULIN USE (HCC): Primary | ICD-10-CM

## 2019-07-01 DIAGNOSIS — N28.9 RENAL INSUFFICIENCY: ICD-10-CM

## 2019-07-01 DIAGNOSIS — N18.30 CHRONIC KIDNEY DISEASE, STAGE III (MODERATE) (HCC): ICD-10-CM

## 2019-07-01 PROBLEM — D72.820 LYMPHOCYTOSIS: Status: ACTIVE | Noted: 2019-07-01

## 2019-07-01 PROCEDURE — 1036F TOBACCO NON-USER: CPT | Performed by: INTERNAL MEDICINE

## 2019-07-01 PROCEDURE — 99214 OFFICE O/P EST MOD 30 MIN: CPT | Performed by: INTERNAL MEDICINE

## 2019-07-01 PROCEDURE — G8419 CALC BMI OUT NRM PARAM NOF/U: HCPCS | Performed by: INTERNAL MEDICINE

## 2019-07-01 PROCEDURE — G8427 DOCREV CUR MEDS BY ELIG CLIN: HCPCS | Performed by: INTERNAL MEDICINE

## 2019-07-01 PROCEDURE — 4040F PNEUMOC VAC/ADMIN/RCVD: CPT | Performed by: INTERNAL MEDICINE

## 2019-07-01 PROCEDURE — 1123F ACP DISCUSS/DSCN MKR DOCD: CPT | Performed by: INTERNAL MEDICINE

## 2019-07-03 ENCOUNTER — HOSPITAL ENCOUNTER (OUTPATIENT)
Age: 84
Discharge: HOME | End: 2019-07-03
Payer: MEDICARE

## 2019-07-03 DIAGNOSIS — I10 ESSENTIAL HYPERTENSION: ICD-10-CM

## 2019-07-03 DIAGNOSIS — E78.2 MIXED HYPERLIPIDEMIA: ICD-10-CM

## 2019-07-03 DIAGNOSIS — N28.9 RENAL INSUFFICIENCY: ICD-10-CM

## 2019-07-03 LAB
A/G RATIO: 1.8 (ref 1.1–2.2)
ALBUMIN SERPL-MCNC: 4.4 G/DL (ref 3.4–5)
ALP BLD-CCNC: 42 U/L (ref 40–129)
ALT SERPL-CCNC: 22 U/L (ref 10–40)
ANION GAP SERPL CALCULATED.3IONS-SCNC: 15 MMOL/L (ref 3–16)
AST SERPL-CCNC: 26 U/L (ref 15–37)
BILIRUB SERPL-MCNC: 1.2 MG/DL (ref 0–1)
BUN BLDV-MCNC: 37 MG/DL (ref 7–20)
CALCIUM SERPL-MCNC: 10 MG/DL (ref 8.3–10.6)
CHLORIDE BLD-SCNC: 102 MMOL/L (ref 99–110)
CHOLESTEROL, TOTAL: 107 MG/DL (ref 0–199)
CO2: 23 MMOL/L (ref 21–32)
CREAT SERPL-MCNC: 2.5 MG/DL (ref 0.8–1.3)
CREATININE URINE: 77.4 MG/DL (ref 39–259)
GFR AFRICAN AMERICAN: 30
GFR NON-AFRICAN AMERICAN: 25
GLOBULIN: 2.4 G/DL
GLUCOSE BLD-MCNC: 105 MG/DL (ref 70–99)
HDLC SERPL-MCNC: 31 MG/DL (ref 40–60)
LDL CHOLESTEROL CALCULATED: 29 MG/DL
POTASSIUM SERPL-SCNC: 4.7 MMOL/L (ref 3.5–5.1)
PROTEIN PROTEIN: 44 MG/DL
PROTEIN/CREAT RATIO: 0.6 MG/DL
SODIUM BLD-SCNC: 140 MMOL/L (ref 136–145)
TOTAL PROTEIN: 6.8 G/DL (ref 6.4–8.2)
TRIGL SERPL-MCNC: 237 MG/DL (ref 0–150)
VLDLC SERPL CALC-MCNC: 47 MG/DL

## 2019-07-03 PROCEDURE — 82570 ASSAY OF URINE CREATININE: CPT

## 2019-07-03 PROCEDURE — 84156 ASSAY OF PROTEIN URINE: CPT

## 2019-07-03 PROCEDURE — 80053 COMPREHEN METABOLIC PANEL: CPT

## 2019-07-03 PROCEDURE — 36415 COLL VENOUS BLD VENIPUNCTURE: CPT

## 2019-07-03 PROCEDURE — 80061 LIPID PANEL: CPT

## 2019-07-09 ENCOUNTER — HOSPITAL ENCOUNTER (OUTPATIENT)
Age: 84
Discharge: HOME | End: 2019-07-09
Payer: MEDICARE

## 2019-07-09 ENCOUNTER — OFFICE VISIT (OUTPATIENT)
Dept: INTERNAL MEDICINE CLINIC | Age: 84
End: 2019-07-09
Payer: MEDICARE

## 2019-07-09 ENCOUNTER — HOSPITAL ENCOUNTER (OUTPATIENT)
Dept: GENERAL RADIOLOGY | Age: 84
Discharge: HOME | End: 2019-07-09
Payer: MEDICARE

## 2019-07-09 VITALS
DIASTOLIC BLOOD PRESSURE: 72 MMHG | BODY MASS INDEX: 24.33 KG/M2 | OXYGEN SATURATION: 95 % | WEIGHT: 155 LBS | HEIGHT: 67 IN | HEART RATE: 86 BPM | SYSTOLIC BLOOD PRESSURE: 110 MMHG

## 2019-07-09 DIAGNOSIS — N18.9 CHRONIC KIDNEY DISEASE, UNSPECIFIED CKD STAGE: ICD-10-CM

## 2019-07-09 DIAGNOSIS — I10 ESSENTIAL HYPERTENSION: ICD-10-CM

## 2019-07-09 DIAGNOSIS — N20.0 CALCULUS OF KIDNEY: ICD-10-CM

## 2019-07-09 DIAGNOSIS — R31.0 GROSS HEMATURIA: ICD-10-CM

## 2019-07-09 DIAGNOSIS — R31.1 BENIGN ESSENTIAL MICROSCOPIC HEMATURIA: ICD-10-CM

## 2019-07-09 DIAGNOSIS — N18.30 CHRONIC KIDNEY DISEASE, STAGE III (MODERATE) (HCC): ICD-10-CM

## 2019-07-09 DIAGNOSIS — E11.65 TYPE 2 DIABETES MELLITUS WITH HYPERGLYCEMIA, UNSPECIFIED WHETHER LONG TERM INSULIN USE (HCC): Primary | ICD-10-CM

## 2019-07-09 LAB
ALBUMIN SERPL-MCNC: 4.4 G/DL (ref 3.4–5)
ANION GAP SERPL CALCULATED.3IONS-SCNC: 13 MMOL/L (ref 3–16)
ANISOCYTOSIS: ABNORMAL
BASOPHILS ABSOLUTE: 0 K/UL (ref 0–0.2)
BASOPHILS RELATIVE PERCENT: 0 %
BUN BLDV-MCNC: 25 MG/DL (ref 7–20)
CALCIUM SERPL-MCNC: 10 MG/DL (ref 8.3–10.6)
CHLORIDE BLD-SCNC: 104 MMOL/L (ref 99–110)
CO2: 24 MMOL/L (ref 21–32)
CREAT SERPL-MCNC: 2.1 MG/DL (ref 0.8–1.3)
CREATININE URINE: 77.9 MG/DL (ref 39–259)
EOSINOPHILS ABSOLUTE: 0.2 K/UL (ref 0–0.6)
EOSINOPHILS RELATIVE PERCENT: 1 %
GFR AFRICAN AMERICAN: 37
GFR NON-AFRICAN AMERICAN: 30
GLUCOSE BLD-MCNC: 217 MG/DL (ref 70–99)
HBA1C MFR BLD: 8 %
HCT VFR BLD CALC: 45.7 % (ref 40.5–52.5)
HEMATOLOGY PATH CONSULT: NO
HEMOGLOBIN: 15 G/DL (ref 13.5–17.5)
LYMPHOCYTES ABSOLUTE: 10.9 K/UL (ref 1–5.1)
LYMPHOCYTES RELATIVE PERCENT: 67 %
MACROCYTES: ABNORMAL
MCH RBC QN AUTO: 30.1 PG (ref 26–34)
MCHC RBC AUTO-ENTMCNC: 32.9 G/DL (ref 31–36)
MCV RBC AUTO: 91.7 FL (ref 80–100)
MICROCYTES: ABNORMAL
MONOCYTES ABSOLUTE: 0.8 K/UL (ref 0–1.3)
MONOCYTES RELATIVE PERCENT: 5 %
NEUTROPHILS ABSOLUTE: 4.4 K/UL (ref 1.7–7.7)
NEUTROPHILS RELATIVE PERCENT: 27 %
PARATHYROID HORMONE INTACT: 35.1 PG/ML (ref 14–72)
PDW BLD-RTO: 15.9 % (ref 12.4–15.4)
PHOSPHORUS: 2.9 MG/DL (ref 2.5–4.9)
PLATELET # BLD: 117 K/UL (ref 135–450)
PMV BLD AUTO: 8.9 FL (ref 5–10.5)
POTASSIUM SERPL-SCNC: 5.6 MMOL/L (ref 3.5–5.1)
PROTEIN PROTEIN: 47 MG/DL
PROTEIN/CREAT RATIO: 0.6 MG/DL
RBC # BLD: 4.98 M/UL (ref 4.2–5.9)
SLIDE REVIEW: ABNORMAL
SODIUM BLD-SCNC: 141 MMOL/L (ref 136–145)
VITAMIN D 25-HYDROXY: 65.9 NG/ML
WBC # BLD: 16.3 K/UL (ref 4–11)

## 2019-07-09 PROCEDURE — 82306 VITAMIN D 25 HYDROXY: CPT

## 2019-07-09 PROCEDURE — 4040F PNEUMOC VAC/ADMIN/RCVD: CPT | Performed by: NURSE PRACTITIONER

## 2019-07-09 PROCEDURE — 1123F ACP DISCUSS/DSCN MKR DOCD: CPT | Performed by: NURSE PRACTITIONER

## 2019-07-09 PROCEDURE — 1036F TOBACCO NON-USER: CPT | Performed by: NURSE PRACTITIONER

## 2019-07-09 PROCEDURE — 83970 ASSAY OF PARATHORMONE: CPT

## 2019-07-09 PROCEDURE — G8420 CALC BMI NORM PARAMETERS: HCPCS | Performed by: NURSE PRACTITIONER

## 2019-07-09 PROCEDURE — 83036 HEMOGLOBIN GLYCOSYLATED A1C: CPT | Performed by: NURSE PRACTITIONER

## 2019-07-09 PROCEDURE — 85025 COMPLETE CBC W/AUTO DIFF WBC: CPT

## 2019-07-09 PROCEDURE — 99214 OFFICE O/P EST MOD 30 MIN: CPT | Performed by: NURSE PRACTITIONER

## 2019-07-09 PROCEDURE — 80069 RENAL FUNCTION PANEL: CPT

## 2019-07-09 PROCEDURE — 84156 ASSAY OF PROTEIN URINE: CPT

## 2019-07-09 PROCEDURE — 36415 COLL VENOUS BLD VENIPUNCTURE: CPT

## 2019-07-09 PROCEDURE — G8427 DOCREV CUR MEDS BY ELIG CLIN: HCPCS | Performed by: NURSE PRACTITIONER

## 2019-07-09 PROCEDURE — 74018 RADEX ABDOMEN 1 VIEW: CPT

## 2019-07-09 PROCEDURE — 82570 ASSAY OF URINE CREATININE: CPT

## 2019-07-09 ASSESSMENT — ENCOUNTER SYMPTOMS
BLOOD IN STOOL: 0
ABDOMINAL PAIN: 0
COUGH: 0
NAUSEA: 0
VOMITING: 0
WHEEZING: 0
CONSTIPATION: 0
DIARRHEA: 0
EYE DISCHARGE: 0
SHORTNESS OF BREATH: 0

## 2019-07-09 NOTE — PROGRESS NOTES
Device 0    Blood Glucose Monitoring Suppl (TRUE METRIX AIR GLUCOSE METER) W/DEVICE KIT Dispense Blood Glucose monitoring kit for diabetes ICD-10-CM: E11.65 1 kit 0    fluorouracil (EFUDEX) 5 % cream Apply twice daily to scalp for 2 weeks 40 g 0    Lancets MISC Test one time daily 100 each 3    aspirin 81 MG chewable tablet Take 81 mg by mouth daily.  TRUE METRIX BLOOD GLUCOSE TEST strip USE TO TEST BLOOD SUGAR TWICE DAILY FOR DIABETES 100 strip 0     No current facility-administered medications for this visit. Social History     Socioeconomic History    Marital status:       Spouse name: Not on file    Number of children: Not on file    Years of education: Not on file    Highest education level: Not on file   Occupational History    Not on file   Social Needs    Financial resource strain: Not on file    Food insecurity:     Worry: Not on file     Inability: Not on file    Transportation needs:     Medical: Not on file     Non-medical: Not on file   Tobacco Use    Smoking status: Former Smoker     Packs/day: 1.00     Years: 10.00     Pack years: 10.00     Types: Cigarettes     Last attempt to quit: 3/19/1978     Years since quittin.3    Smokeless tobacco: Never Used    Tobacco comment: quit 25 years ago   Substance and Sexual Activity    Alcohol use: Yes     Comment: on occasion    Drug use: No    Sexual activity: Not on file   Lifestyle    Physical activity:     Days per week: Not on file     Minutes per session: Not on file    Stress: Not on file   Relationships    Social connections:     Talks on phone: Not on file     Gets together: Not on file     Attends Yazidi service: Not on file     Active member of club or organization: Not on file     Attends meetings of clubs or organizations: Not on file     Relationship status: Not on file    Intimate partner violence:     Fear of current or ex partner: Not on file     Emotionally abused: Not on file     Physically edema or tenderness. Lymphadenopathy:     He has no cervical adenopathy. Neurological: He is alert and oriented to person, place, and time. Skin: Skin is warm and dry. No rash noted. He is not diaphoretic. No erythema. Psychiatric: Judgment normal.   1. Type 2 diabetes mellitus with hyperglycemia, unspecified whether long term insulin use ([facility] [address]  Praised him for the wonderful improvement in his health  Continue current medications  - POCT glycosylated hemoglobin (Hb A1C)    2. Essential hypertension  WNL; monitor    3.  Chronic kidney disease, stage III (moderate) (HCC)  FU with nephrology    Return in 3 months for DM med titration and AMW       ROXANA Marks - CNP

## 2019-07-17 ENCOUNTER — HOSPITAL ENCOUNTER (OUTPATIENT)
Age: 84
Discharge: HOME | End: 2019-07-17
Payer: MEDICARE

## 2019-07-17 LAB
ANION GAP SERPL CALCULATED.3IONS-SCNC: 11 MMOL/L (ref 3–16)
BUN BLDV-MCNC: 22 MG/DL (ref 7–20)
CALCIUM SERPL-MCNC: 9.7 MG/DL (ref 8.3–10.6)
CHLORIDE BLD-SCNC: 106 MMOL/L (ref 99–110)
CO2: 26 MMOL/L (ref 21–32)
CREAT SERPL-MCNC: 2.1 MG/DL (ref 0.8–1.3)
GFR AFRICAN AMERICAN: 37
GFR NON-AFRICAN AMERICAN: 30
GLUCOSE BLD-MCNC: 218 MG/DL (ref 70–99)
POTASSIUM SERPL-SCNC: 4.4 MMOL/L (ref 3.5–5.1)
SODIUM BLD-SCNC: 143 MMOL/L (ref 136–145)

## 2019-07-17 PROCEDURE — 80048 BASIC METABOLIC PNL TOTAL CA: CPT

## 2019-07-17 PROCEDURE — 36415 COLL VENOUS BLD VENIPUNCTURE: CPT

## 2019-10-01 ENCOUNTER — APPOINTMENT (OUTPATIENT)
Dept: GENERAL RADIOLOGY | Age: 84
End: 2019-10-01
Payer: MEDICARE

## 2019-10-01 ENCOUNTER — HOSPITAL ENCOUNTER (EMERGENCY)
Age: 84
Discharge: HOME | End: 2019-10-01
Attending: EMERGENCY MEDICINE
Payer: MEDICARE

## 2019-10-01 VITALS
HEIGHT: 64 IN | DIASTOLIC BLOOD PRESSURE: 61 MMHG | WEIGHT: 148 LBS | HEART RATE: 81 BPM | BODY MASS INDEX: 25.27 KG/M2 | OXYGEN SATURATION: 97 % | SYSTOLIC BLOOD PRESSURE: 152 MMHG | RESPIRATION RATE: 15 BRPM | TEMPERATURE: 98.3 F

## 2019-10-01 DIAGNOSIS — N18.9 CHRONIC KIDNEY DISEASE, UNSPECIFIED CKD STAGE: ICD-10-CM

## 2019-10-01 DIAGNOSIS — G25.0 ESSENTIAL TREMOR: Primary | ICD-10-CM

## 2019-10-01 DIAGNOSIS — D72.829 LEUKOCYTOSIS, UNSPECIFIED TYPE: ICD-10-CM

## 2019-10-01 LAB
A/G RATIO: 2 (ref 1.1–2.2)
ALBUMIN SERPL-MCNC: 4.3 G/DL (ref 3.4–5)
ALP BLD-CCNC: 44 U/L (ref 40–129)
ALT SERPL-CCNC: 17 U/L (ref 10–40)
ANION GAP SERPL CALCULATED.3IONS-SCNC: 12 MMOL/L (ref 3–16)
AST SERPL-CCNC: 21 U/L (ref 15–37)
BACTERIA: ABNORMAL /HPF
BILIRUB SERPL-MCNC: 0.9 MG/DL (ref 0–1)
BILIRUBIN URINE: NEGATIVE
BLOOD, URINE: ABNORMAL
BUN BLDV-MCNC: 17 MG/DL (ref 7–20)
CALCIUM SERPL-MCNC: 9.7 MG/DL (ref 8.3–10.6)
CHLORIDE BLD-SCNC: 106 MMOL/L (ref 99–110)
CLARITY: CLEAR
CO2: 23 MMOL/L (ref 21–32)
COLOR: YELLOW
CREAT SERPL-MCNC: 1.7 MG/DL (ref 0.8–1.3)
EPITHELIAL CELLS, UA: ABNORMAL /HPF
GFR AFRICAN AMERICAN: 47
GFR NON-AFRICAN AMERICAN: 39
GLOBULIN: 2.1 G/DL
GLUCOSE BLD-MCNC: 292 MG/DL (ref 70–99)
GLUCOSE URINE: 500 MG/DL
HCT VFR BLD CALC: 46.6 % (ref 40.5–52.5)
HEMOGLOBIN: 15.5 G/DL (ref 13.5–17.5)
KETONES, URINE: NEGATIVE MG/DL
LEUKOCYTE ESTERASE, URINE: NEGATIVE
MCH RBC QN AUTO: 29.3 PG (ref 26–34)
MCHC RBC AUTO-ENTMCNC: 33.3 G/DL (ref 31–36)
MCV RBC AUTO: 88 FL (ref 80–100)
MICROSCOPIC EXAMINATION: YES
NITRITE, URINE: NEGATIVE
PDW BLD-RTO: 15.7 % (ref 12.4–15.4)
PH UA: 6.5 (ref 5–8)
PLATELET # BLD: 110 K/UL (ref 135–450)
PMV BLD AUTO: 8.2 FL (ref 5–10.5)
POTASSIUM SERPL-SCNC: 4.2 MMOL/L (ref 3.5–5.1)
PROTEIN UA: 30 MG/DL
RAPID INFLUENZA  B AGN: NEGATIVE
RAPID INFLUENZA A AGN: NEGATIVE
RBC # BLD: 5.29 M/UL (ref 4.2–5.9)
RBC UA: ABNORMAL /HPF (ref 0–2)
SODIUM BLD-SCNC: 141 MMOL/L (ref 136–145)
SPECIFIC GRAVITY UA: 1.02 (ref 1–1.03)
TOTAL PROTEIN: 6.4 G/DL (ref 6.4–8.2)
TROPONIN: 0.02 NG/ML
TROPONIN: <0.01 NG/ML
URINE REFLEX TO CULTURE: ABNORMAL
URINE TYPE: ABNORMAL
UROBILINOGEN, URINE: 0.2 E.U./DL
WBC # BLD: 14.7 K/UL (ref 4–11)
WBC UA: ABNORMAL /HPF (ref 0–5)

## 2019-10-01 PROCEDURE — 71046 X-RAY EXAM CHEST 2 VIEWS: CPT

## 2019-10-01 PROCEDURE — 2580000003 HC RX 258: Performed by: NURSE PRACTITIONER

## 2019-10-01 PROCEDURE — 93005 ELECTROCARDIOGRAM TRACING: CPT | Performed by: NURSE PRACTITIONER

## 2019-10-01 PROCEDURE — 6370000000 HC RX 637 (ALT 250 FOR IP): Performed by: NURSE PRACTITIONER

## 2019-10-01 PROCEDURE — 84484 ASSAY OF TROPONIN QUANT: CPT

## 2019-10-01 PROCEDURE — 85027 COMPLETE CBC AUTOMATED: CPT

## 2019-10-01 PROCEDURE — 96360 HYDRATION IV INFUSION INIT: CPT

## 2019-10-01 PROCEDURE — 80053 COMPREHEN METABOLIC PANEL: CPT

## 2019-10-01 PROCEDURE — 81001 URINALYSIS AUTO W/SCOPE: CPT

## 2019-10-01 PROCEDURE — 87804 INFLUENZA ASSAY W/OPTIC: CPT

## 2019-10-01 PROCEDURE — 99284 EMERGENCY DEPT VISIT MOD MDM: CPT

## 2019-10-01 RX ORDER — ASPIRIN 325 MG
325 TABLET ORAL ONCE
Status: COMPLETED | OUTPATIENT
Start: 2019-10-01 | End: 2019-10-01

## 2019-10-01 RX ORDER — 0.9 % SODIUM CHLORIDE 0.9 %
500 INTRAVENOUS SOLUTION INTRAVENOUS ONCE
Status: COMPLETED | OUTPATIENT
Start: 2019-10-01 | End: 2019-10-01

## 2019-10-01 RX ADMIN — ASPIRIN 325 MG ORAL TABLET 325 MG: 325 PILL ORAL at 12:05

## 2019-10-01 RX ADMIN — SODIUM CHLORIDE 500 ML: 9 INJECTION, SOLUTION INTRAVENOUS at 09:49

## 2019-10-01 ASSESSMENT — ENCOUNTER SYMPTOMS
VOMITING: 0
ABDOMINAL PAIN: 0
NAUSEA: 0
SHORTNESS OF BREATH: 0
ABDOMINAL DISTENTION: 0
CONSTIPATION: 0
COUGH: 0
BACK PAIN: 0
EYES NEGATIVE: 1
ALLERGIC/IMMUNOLOGIC NEGATIVE: 1
DIARRHEA: 0
WHEEZING: 0

## 2019-10-02 LAB
EKG ATRIAL RATE: 100 BPM
EKG DIAGNOSIS: NORMAL
EKG P AXIS: 16 DEGREES
EKG P-R INTERVAL: 194 MS
EKG Q-T INTERVAL: 392 MS
EKG QRS DURATION: 156 MS
EKG QTC CALCULATION (BAZETT): 505 MS
EKG R AXIS: -67 DEGREES
EKG T AXIS: -4 DEGREES
EKG VENTRICULAR RATE: 100 BPM

## 2019-10-02 PROCEDURE — 93010 ELECTROCARDIOGRAM REPORT: CPT | Performed by: INTERNAL MEDICINE

## 2019-10-03 ENCOUNTER — OFFICE VISIT (OUTPATIENT)
Dept: INTERNAL MEDICINE CLINIC | Age: 84
End: 2019-10-03
Payer: MEDICARE

## 2019-10-03 VITALS
DIASTOLIC BLOOD PRESSURE: 70 MMHG | SYSTOLIC BLOOD PRESSURE: 130 MMHG | OXYGEN SATURATION: 98 % | BODY MASS INDEX: 26.78 KG/M2 | WEIGHT: 156 LBS | HEART RATE: 84 BPM

## 2019-10-03 DIAGNOSIS — E11.65 TYPE 2 DIABETES MELLITUS WITH HYPERGLYCEMIA, WITH LONG-TERM CURRENT USE OF INSULIN (HCC): ICD-10-CM

## 2019-10-03 DIAGNOSIS — F43.0 ANXIETY AS ACUTE REACTION TO EXCEPTIONAL STRESS: Primary | ICD-10-CM

## 2019-10-03 DIAGNOSIS — Z79.4 TYPE 2 DIABETES MELLITUS WITH HYPERGLYCEMIA, WITH LONG-TERM CURRENT USE OF INSULIN (HCC): ICD-10-CM

## 2019-10-03 DIAGNOSIS — F41.1 ANXIETY AS ACUTE REACTION TO EXCEPTIONAL STRESS: Primary | ICD-10-CM

## 2019-10-03 LAB — HBA1C MFR BLD: 8.2 %

## 2019-10-03 PROCEDURE — G8419 CALC BMI OUT NRM PARAM NOF/U: HCPCS | Performed by: NURSE PRACTITIONER

## 2019-10-03 PROCEDURE — G8427 DOCREV CUR MEDS BY ELIG CLIN: HCPCS | Performed by: NURSE PRACTITIONER

## 2019-10-03 PROCEDURE — 83036 HEMOGLOBIN GLYCOSYLATED A1C: CPT | Performed by: NURSE PRACTITIONER

## 2019-10-03 PROCEDURE — 4040F PNEUMOC VAC/ADMIN/RCVD: CPT | Performed by: NURSE PRACTITIONER

## 2019-10-03 PROCEDURE — 1036F TOBACCO NON-USER: CPT | Performed by: NURSE PRACTITIONER

## 2019-10-03 PROCEDURE — G8484 FLU IMMUNIZE NO ADMIN: HCPCS | Performed by: NURSE PRACTITIONER

## 2019-10-03 PROCEDURE — 1123F ACP DISCUSS/DSCN MKR DOCD: CPT | Performed by: NURSE PRACTITIONER

## 2019-10-03 PROCEDURE — 99213 OFFICE O/P EST LOW 20 MIN: CPT | Performed by: NURSE PRACTITIONER

## 2019-10-03 ASSESSMENT — ENCOUNTER SYMPTOMS
CONSTIPATION: 0
CHEST TIGHTNESS: 0
SHORTNESS OF BREATH: 0
SORE THROAT: 0
SINUS PAIN: 0
VOMITING: 0
DIARRHEA: 0
COUGH: 0
NAUSEA: 0

## 2019-10-29 ENCOUNTER — OFFICE VISIT (OUTPATIENT)
Dept: INTERNAL MEDICINE CLINIC | Age: 84
End: 2019-10-29
Payer: MEDICARE

## 2019-10-29 VITALS
SYSTOLIC BLOOD PRESSURE: 108 MMHG | HEART RATE: 117 BPM | WEIGHT: 154 LBS | DIASTOLIC BLOOD PRESSURE: 70 MMHG | OXYGEN SATURATION: 94 % | BODY MASS INDEX: 26.43 KG/M2

## 2019-10-29 DIAGNOSIS — R25.1 TREMOR: Primary | ICD-10-CM

## 2019-10-29 DIAGNOSIS — N18.30 CHRONIC KIDNEY DISEASE, STAGE III (MODERATE) (HCC): ICD-10-CM

## 2019-10-29 DIAGNOSIS — E78.2 MIXED HYPERLIPIDEMIA: ICD-10-CM

## 2019-10-29 DIAGNOSIS — E11.65 TYPE 2 DIABETES MELLITUS WITH HYPERGLYCEMIA, UNSPECIFIED WHETHER LONG TERM INSULIN USE (HCC): ICD-10-CM

## 2019-10-29 PROCEDURE — G0008 ADMIN INFLUENZA VIRUS VAC: HCPCS | Performed by: INTERNAL MEDICINE

## 2019-10-29 PROCEDURE — G8427 DOCREV CUR MEDS BY ELIG CLIN: HCPCS | Performed by: INTERNAL MEDICINE

## 2019-10-29 PROCEDURE — 1123F ACP DISCUSS/DSCN MKR DOCD: CPT | Performed by: INTERNAL MEDICINE

## 2019-10-29 PROCEDURE — 1036F TOBACCO NON-USER: CPT | Performed by: INTERNAL MEDICINE

## 2019-10-29 PROCEDURE — G8482 FLU IMMUNIZE ORDER/ADMIN: HCPCS | Performed by: INTERNAL MEDICINE

## 2019-10-29 PROCEDURE — 99214 OFFICE O/P EST MOD 30 MIN: CPT | Performed by: INTERNAL MEDICINE

## 2019-10-29 PROCEDURE — G8417 CALC BMI ABV UP PARAM F/U: HCPCS | Performed by: INTERNAL MEDICINE

## 2019-10-29 PROCEDURE — 90653 IIV ADJUVANT VACCINE IM: CPT | Performed by: INTERNAL MEDICINE

## 2019-10-29 PROCEDURE — 4040F PNEUMOC VAC/ADMIN/RCVD: CPT | Performed by: INTERNAL MEDICINE

## 2019-10-29 RX ORDER — GLIPIZIDE 10 MG/1
10 TABLET ORAL
COMMUNITY
End: 2019-11-17

## 2019-10-29 RX ORDER — TAMSULOSIN HYDROCHLORIDE 0.4 MG/1
0.4 CAPSULE ORAL DAILY
COMMUNITY

## 2019-11-14 ENCOUNTER — INITIAL CONSULT (OUTPATIENT)
Dept: NEUROLOGY | Age: 84
End: 2019-11-14
Payer: MEDICARE

## 2019-11-14 VITALS
HEART RATE: 103 BPM | OXYGEN SATURATION: 97 % | DIASTOLIC BLOOD PRESSURE: 92 MMHG | WEIGHT: 154 LBS | BODY MASS INDEX: 26.29 KG/M2 | HEIGHT: 64 IN | SYSTOLIC BLOOD PRESSURE: 150 MMHG

## 2019-11-14 DIAGNOSIS — I10 HTN (HYPERTENSION), BENIGN: ICD-10-CM

## 2019-11-14 DIAGNOSIS — E78.5 DYSLIPIDEMIA: ICD-10-CM

## 2019-11-14 DIAGNOSIS — G25.0 ESSENTIAL TREMOR: Primary | ICD-10-CM

## 2019-11-14 PROCEDURE — G8417 CALC BMI ABV UP PARAM F/U: HCPCS | Performed by: PSYCHIATRY & NEUROLOGY

## 2019-11-14 PROCEDURE — 1036F TOBACCO NON-USER: CPT | Performed by: PSYCHIATRY & NEUROLOGY

## 2019-11-14 PROCEDURE — 99203 OFFICE O/P NEW LOW 30 MIN: CPT | Performed by: PSYCHIATRY & NEUROLOGY

## 2019-11-14 PROCEDURE — 4040F PNEUMOC VAC/ADMIN/RCVD: CPT | Performed by: PSYCHIATRY & NEUROLOGY

## 2019-11-14 PROCEDURE — 1123F ACP DISCUSS/DSCN MKR DOCD: CPT | Performed by: PSYCHIATRY & NEUROLOGY

## 2019-11-14 PROCEDURE — G8482 FLU IMMUNIZE ORDER/ADMIN: HCPCS | Performed by: PSYCHIATRY & NEUROLOGY

## 2019-11-14 PROCEDURE — G8427 DOCREV CUR MEDS BY ELIG CLIN: HCPCS | Performed by: PSYCHIATRY & NEUROLOGY

## 2019-11-17 RX ORDER — GLIPIZIDE 10 MG/1
TABLET ORAL
Qty: 90 TABLET | Refills: 0 | Status: SHIPPED | OUTPATIENT
Start: 2019-11-17 | End: 2020-01-14 | Stop reason: CLARIF

## 2019-11-19 ENCOUNTER — OFFICE VISIT (OUTPATIENT)
Dept: DERMATOLOGY | Age: 84
End: 2019-11-19
Payer: MEDICARE

## 2019-11-19 DIAGNOSIS — L57.0 KERATOSIS, ACTINIC: Primary | ICD-10-CM

## 2019-11-19 DIAGNOSIS — Z85.828 HISTORY OF BASAL CELL CANCER: ICD-10-CM

## 2019-11-19 DIAGNOSIS — D22.9 BENIGN NEVUS: ICD-10-CM

## 2019-11-19 PROCEDURE — 99213 OFFICE O/P EST LOW 20 MIN: CPT | Performed by: DERMATOLOGY

## 2019-11-19 PROCEDURE — 1123F ACP DISCUSS/DSCN MKR DOCD: CPT | Performed by: DERMATOLOGY

## 2019-11-19 PROCEDURE — G8482 FLU IMMUNIZE ORDER/ADMIN: HCPCS | Performed by: DERMATOLOGY

## 2019-11-19 PROCEDURE — 4040F PNEUMOC VAC/ADMIN/RCVD: CPT | Performed by: DERMATOLOGY

## 2019-11-19 PROCEDURE — 17000 DESTRUCT PREMALG LESION: CPT | Performed by: DERMATOLOGY

## 2019-11-19 PROCEDURE — G8427 DOCREV CUR MEDS BY ELIG CLIN: HCPCS | Performed by: DERMATOLOGY

## 2019-11-19 PROCEDURE — G8417 CALC BMI ABV UP PARAM F/U: HCPCS | Performed by: DERMATOLOGY

## 2019-11-19 PROCEDURE — 1036F TOBACCO NON-USER: CPT | Performed by: DERMATOLOGY

## 2019-11-19 RX ORDER — FLUOROURACIL 50 MG/G
CREAM TOPICAL
Qty: 40 G | Refills: 0 | Status: SHIPPED | OUTPATIENT
Start: 2019-11-19 | End: 2021-01-01 | Stop reason: ALTCHOICE

## 2019-12-03 ENCOUNTER — CARE COORDINATION (OUTPATIENT)
Dept: CARE COORDINATION | Age: 84
End: 2019-12-03

## 2020-01-01 RX ORDER — PEN NEEDLE, DIABETIC 31 GX5/16"
NEEDLE, DISPOSABLE MISCELLANEOUS
Qty: 100 EACH | Refills: 4 | Status: SHIPPED | OUTPATIENT
Start: 2020-01-01

## 2020-01-02 ENCOUNTER — OFFICE VISIT (OUTPATIENT)
Dept: INTERNAL MEDICINE CLINIC | Age: 85
End: 2020-01-02
Payer: MEDICARE

## 2020-01-02 ENCOUNTER — HOSPITAL ENCOUNTER (OUTPATIENT)
Age: 85
Discharge: HOME | End: 2020-01-02
Payer: MEDICARE

## 2020-01-02 ENCOUNTER — CARE COORDINATION (OUTPATIENT)
Dept: CARE COORDINATION | Age: 85
End: 2020-01-02

## 2020-01-02 VITALS — OXYGEN SATURATION: 98 % | HEART RATE: 100 BPM | DIASTOLIC BLOOD PRESSURE: 80 MMHG | SYSTOLIC BLOOD PRESSURE: 120 MMHG

## 2020-01-02 LAB
A/G RATIO: 1.8 (ref 1.1–2.2)
ALBUMIN SERPL-MCNC: 4.4 G/DL (ref 3.4–5)
ALP BLD-CCNC: 52 U/L (ref 40–129)
ALT SERPL-CCNC: 20 U/L (ref 10–40)
AMORPHOUS: ABNORMAL /HPF
ANION GAP SERPL CALCULATED.3IONS-SCNC: 15 MMOL/L (ref 3–16)
AST SERPL-CCNC: 23 U/L (ref 15–37)
BACTERIA: ABNORMAL /HPF
BILIRUB SERPL-MCNC: 1.1 MG/DL (ref 0–1)
BILIRUBIN URINE: NEGATIVE
BLOOD, URINE: ABNORMAL
BUN BLDV-MCNC: 20 MG/DL (ref 7–20)
CALCIUM SERPL-MCNC: 10 MG/DL (ref 8.3–10.6)
CHLORIDE BLD-SCNC: 103 MMOL/L (ref 99–110)
CHOLESTEROL, TOTAL: 103 MG/DL (ref 0–199)
CLARITY: ABNORMAL
CO2: 24 MMOL/L (ref 21–32)
COLOR: YELLOW
CREAT SERPL-MCNC: 2.1 MG/DL (ref 0.8–1.3)
CREATININE URINE: 137 MG/DL (ref 39–259)
EPITHELIAL CELLS, UA: ABNORMAL /HPF
GFR AFRICAN AMERICAN: 37
GFR NON-AFRICAN AMERICAN: 30
GLOBULIN: 2.5 G/DL
GLUCOSE BLD-MCNC: 144 MG/DL (ref 70–99)
GLUCOSE URINE: 100 MG/DL
HDLC SERPL-MCNC: 30 MG/DL (ref 40–60)
KETONES, URINE: NEGATIVE MG/DL
LDL CHOLESTEROL CALCULATED: 25 MG/DL
LEUKOCYTE ESTERASE, URINE: ABNORMAL
MICROSCOPIC EXAMINATION: YES
NITRITE, URINE: NEGATIVE
PH UA: 5.5 (ref 5–8)
PHOSPHORUS: 2.3 MG/DL (ref 2.5–4.9)
POTASSIUM SERPL-SCNC: 4.3 MMOL/L (ref 3.5–5.1)
PROTEIN PROTEIN: 86 MG/DL
PROTEIN UA: 100 MG/DL
PROTEIN/CREAT RATIO: 0.6 MG/DL
RBC UA: ABNORMAL /HPF (ref 0–2)
SODIUM BLD-SCNC: 142 MMOL/L (ref 136–145)
SPECIFIC GRAVITY UA: >=1.03 (ref 1–1.03)
TOTAL PROTEIN: 6.9 G/DL (ref 6.4–8.2)
TRIGL SERPL-MCNC: 239 MG/DL (ref 0–150)
URINE TYPE: ABNORMAL
UROBILINOGEN, URINE: 0.2 E.U./DL
VLDLC SERPL CALC-MCNC: 48 MG/DL
WBC UA: >100 /HPF (ref 0–5)

## 2020-01-02 PROCEDURE — 83036 HEMOGLOBIN GLYCOSYLATED A1C: CPT

## 2020-01-02 PROCEDURE — G8417 CALC BMI ABV UP PARAM F/U: HCPCS | Performed by: INTERNAL MEDICINE

## 2020-01-02 PROCEDURE — 84156 ASSAY OF PROTEIN URINE: CPT

## 2020-01-02 PROCEDURE — G8482 FLU IMMUNIZE ORDER/ADMIN: HCPCS | Performed by: INTERNAL MEDICINE

## 2020-01-02 PROCEDURE — 80061 LIPID PANEL: CPT

## 2020-01-02 PROCEDURE — 4040F PNEUMOC VAC/ADMIN/RCVD: CPT | Performed by: INTERNAL MEDICINE

## 2020-01-02 PROCEDURE — 36415 COLL VENOUS BLD VENIPUNCTURE: CPT

## 2020-01-02 PROCEDURE — 80053 COMPREHEN METABOLIC PANEL: CPT

## 2020-01-02 PROCEDURE — 84100 ASSAY OF PHOSPHORUS: CPT

## 2020-01-02 PROCEDURE — 82570 ASSAY OF URINE CREATININE: CPT

## 2020-01-02 PROCEDURE — 3288F FALL RISK ASSESSMENT DOCD: CPT | Performed by: INTERNAL MEDICINE

## 2020-01-02 PROCEDURE — 99214 OFFICE O/P EST MOD 30 MIN: CPT | Performed by: INTERNAL MEDICINE

## 2020-01-02 PROCEDURE — G8427 DOCREV CUR MEDS BY ELIG CLIN: HCPCS | Performed by: INTERNAL MEDICINE

## 2020-01-02 PROCEDURE — 81001 URINALYSIS AUTO W/SCOPE: CPT

## 2020-01-02 PROCEDURE — 1036F TOBACCO NON-USER: CPT | Performed by: INTERNAL MEDICINE

## 2020-01-02 PROCEDURE — 1123F ACP DISCUSS/DSCN MKR DOCD: CPT | Performed by: INTERNAL MEDICINE

## 2020-01-02 PROCEDURE — G8510 SCR DEP NEG, NO PLAN REQD: HCPCS | Performed by: INTERNAL MEDICINE

## 2020-01-02 ASSESSMENT — PATIENT HEALTH QUESTIONNAIRE - PHQ9
1. LITTLE INTEREST OR PLEASURE IN DOING THINGS: 0
2. FEELING DOWN, DEPRESSED OR HOPELESS: 0
SUM OF ALL RESPONSES TO PHQ9 QUESTIONS 1 & 2: 0
SUM OF ALL RESPONSES TO PHQ QUESTIONS 1-9: 0
SUM OF ALL RESPONSES TO PHQ QUESTIONS 1-9: 0

## 2020-01-02 NOTE — PROGRESS NOTES
Subjective:      Patient ID: Ramiro Ruiz is a 81 y.o. male. HPI      Here for f/u hyperlipidemia, htn, dm, renal insufficiency. Tremors stable. Makes it difficult for write and eat. Fasting blood sugars have improved since adding insulin. Denies hypoglycemic episodes. Takes his crestor in the evenings and it has improved. Has been avoiding otc nephrotoxins. Being followed by Nephrology for renal insufficiency which has improved since right ureteroscopy, stone manipulation and stent placement      Review of Systems   Constitutional: Negative for unexpected weight change. Respiratory: Negative for cough, chest tightness, shortness of breath and wheezing. Cardiovascular: Negative for chest pain, palpitations and leg swelling. Gastrointestinal: Negative for abdominal distention, constipation, diarrhea, nausea and vomiting. Musculoskeletal: Negative for arthralgias, back pain and myalgias. Skin: Negative for rash. Neurological: Negative for  and numbness. +tremor  All other systems reviewed and are negative. Objective:   Physical Exam   Constitutional: He is oriented to person, place, and time. He appears well-developed and well-nourished. No distress. Neck: Neck supple. No tracheal deviation present. No thyromegaly present. full rom  Cardiovascular: Normal rate, regular rhythm and intact distal pulses. Murmur heard. Pulmonary/Chest: Effort normal and breath sounds normal. No respiratory distress. He has no wheezes. He has no rales. He exhibits no tenderness. Abdominal: Soft. He exhibits no distension. There is no tenderness. There is no rebound and no guarding. No hepatosplenomegaly   Musculoskeletal: He exhibits no edema. Neurological: He is alert and oriented to person, place, and time. Skin: He is not diaphoretic. Psychiatric: He has a normal mood and affect. His behavior is normal. Judgment and thought content normal.   Vitals reviewed.       Assessment: Tremor  Stable. Continue to monitor    Chronic kidney disease, stage III (moderate) (HCC)  Stable. Continue to monitor. Follow-up with nephrology      Type 2 diabetes mellitus with hyperglycemia, unspecified whether long term insulin use (HCC)  Improved. Continue to monitor    Hyperlipidemia  Controlled.                Plan:      See above plan

## 2020-01-03 LAB
ESTIMATED AVERAGE GLUCOSE: 217.3 MG/DL
HBA1C MFR BLD: 9.2 %

## 2020-01-06 RX ORDER — SULFAMETHOXAZOLE AND TRIMETHOPRIM 800; 160 MG/1; MG/1
1 TABLET ORAL DAILY
Qty: 7 TABLET | Refills: 0 | Status: SHIPPED | OUTPATIENT
Start: 2020-01-06 | End: 2020-01-13

## 2020-01-14 ENCOUNTER — OFFICE VISIT (OUTPATIENT)
Dept: INTERNAL MEDICINE CLINIC | Age: 85
End: 2020-01-14
Payer: MEDICARE

## 2020-01-14 VITALS
DIASTOLIC BLOOD PRESSURE: 70 MMHG | HEIGHT: 67 IN | SYSTOLIC BLOOD PRESSURE: 122 MMHG | OXYGEN SATURATION: 97 % | BODY MASS INDEX: 23.54 KG/M2 | WEIGHT: 150 LBS | HEART RATE: 129 BPM

## 2020-01-14 LAB
BILIRUBIN, POC: ABNORMAL
BLOOD URINE, POC: ABNORMAL
CLARITY, POC: CLEAR
COLOR, POC: YELLOW
GLUCOSE URINE, POC: ABNORMAL
KETONES, POC: ABNORMAL
LEUKOCYTE EST, POC: ABNORMAL
NITRITE, POC: ABNORMAL
PH, POC: 5
PROTEIN, POC: ABNORMAL
SPECIFIC GRAVITY, POC: 1.01
UROBILINOGEN, POC: 0.2

## 2020-01-14 PROCEDURE — 1123F ACP DISCUSS/DSCN MKR DOCD: CPT | Performed by: NURSE PRACTITIONER

## 2020-01-14 PROCEDURE — G8482 FLU IMMUNIZE ORDER/ADMIN: HCPCS | Performed by: NURSE PRACTITIONER

## 2020-01-14 PROCEDURE — G0439 PPPS, SUBSEQ VISIT: HCPCS | Performed by: NURSE PRACTITIONER

## 2020-01-14 PROCEDURE — 4040F PNEUMOC VAC/ADMIN/RCVD: CPT | Performed by: NURSE PRACTITIONER

## 2020-01-14 ASSESSMENT — PATIENT HEALTH QUESTIONNAIRE - PHQ9
SUM OF ALL RESPONSES TO PHQ QUESTIONS 1-9: 0
SUM OF ALL RESPONSES TO PHQ QUESTIONS 1-9: 0

## 2020-01-14 ASSESSMENT — LIFESTYLE VARIABLES: HOW OFTEN DO YOU HAVE A DRINK CONTAINING ALCOHOL: 0

## 2020-01-14 NOTE — PROGRESS NOTES
Medicare Annual Wellness Visit  Name: Alysha Born Date: 2019   MRN: <[id number]> Sex: Male   Age: 81 y.o. Ethnicity: Non-/Non    : 1935 Race: Meño Jameson is here for Medicare AWV    Screenings for behavioral, psychosocial and functional/safety risks, and cognitive dysfunction are all negative except as indicated below. These results, as well as other patient data from the [address] form, are documented in Flowsheets linked to this Encounter. No Known Allergies    Prior to Visit Medications    Medication Sig Taking? Authorizing Provider   fluorouracil (EFUDEX) 5 % cream Apply twice daily to scalp for 2 weeks Yes Norma Encarnacion MD   TRUE METRIX BLOOD GLUCOSE TEST strip USE TO TEST BLOOD SUGAR TWO TIMES A DAY FOR DIABETES Yes Paulina Womack MD   tamsulosin (FLOMAX) 0.4 MG capsule Take 0.4 mg by mouth daily Yes Historical Provider, MD MAYNARD-MOHAN UF III MINI PEN NEEDLES 31G X 5 MM MISC USE ONE - DAILY Yes Paulina Womack MD   rosuvastatin (CRESTOR) 40 MG tablet TAKE ONE TABLET BY MOUTH DAILY Yes MD GIN Rodriguez SOLOSTAR 300 UNIT/ML injection pen INJECT 10 UNITS UNDER THE SKIN DAILY Yes Paulina Womack MD   acetaminophen (TYLENOL) 500 MG tablet Take 500 mg by mouth every 6 hours as needed for Pain Yes Historical Provider, MD   Cholecalciferol (VITAMIN D3) 2000 units CAPS Take by mouth Yes Historical Provider, MD   Blood Glucose Monitoring Suppl (TRUE METRIX AIR GLUCOSE METER) [address] E11.65 Yes Paulina Womack MD   Blood Glucose Monitoring Suppl (TRUE METRIX AIR GLUCOSE METER) W/DEVICE KIT Dispense Blood Glucose monitoring kit for diabetes ICD-10-CM: E11.65 Yes Paulina Womack MD   Lancets MISC Test one time daily Yes Paulina Womack MD   aspirin 81 MG chewable tablet Take 81 mg by mouth daily.    Yes Historical Provider, MD       Past Medical History:   Diagnosis Date    Back pain     Cancer ([address]     skin    Chronic kidney disease     Diabetes mellitus ([facility] [address]     Essential tremor     History of BPH     Hypercholesteremia     Hypertension     Renal insufficiency     Type 2 diabetes mellitus with hyperglycemia ([facility] [address] 12/21/2017       Past Surgical History:   Procedure Laterality Date    CATARACT REMOVAL      CYSTOSCOPY      HERNIA REPAIR      KYPHOSIS SURGERY  8/24/2012    KYPHOPLASTY T10 AND T12       Family History   Problem Relation Age of Onset    Heart Disease Father     Heart Disease Sister        CareTeam (Including outside providers/suppliers regularly involved in providing care):   Patient Care Team:  Suzy Stout MD as PCP - Domitila Tsang MD as PCP - [facility] Empaneled Provider  Rich Faulkner MD as Consulting Physician (Urology)  Abigail Lau MD as Consulting Physician (Nephrology)  Delma Guaman MD (Orthopedic Surgery)  Suzy Stout MD as Consulting Physician (Internal Medicine)    Wt Readings from Last 3 Encounters:   01/14/20 150 lb (68 kg)   11/14/19 154 lb (69.9 kg)   10/29/19 154 lb (69.9 kg)     Vitals:    01/14/20 1028   BP: 122/70   Pulse: 129   SpO2: 97%   Weight: 150 lb (68 kg)   Height: 5' 6.5\" (1.689 m)     Body mass index is 23.85 kg/m². Based upon direct observation of the patient, evaluation of cognition reveals recent and remote memory intact.     General Appearance: alert and oriented to person, place and time, well developed and well- nourished, in no acute distress  Skin: warm and dry, no rash or erythema  Head: normocephalic and atraumatic  Eyes: pupils equal, round, and reactive to light, extraocular eye movements intact, conjunctivae normal  ENT: tympanic membrane, external ear and ear canal normal bilaterally, nose without deformity, nasal mucosa and turbinates normal without polyps  Neck: supple and non-tender without mass, no thyromegaly or thyroid nodules, no cervical lymphadenopathy  Pulmonary/Chest: clear to auscultation bilaterally- no wheezes, rales or rhonchi, normal air (Fluzone 65 yrs and older) 12/17/2015, 12/23/2016, 12/21/2017    Influenza, Triv, inactivated, subunit, adjuvanted, IM (Fluad 65 yrs and older) 10/29/2019    Pneumococcal Conjugate 13-valent (Upaakwp46) 12/17/2015    Pneumococcal Conjugate 7-valent (Prevnar7) 10/10/2005    Pneumococcal Polysaccharide ([id number]) 06/29/2017        Health Maintenance   Topic Date Due    Shingles Vaccine (1 of 2) 11/21/1986    DTaP/Tdap/Td vaccine (2 - Tdap) 10/24/2016    Annual Wellness Visit (AWV)  05/29/2019    Diabetic foot exam  07/09/2020 (Originally 6/21/2019)    Lipid screen  01/02/2021    Potassium monitoring  01/02/2021    Creatinine monitoring  01/02/2021    Flu vaccine  Completed    Pneumococcal 65+ years Vaccine  Completed     Recommendations for Preventive Services Due: see orders and patient instructions/AVS.  . Recommended screening schedule for the next 5-10 years is provided to the patient in written form: see Patient Instructions/AVS.    There are no diagnoses linked to this encounter. Repeat UA done today per Dr. Iram Gibson after being treated with bactrim for UTI. Pt has long hx of hematuria with a known kidney stone. UA improved    CKD with gfr of 30.  Followed by Dr. Parviz Esposito with appointment this week

## 2020-01-14 NOTE — PATIENT INSTRUCTIONS
Personalized Preventive Plan for Suad Erickson - 1/14/2020  Medicare offers a range of preventive health benefits. Some of the tests and screenings are paid in full while other may be subject to a deductible, co-insurance, and/or copay. Some of these benefits include a comprehensive review of your medical history including lifestyle, illnesses that may run in your family, and various assessments and screenings as appropriate. After reviewing your medical record and screening and assessments performed today your provider may have ordered immunizations, labs, imaging, and/or referrals for you. A list of these orders (if applicable) as well as your Preventive Care list are included within your After Visit Summary for your review. Other Preventive Recommendations:    · A preventive eye exam performed by an eye specialist is recommended every 1-2 years to screen for glaucoma; cataracts, macular degeneration, and other eye disorders. · A preventive dental visit is recommended every 6 months. · Try to get at least 150 minutes of exercise per week or 10,000 steps per day on a pedometer . · Order or download the FREE \"Exercise & Physical Activity: Your Everyday Guide\" from [employer]. Call 6-494.853.6260 or search [employer] online. · You need 9868-3089 mg of calcium and 3412-8778 IU of vitamin D per day. It is possible to meet your calcium requirement with diet alone, but a vitamin D supplement is usually necessary to meet this goal.  · When exposed to the sun, use a sunscreen that protects against both UVA and UVB radiation with an SPF of 30 or greater. Reapply every 2 to 3 hours or after sweating, drying off with a towel, or swimming. · Always wear a seat belt when traveling in a car. Always wear a helmet when riding a bicycle or motorcycle.     Stop glipizide due to kidney function  Continue toujeo--may use legs for injection sites  Return to see Contreras Abbott in 4 weeks

## 2020-01-18 ENCOUNTER — HOSPITAL ENCOUNTER (EMERGENCY)
Age: 85
Discharge: HOME | End: 2020-01-18
Payer: MEDICARE

## 2020-01-18 VITALS
SYSTOLIC BLOOD PRESSURE: 155 MMHG | DIASTOLIC BLOOD PRESSURE: 98 MMHG | TEMPERATURE: 98.3 F | RESPIRATION RATE: 18 BRPM | OXYGEN SATURATION: 100 % | HEIGHT: 65 IN | WEIGHT: 150 LBS | HEART RATE: 78 BPM | BODY MASS INDEX: 24.99 KG/M2

## 2020-01-18 PROCEDURE — 6370000000 HC RX 637 (ALT 250 FOR IP): Performed by: PHYSICIAN ASSISTANT

## 2020-01-18 PROCEDURE — 99282 EMERGENCY DEPT VISIT SF MDM: CPT

## 2020-01-18 RX ORDER — CEPHALEXIN 500 MG/1
500 CAPSULE ORAL 3 TIMES DAILY
Qty: 21 CAPSULE | Refills: 0 | Status: SHIPPED | OUTPATIENT
Start: 2020-01-18 | End: 2020-01-25

## 2020-01-18 RX ORDER — CEPHALEXIN 250 MG/1
500 CAPSULE ORAL ONCE
Status: COMPLETED | OUTPATIENT
Start: 2020-01-18 | End: 2020-01-18

## 2020-01-18 RX ADMIN — MUPIROCIN: 20 OINTMENT TOPICAL at 19:39

## 2020-01-18 RX ADMIN — CEPHALEXIN 500 MG: 250 CAPSULE ORAL at 19:39

## 2020-01-18 ASSESSMENT — PAIN SCALES - GENERAL: PAINLEVEL_OUTOF10: 8

## 2020-01-19 NOTE — ED PROVIDER NOTES
file    Years of education: Not on file    Highest education level: Not on file   Occupational History    Not on file   Social Needs    Financial resource strain: Not on file    Food insecurity:     Worry: Not on file     Inability: Not on file    Transportation needs:     Medical: Not on file     Non-medical: Not on file   Tobacco Use    Smoking status: Former Smoker     Packs/day: 1.00     Years: 10.00     Pack years: 10.00     Types: Cigarettes     Last attempt to quit: 3/19/1978     Years since quittin.8    Smokeless tobacco: Never Used    Tobacco comment: quit 25 years ago   Substance and Sexual Activity    Alcohol use: Yes     Comment: on occasion    Drug use: No    Sexual activity: Not on file   Lifestyle    Physical activity:     Days per week: Not on file     Minutes per session: Not on file    Stress: Not on file   Relationships    Social connections:     Talks on phone: Not on file     Gets together: Not on file     Attends Taoism service: Not on file     Active member of club or organization: Not on file     Attends meetings of clubs or organizations: Not on file     Relationship status: Not on file    Intimate partner violence:     Fear of current or ex partner: Not on file     Emotionally abused: Not on file     Physically abused: Not on file     Forced sexual activity: Not on file   Other Topics Concern    Not on file   Social History Narrative    Not on file     Current Facility-Administered Medications   Medication Dose Route Frequency Provider Last Rate Last Dose    mupirocin (BACTROBAN) 2 % ointment   Topical TID Renne Lennox, PA        cephALEXin (KEFLEX) capsule 500 mg  500 mg Oral Once Renne Lennox, PA         Current Outpatient Medications   Medication Sig Dispense Refill    cephALEXin (KEFLEX) 500 MG capsule Take 1 capsule by mouth 3 times daily for 7 days 21 capsule 0    fluorouracil (EFUDEX) 5 % cream Apply twice daily to scalp for 2 weeks 40 g 0    TRUE METRIX BLOOD GLUCOSE TEST strip USE TO TEST BLOOD SUGAR TWO TIMES A DAY FOR DIABETES 100 strip 5    tamsulosin (FLOMAX) 0.4 MG capsule Take 0.4 mg by mouth daily      B-D UF III MINI PEN NEEDLES 31G X 5 MM MISC USE ONE - DAILY 100 each 5    rosuvastatin (CRESTOR) 40 MG tablet TAKE ONE TABLET BY MOUTH DAILY 90 tablet 3    TOUJEO SOLOSTAR 300 UNIT/ML injection pen INJECT 10 UNITS UNDER THE SKIN DAILY 4.5 pen 1    acetaminophen (TYLENOL) 500 MG tablet Take 500 mg by mouth every 6 hours as needed for Pain      Cholecalciferol (VITAMIN D3) 2000 units CAPS Take by mouth      Blood Glucose Monitoring Suppl (TRUE METRIX AIR GLUCOSE METER) ANDRES E11.65 1 Device 0    Blood Glucose Monitoring Suppl (TRUE METRIX AIR GLUCOSE METER) W/DEVICE KIT Dispense Blood Glucose monitoring kit for diabetes ICD-10-CM: E11.65 1 kit 0    Lancets MISC Test one time daily 100 each 3    aspirin 81 MG chewable tablet Take 81 mg by mouth daily. No Known Allergies    REVIEW OF SYSTEMS:  6 systems reviewed, pertinent positives per HPI otherwise noted to be negative. PHYSICAL EXAM:  BP (!) 140/81   Pulse 95   Temp 97.9 °F (36.6 °C) (Oral)   Resp 16   Ht 5' 5\" (1.651 m)   Wt 150 lb (68 kg)   SpO2 96%   BMI 24.96 kg/m²   CONSTITUTIONAL: Awake and alert. Well-developed. Well-nourished. Non-toxic. Cooperative. No acute distress. HENT: Normocephalic. Atraumatic. External ears normal, without discharge. Nose normal. Mucous membranes moist.  EYES: Conjunctiva non-injected. No scleral icterus. PERRL. EOM's grossly intact. NECK: Supple. Normal ROM. CARDIOVASCULAR: Normal heart rate. Intact distal pulses. PULMONARY/CHEST WALL: Breathing is unlabored. Equal, symmetric chest rise. Speaking comfortably in full sentences. ABDOMEN: Nondistended  MUSKULOSKELETAL: Normal ROM. No acute deformities. SKIN: Warm and dry. Dry, erythematous perioral rash. No blisters or vesicular lesions. NEUROLOGICAL: Alert and oriented x 3. Strength is 5/5 in all extremities and sensation is intact. PSYCHIATRIC: Normal affect    Labs:    None    RADIOLOGY:    None            ED COURSE/MDM:  Patient was given the following medications:  Medications   mupirocin (BACTROBAN) 2 % ointment ( Topical Given 1/18/20 1939)   cephALEXin (KEFLEX) capsule 500 mg (500 mg Oral Given 1/18/20 1939)       I have evaluated this patient here in the ED. Patient is here with a perioral rash that is mildly painful with \"burning\" sensation as well as itching. It is dry without the typical honey crusted lesions of impetigo. There are no herpetic lesions/vesicles. It does not particularly appear consistent with rosacea although all of these are considered. I recommended Benadryl for the itching. He will be given Bactroban ointment to place on the rash and additionally oral Keflex. I did recommend close follow-up with primary care or dermatology this week. Patient was given scripts for the following medications. I counseled patient how to take these medications. New Prescriptions    CEPHALEXIN (KEFLEX) 500 MG CAPSULE    Take 1 capsule by mouth 3 times daily for 7 days     I estimate there is LOW risk for ABSCESS, COMPARTMENT SYNDROME, NECROTIZING FASCIITIS, TENDON OR NEUROVASCULAR INJURY, or RETAINED FOREIGN BODY, thus I consider the discharge disposition reasonable. Also, there is no evidence or peritonitis, sepsis, or toxicity. Ivan Hernandez and I have discussed the diagnosis and risks, and we agree with discharging home to follow-up with their primary doctor. We also discussed returning to the Emergency Department immediately if new or worsening symptoms occur. We have discussed the symptoms which are most concerning (e.g., changing or worsening pain, fever, numbness, weakness, cool or painful digits) that necessitate immediate return. CLINICAL IMPRESSION:  1.  Perioral dermatitis        Blood pressure (!) 140/81, pulse 95, temperature 97.9 °F (36.6 °C), temperature source Oral, resp. rate 16, height 5' 5\" (1.651 m), weight 150 lb (68 kg), SpO2 96 %. PATIENT REFERRED TO:  Cary Jean MD  [address] [facility] [address]  [place] [id number] [phone]    Schedule an appointment as soon as possible for a visit   follow up with PCP or dermatology this coming week for recheck        DISPOSITION  Patient was discharged to home in good condition.           Renne Lennox, [place]  01/18/20 1942

## 2020-01-27 ENCOUNTER — OFFICE VISIT (OUTPATIENT)
Dept: INTERNAL MEDICINE CLINIC | Age: 85
End: 2020-01-27
Payer: MEDICARE

## 2020-01-27 VITALS
SYSTOLIC BLOOD PRESSURE: 136 MMHG | DIASTOLIC BLOOD PRESSURE: 80 MMHG | HEART RATE: 126 BPM | WEIGHT: 157 LBS | BODY MASS INDEX: 26.13 KG/M2 | OXYGEN SATURATION: 98 %

## 2020-01-27 PROCEDURE — 99214 OFFICE O/P EST MOD 30 MIN: CPT | Performed by: NURSE PRACTITIONER

## 2020-01-27 PROCEDURE — 4040F PNEUMOC VAC/ADMIN/RCVD: CPT | Performed by: NURSE PRACTITIONER

## 2020-01-27 PROCEDURE — 93000 ELECTROCARDIOGRAM COMPLETE: CPT | Performed by: NURSE PRACTITIONER

## 2020-01-27 PROCEDURE — G8417 CALC BMI ABV UP PARAM F/U: HCPCS | Performed by: NURSE PRACTITIONER

## 2020-01-27 PROCEDURE — G8482 FLU IMMUNIZE ORDER/ADMIN: HCPCS | Performed by: NURSE PRACTITIONER

## 2020-01-27 PROCEDURE — 1036F TOBACCO NON-USER: CPT | Performed by: NURSE PRACTITIONER

## 2020-01-27 PROCEDURE — G8427 DOCREV CUR MEDS BY ELIG CLIN: HCPCS | Performed by: NURSE PRACTITIONER

## 2020-01-27 PROCEDURE — 1123F ACP DISCUSS/DSCN MKR DOCD: CPT | Performed by: NURSE PRACTITIONER

## 2020-01-27 ASSESSMENT — ENCOUNTER SYMPTOMS
DIARRHEA: 0
FACIAL SWELLING: 0
SINUS PRESSURE: 0
COUGH: 0
SORE THROAT: 0
NAUSEA: 0
VOMITING: 0

## 2020-01-27 NOTE — PROGRESS NOTES
Lauren Prabhakar  1935      HPI:  Chief Complaint   Patient presents with    Numbness     around face and chin , went to ED, tx: cephlaxin and mupiricon     Rash     Pt started symtpoms 1/16 and noticed spontaneous numbness to chin/mouth and rash started 2 days later around the mouth. Went to ER and they treated patient for perioral dermatitis with Keflex and mupirocin ointment. He has no more rash but the numbness is now intermittent. Tongue has also had some change in sensation. No sores in the mouth. No dysphagia. No sore throat. No hx of these symptoms. Tachycardic today in office, states he feels anxious but this is normal for him. /80 (Site: Right Upper Arm, Position: Sitting, Cuff Size: Large Adult)   Pulse 126   Wt 157 lb (71.2 kg)   SpO2 98%   BMI 26.13 kg/m²     Prior to Visit Medications    Medication Sig Taking?  Authorizing Provider   nystatin (MYCOSTATIN) 867751 UNIT/ML suspension Take 5 mLs by mouth 4 times daily Swish/spit x 7 days Yes ROXANA Dahl - CNP   fluorouracil (EFUDEX) 5 % cream Apply twice daily to scalp for 2 weeks Yes Jessy Bautista MD   TRUE METRIX BLOOD GLUCOSE TEST strip USE TO TEST BLOOD SUGAR TWO TIMES A DAY FOR DIABETES Yes Marco Quiroz MD   tamsulosin (FLOMAX) 0.4 MG capsule Take 0.4 mg by mouth daily Yes Historical Provider, MD MASCORRO UF III MINI PEN NEEDLES 31G X 5 MM MISC USE ONE - DAILY Yes Marco Quiroz MD   rosuvastatin (CRESTOR) 40 MG tablet TAKE ONE TABLET BY MOUTH DAILY Yes MD LEELEE PuenteUJEO SOLOSTAR 300 UNIT/ML injection pen INJECT 10 UNITS UNDER THE SKIN DAILY Yes Marco Quiroz MD   acetaminophen (TYLENOL) 500 MG tablet Take 500 mg by mouth every 6 hours as needed for Pain Yes Historical Provider, MD   Cholecalciferol (VITAMIN D3) 2000 units CAPS Take by mouth Yes Historical Provider, MD   Blood Glucose Monitoring Suppl (TRUE METRIX AIR GLUCOSE METER) Rohit Garcia E11.65 Yes Marco Quiroz MD   Blood Glucose

## 2020-02-05 ENCOUNTER — OFFICE VISIT (OUTPATIENT)
Dept: INTERNAL MEDICINE CLINIC | Age: 85
End: 2020-02-05
Payer: MEDICARE

## 2020-02-05 VITALS
BODY MASS INDEX: 26.13 KG/M2 | HEART RATE: 102 BPM | OXYGEN SATURATION: 99 % | SYSTOLIC BLOOD PRESSURE: 128 MMHG | DIASTOLIC BLOOD PRESSURE: 70 MMHG | WEIGHT: 157 LBS

## 2020-02-05 PROCEDURE — G8417 CALC BMI ABV UP PARAM F/U: HCPCS | Performed by: NURSE PRACTITIONER

## 2020-02-05 PROCEDURE — 99212 OFFICE O/P EST SF 10 MIN: CPT | Performed by: NURSE PRACTITIONER

## 2020-02-05 PROCEDURE — 4040F PNEUMOC VAC/ADMIN/RCVD: CPT | Performed by: NURSE PRACTITIONER

## 2020-02-05 PROCEDURE — 1123F ACP DISCUSS/DSCN MKR DOCD: CPT | Performed by: NURSE PRACTITIONER

## 2020-02-05 PROCEDURE — G8482 FLU IMMUNIZE ORDER/ADMIN: HCPCS | Performed by: NURSE PRACTITIONER

## 2020-02-05 PROCEDURE — G8427 DOCREV CUR MEDS BY ELIG CLIN: HCPCS | Performed by: NURSE PRACTITIONER

## 2020-02-05 PROCEDURE — 1036F TOBACCO NON-USER: CPT | Performed by: NURSE PRACTITIONER

## 2020-02-05 ASSESSMENT — ENCOUNTER SYMPTOMS
COUGH: 0
NAUSEA: 0
SORE THROAT: 0
DIARRHEA: 0
SINUS PRESSURE: 0
VOMITING: 0
FACIAL SWELLING: 0

## 2020-02-06 NOTE — PROGRESS NOTES
History   Problem Relation Age of Onset    Heart Disease Father     Heart Disease Sister      Social History     Socioeconomic History    Marital status:      Spouse name: Not on file    Number of children: Not on file    Years of education: Not on file    Highest education level: Not on file   Occupational History    Not on file   Social Needs    Financial resource strain: Not on file    Food insecurity:     Worry: Not on file     Inability: Not on file    Transportation needs:     Medical: Not on file     Non-medical: Not on file   Tobacco Use    Smoking status: Former Smoker     Packs/day: 1.00     Years: 10.00     Pack years: 10.00     Types: Cigarettes     Last attempt to quit: 3/19/1978     Years since quittin.9    Smokeless tobacco: Never Used    Tobacco comment: quit 25 years ago   Substance and Sexual Activity    Alcohol use: Yes     Comment: on occasion    Drug use: No    Sexual activity: Not on file   Lifestyle    Physical activity:     Days per week: Not on file     Minutes per session: Not on file    Stress: Not on file   Relationships    Social connections:     Talks on phone: Not on file     Gets together: Not on file     Attends Christianity service: Not on file     Active member of club or organization: Not on file     Attends meetings of clubs or organizations: Not on file     Relationship status: Not on file    Intimate partner violence:     Fear of current or ex partner: Not on file     Emotionally abused: Not on file     Physically abused: Not on file     Forced sexual activity: Not on file   Other Topics Concern    Not on file   Social History Narrative    Not on file       Review of Systems   Constitutional: Negative for appetite change, chills, fatigue, fever and unexpected weight change. HENT: Negative for congestion, ear discharge, ear pain, facial swelling, hearing loss, sinus pressure, sneezing and sore throat. Respiratory: Negative for cough. Cardiovascular: Negative for chest pain. Gastrointestinal: Negative for diarrhea, nausea and vomiting. Genitourinary: Negative for difficulty urinating, dysuria, hematuria and urgency. Musculoskeletal: Negative for arthralgias and gait problem. Skin: Positive for rash (resolved). Neurological: Negative for dizziness, weakness and headaches. Hematological: Negative for adenopathy. Psychiatric/Behavioral: Negative for sleep disturbance and suicidal ideas. Physical Exam  Constitutional:       Appearance: He is normal weight. HENT:      Head: Normocephalic. Right Ear: Tympanic membrane and ear canal normal.      Left Ear: Tympanic membrane and ear canal normal.      Ears:      Comments: B cerumen impaction     Nose: Nose normal.      Mouth/Throat:      Mouth: Mucous membranes are moist.      Pharynx: Oropharynx is clear. Eyes:      Conjunctiva/sclera: Conjunctivae normal.      Pupils: Pupils are equal, round, and reactive to light. Cardiovascular:      Rate and Rhythm: Normal rate and regular rhythm. Heart sounds: Normal heart sounds. Pulmonary:      Effort: Pulmonary effort is normal.      Breath sounds: Normal breath sounds. Lymphadenopathy:      Cervical: No cervical adenopathy. Neurological:      Mental Status: He is alert. Psychiatric:         Mood and Affect: Mood normal.         Thought Content: Thought content normal.         Judgment: Judgment normal.         Assessment:     1. Perioral dermatitis  Resolved    2. Thrush, oral  Resolved. Monitor symptoms, if tongue sensory changes do not resolve, consider further testing   Will discuss with Tyrone Goldman at f/u      Plan:    See above plan. No follow-ups on file.     Laurita Dykes, APRN - CNP

## 2020-02-11 ENCOUNTER — OFFICE VISIT (OUTPATIENT)
Dept: INTERNAL MEDICINE CLINIC | Age: 85
End: 2020-02-11
Payer: MEDICARE

## 2020-02-11 VITALS
HEART RATE: 128 BPM | DIASTOLIC BLOOD PRESSURE: 70 MMHG | OXYGEN SATURATION: 93 % | BODY MASS INDEX: 24.63 KG/M2 | WEIGHT: 148 LBS | SYSTOLIC BLOOD PRESSURE: 128 MMHG

## 2020-02-11 PROCEDURE — 1123F ACP DISCUSS/DSCN MKR DOCD: CPT | Performed by: NURSE PRACTITIONER

## 2020-02-11 PROCEDURE — 1036F TOBACCO NON-USER: CPT | Performed by: NURSE PRACTITIONER

## 2020-02-11 PROCEDURE — G8420 CALC BMI NORM PARAMETERS: HCPCS | Performed by: NURSE PRACTITIONER

## 2020-02-11 PROCEDURE — G8427 DOCREV CUR MEDS BY ELIG CLIN: HCPCS | Performed by: NURSE PRACTITIONER

## 2020-02-11 PROCEDURE — 99213 OFFICE O/P EST LOW 20 MIN: CPT | Performed by: NURSE PRACTITIONER

## 2020-02-11 PROCEDURE — 4040F PNEUMOC VAC/ADMIN/RCVD: CPT | Performed by: NURSE PRACTITIONER

## 2020-02-11 PROCEDURE — G8482 FLU IMMUNIZE ORDER/ADMIN: HCPCS | Performed by: NURSE PRACTITIONER

## 2020-02-11 ASSESSMENT — ENCOUNTER SYMPTOMS
WHEEZING: 0
COUGH: 0
SHORTNESS OF BREATH: 0
BLOOD IN STOOL: 0
DIARRHEA: 0
CONSTIPATION: 0
EYE DISCHARGE: 0
VOMITING: 0
NAUSEA: 0
ABDOMINAL PAIN: 0

## 2020-02-11 NOTE — PROGRESS NOTES
daily. No current facility-administered medications for this visit. Social History     Socioeconomic History    Marital status:       Spouse name: Not on file    Number of children: Not on file    Years of education: Not on file    Highest education level: Not on file   Occupational History    Not on file   Social Needs    Financial resource strain: Not on file    Food insecurity:     Worry: Not on file     Inability: Not on file    Transportation needs:     Medical: Not on file     Non-medical: Not on file   Tobacco Use    Smoking status: Former Smoker     Packs/day: 1.00     Years: 10.00     Pack years: 10.00     Types: Cigarettes     Last attempt to quit: 3/19/1978     Years since quittin.9    Smokeless tobacco: Never Used    Tobacco comment: quit 25 years ago   Substance and Sexual Activity    Alcohol use: Yes     Comment: on occasion    Drug use: No    Sexual activity: Not on file   Lifestyle    Physical activity:     Days per week: Not on file     Minutes per session: Not on file    Stress: Not on file   Relationships    Social connections:     Talks on phone: Not on file     Gets together: Not on file     Attends Rastafarian service: Not on file     Active member of club or organization: Not on file     Attends meetings of clubs or organizations: Not on file     Relationship status: Not on file    Intimate partner violence:     Fear of current or ex partner: Not on file     Emotionally abused: Not on file     Physically abused: Not on file     Forced sexual activity: Not on file   Other Topics Concern    Not on file   Social History Narrative    Not on file       Family History   Problem Relation Age of Onset    Heart Disease Father     Heart Disease Sister        Past Medical History:   Diagnosis Date    Back pain     Cancer ([facility] [address]     skin    Chronic kidney disease     Diabetes mellitus ([facility] [address]     Essential tremor     History of BPH     Hypercholesteremia  Hypertension     Renal insufficiency     Type 2 diabetes mellitus with hyperglycemia ([facility] 12/21/2017       Review of Systems   Constitutional: Negative for fever. HENT: Negative for congestion. Eyes: Negative for discharge. Respiratory: Negative for cough, shortness of breath and wheezing. Cardiovascular: Negative for chest pain, palpitations and leg swelling. Gastrointestinal: Negative for abdominal pain, blood in stool, constipation, diarrhea, nausea and vomiting. Genitourinary: Negative for dysuria and hematuria. Musculoskeletal: Negative for myalgias. Skin: Negative for rash. Neurological: Negative for dizziness. Psychiatric/Behavioral: The patient is not nervous/anxious. Physical Exam  Constitutional:       General: He is not in acute distress. Appearance: He is not diaphoretic. HENT:      Head:      Comments: Bilateral ear canals 1000% blocked with impacted cerumen     Right Ear: External ear normal.      Left Ear: External ear normal.      Mouth/Throat:      Pharynx: No oropharyngeal exudate. Eyes:      General: No scleral icterus. Right eye: No discharge. Left eye: No discharge. Neck:      Musculoskeletal: Normal range of motion. Thyroid: No thyromegaly. Cardiovascular:      Rate and Rhythm: Normal rate and regular rhythm. Heart sounds: Normal heart sounds. No murmur. No friction rub. No gallop. Pulmonary:      Effort: Pulmonary effort is normal.      Breath sounds: Normal breath sounds. No wheezing. Abdominal:      General: There is no distension. Tenderness: There is no abdominal tenderness. Musculoskeletal: Normal range of motion. General: No tenderness. Lymphadenopathy:      Cervical: No cervical adenopathy. Skin:     General: Skin is warm and dry. Findings: No erythema or rash. Neurological:      Mental Status: He is alert and oriented to person, place, and time.    Psychiatric:         Judgment:

## 2020-02-26 ENCOUNTER — OFFICE VISIT (OUTPATIENT)
Dept: INTERNAL MEDICINE CLINIC | Age: 85
End: 2020-02-26
Payer: MEDICARE

## 2020-02-26 VITALS
HEART RATE: 107 BPM | SYSTOLIC BLOOD PRESSURE: 110 MMHG | DIASTOLIC BLOOD PRESSURE: 66 MMHG | BODY MASS INDEX: 24.13 KG/M2 | OXYGEN SATURATION: 96 % | WEIGHT: 145 LBS

## 2020-02-26 PROCEDURE — 99213 OFFICE O/P EST LOW 20 MIN: CPT | Performed by: NURSE PRACTITIONER

## 2020-02-26 PROCEDURE — 1123F ACP DISCUSS/DSCN MKR DOCD: CPT | Performed by: NURSE PRACTITIONER

## 2020-02-26 PROCEDURE — G8427 DOCREV CUR MEDS BY ELIG CLIN: HCPCS | Performed by: NURSE PRACTITIONER

## 2020-02-26 PROCEDURE — G8420 CALC BMI NORM PARAMETERS: HCPCS | Performed by: NURSE PRACTITIONER

## 2020-02-26 PROCEDURE — 4040F PNEUMOC VAC/ADMIN/RCVD: CPT | Performed by: NURSE PRACTITIONER

## 2020-02-26 PROCEDURE — 1036F TOBACCO NON-USER: CPT | Performed by: NURSE PRACTITIONER

## 2020-02-26 PROCEDURE — G8482 FLU IMMUNIZE ORDER/ADMIN: HCPCS | Performed by: NURSE PRACTITIONER

## 2020-02-26 ASSESSMENT — ENCOUNTER SYMPTOMS
BLOOD IN STOOL: 0
CONSTIPATION: 0
WHEEZING: 0
DIARRHEA: 0
SHORTNESS OF BREATH: 0
ABDOMINAL PAIN: 0
COUGH: 0
EYE DISCHARGE: 0
NAUSEA: 0
VOMITING: 0

## 2020-03-19 ENCOUNTER — OFFICE VISIT (OUTPATIENT)
Dept: INTERNAL MEDICINE CLINIC | Age: 85
End: 2020-03-19
Payer: MEDICARE

## 2020-03-19 VITALS
BODY MASS INDEX: 24.13 KG/M2 | HEART RATE: 104 BPM | TEMPERATURE: 98.4 F | WEIGHT: 145 LBS | SYSTOLIC BLOOD PRESSURE: 122 MMHG | OXYGEN SATURATION: 94 % | DIASTOLIC BLOOD PRESSURE: 76 MMHG

## 2020-03-19 PROCEDURE — 99213 OFFICE O/P EST LOW 20 MIN: CPT | Performed by: NURSE PRACTITIONER

## 2020-03-19 PROCEDURE — 4040F PNEUMOC VAC/ADMIN/RCVD: CPT | Performed by: NURSE PRACTITIONER

## 2020-03-19 PROCEDURE — G8427 DOCREV CUR MEDS BY ELIG CLIN: HCPCS | Performed by: NURSE PRACTITIONER

## 2020-03-19 PROCEDURE — 1036F TOBACCO NON-USER: CPT | Performed by: NURSE PRACTITIONER

## 2020-03-19 PROCEDURE — 1123F ACP DISCUSS/DSCN MKR DOCD: CPT | Performed by: NURSE PRACTITIONER

## 2020-03-19 PROCEDURE — G8420 CALC BMI NORM PARAMETERS: HCPCS | Performed by: NURSE PRACTITIONER

## 2020-03-19 PROCEDURE — G8482 FLU IMMUNIZE ORDER/ADMIN: HCPCS | Performed by: NURSE PRACTITIONER

## 2020-03-19 ASSESSMENT — ENCOUNTER SYMPTOMS
NAUSEA: 0
DIARRHEA: 0
BLOOD IN STOOL: 0
WHEEZING: 0
ABDOMINAL PAIN: 0
EYE DISCHARGE: 0
COUGH: 0
VOMITING: 0
SHORTNESS OF BREATH: 0
CONSTIPATION: 0

## 2020-03-19 NOTE — PROGRESS NOTES
Eyes: Negative for discharge. Respiratory: Negative for cough, shortness of breath and wheezing. Cardiovascular: Negative for chest pain, palpitations and leg swelling. Gastrointestinal: Negative for abdominal pain, blood in stool, constipation, diarrhea, nausea and vomiting. Genitourinary: Negative for dysuria and hematuria. Musculoskeletal: Negative for myalgias. Skin: Negative for rash. Neurological: Negative for dizziness. Psychiatric/Behavioral: The patient is not nervous/anxious. Physical Exam  Constitutional:       General: He is not in acute distress. Appearance: He is not diaphoretic. HENT:      Right Ear: External ear normal.      Left Ear: External ear normal.      Mouth/Throat:      Pharynx: No oropharyngeal exudate. Eyes:      General: No scleral icterus. Right eye: No discharge. Left eye: No discharge. Neck:      Musculoskeletal: Normal range of motion. Thyroid: No thyromegaly. Cardiovascular:      Rate and Rhythm: Normal rate and regular rhythm. Heart sounds: Normal heart sounds. No murmur. No friction rub. No gallop. Pulmonary:      Effort: Pulmonary effort is normal.      Breath sounds: Normal breath sounds. No wheezing. Abdominal:      General: Bowel sounds are normal. There is no distension. Palpations: Abdomen is soft. Tenderness: There is no abdominal tenderness. Musculoskeletal: Normal range of motion. General: No tenderness. Lymphadenopathy:      Cervical: No cervical adenopathy. Skin:     General: Skin is warm and dry. Findings: No erythema or rash. Comments: Monofilament testing =bilaterally and WNL; +2 pulses, feet pink and warm. Good hygiene, skin integrity intact without sign of rash or edema. Neurological:      Mental Status: He is alert and oriented to person, place, and time.    Psychiatric:         Judgment: Judgment normal.       1. DM (diabetes mellitus), secondary, uncontrolled, w/neurologic complic ([facility] [address]  Increase toujeo to 16 units daily  Continue to follow healthy plate guidelines  Return in 4 weeks with food diary and glucose log for evaluation and possible medication titraion    2.  Chronic kidney disease, stage III (moderate) ([facility] [address]  monitor     ROXANA Harvey - CNP

## 2020-04-16 ENCOUNTER — VIRTUAL VISIT (OUTPATIENT)
Dept: INTERNAL MEDICINE CLINIC | Age: 85
End: 2020-04-16
Payer: MEDICARE

## 2020-04-16 VITALS — SYSTOLIC BLOOD PRESSURE: 130 MMHG | DIASTOLIC BLOOD PRESSURE: 70 MMHG | HEART RATE: 105 BPM

## 2020-04-16 PROCEDURE — 99442 PR PHYS/QHP TELEPHONE EVALUATION 11-20 MIN: CPT | Performed by: NURSE PRACTITIONER

## 2020-04-16 PROCEDURE — G8427 DOCREV CUR MEDS BY ELIG CLIN: HCPCS | Performed by: NURSE PRACTITIONER

## 2020-04-16 PROCEDURE — 1123F ACP DISCUSS/DSCN MKR DOCD: CPT | Performed by: NURSE PRACTITIONER

## 2020-04-16 PROCEDURE — 1036F TOBACCO NON-USER: CPT | Performed by: NURSE PRACTITIONER

## 2020-04-16 PROCEDURE — G8420 CALC BMI NORM PARAMETERS: HCPCS | Performed by: NURSE PRACTITIONER

## 2020-04-16 PROCEDURE — 4040F PNEUMOC VAC/ADMIN/RCVD: CPT | Performed by: NURSE PRACTITIONER

## 2020-04-16 NOTE — PROGRESS NOTES
Ramey Osler is a 81 y.o. male evaluated via telephone on 4/16/2020. Consent:  He and/or health care decision maker is aware that that he may receive a bill for this telephone service, depending on his insurance coverage, and has provided verbal consent to proceed: Yes      Documentation:  I communicated with the patient and/or health care decision maker about DM. Details of this discussion including any medical advice provided: DM, his fasting blood glucose has been 134-145 with an average of 140, his PP has been 144-198 with an average of 167 since increasing his TouJeo to 16 units. He reports no issues or concerns with taking this medication. He has been getting outside to walk every week. Continue taking TouJeo 16 units daily. Continue walking. Praised him for getting outside and walking. Continue with glucose log and food diary. I affirm this is a Patient Initiated Episode with an Established Patient who has not had a related appointment within my department in the past 7 days or scheduled within the next 24 hours.     Total Time: minutes: 11-20 minutes    Note: not billable if this call serves to triage the patient into an appointment for the relevant concern      Daphnie Hector

## 2020-06-17 ENCOUNTER — TELEPHONE (OUTPATIENT)
Dept: INTERNAL MEDICINE CLINIC | Age: 85
End: 2020-06-17

## 2020-06-18 ENCOUNTER — OFFICE VISIT (OUTPATIENT)
Dept: INTERNAL MEDICINE CLINIC | Age: 85
End: 2020-06-18
Payer: MEDICARE

## 2020-06-18 VITALS
HEIGHT: 65 IN | SYSTOLIC BLOOD PRESSURE: 116 MMHG | OXYGEN SATURATION: 96 % | DIASTOLIC BLOOD PRESSURE: 72 MMHG | WEIGHT: 143 LBS | HEART RATE: 81 BPM | TEMPERATURE: 97.7 F | BODY MASS INDEX: 23.82 KG/M2

## 2020-06-18 PROCEDURE — 1036F TOBACCO NON-USER: CPT | Performed by: NURSE PRACTITIONER

## 2020-06-18 PROCEDURE — G8420 CALC BMI NORM PARAMETERS: HCPCS | Performed by: NURSE PRACTITIONER

## 2020-06-18 PROCEDURE — 99214 OFFICE O/P EST MOD 30 MIN: CPT | Performed by: NURSE PRACTITIONER

## 2020-06-18 PROCEDURE — 4040F PNEUMOC VAC/ADMIN/RCVD: CPT | Performed by: NURSE PRACTITIONER

## 2020-06-18 PROCEDURE — 1123F ACP DISCUSS/DSCN MKR DOCD: CPT | Performed by: NURSE PRACTITIONER

## 2020-06-18 PROCEDURE — G8427 DOCREV CUR MEDS BY ELIG CLIN: HCPCS | Performed by: NURSE PRACTITIONER

## 2020-06-18 RX ORDER — ESCITALOPRAM OXALATE 10 MG/1
10 TABLET ORAL DAILY
Qty: 30 TABLET | Refills: 3 | Status: SHIPPED | OUTPATIENT
Start: 2020-06-18 | End: 2020-10-12

## 2020-06-18 ASSESSMENT — ENCOUNTER SYMPTOMS
NAUSEA: 0
EYE DISCHARGE: 0
SHORTNESS OF BREATH: 0
ABDOMINAL PAIN: 0
DIARRHEA: 0
COUGH: 0
VOMITING: 0
CONSTIPATION: 0
BLOOD IN STOOL: 0
WHEEZING: 0

## 2020-06-18 NOTE — PROGRESS NOTES
History of BPH     Hypercholesteremia     Hypertension     Renal insufficiency     Type 2 diabetes mellitus with hyperglycemia ([facility] [address] 12/21/2017       Review of Systems   Constitutional: Negative for fever. HENT: Negative for congestion. Eyes: Negative for discharge. Respiratory: Negative for cough, shortness of breath and wheezing. Cardiovascular: Negative for chest pain, palpitations and leg swelling. Gastrointestinal: Negative for abdominal pain, blood in stool, constipation, diarrhea, nausea and vomiting. Genitourinary: Negative for dysuria and hematuria. Musculoskeletal: Negative for myalgias. Skin: Negative for rash. Neurological: Negative for dizziness. Psychiatric/Behavioral: The patient is nervous/anxious. Struggling with restriction on interaction at place of residence and struggling with family members, ie. Wanting money, being disruptive, etc.       Physical Exam  Constitutional:       General: He is not in acute distress. Appearance: He is not diaphoretic. HENT:      Right Ear: External ear normal.      Left Ear: External ear normal.      Mouth/Throat:      Pharynx: No oropharyngeal exudate. Eyes:      General: No scleral icterus. Right eye: No discharge. Left eye: No discharge. Neck:      Musculoskeletal: Normal range of motion. Thyroid: No thyromegaly. Cardiovascular:      Rate and Rhythm: Normal rate and regular rhythm. Heart sounds: Normal heart sounds. No murmur. No friction rub. No gallop. Pulmonary:      Effort: Pulmonary effort is normal.      Breath sounds: Normal breath sounds. No wheezing. Abdominal:      General: Bowel sounds are normal. There is no distension. Palpations: Abdomen is soft. Tenderness: There is no abdominal tenderness. Musculoskeletal: Normal range of motion. General: No tenderness. Lymphadenopathy:      Cervical: No cervical adenopathy. Skin:     General: Skin is warm and dry.

## 2020-07-02 ENCOUNTER — VIRTUAL VISIT (OUTPATIENT)
Dept: INTERNAL MEDICINE CLINIC | Age: 85
End: 2020-07-02
Payer: MEDICARE

## 2020-07-02 PROCEDURE — 1036F TOBACCO NON-USER: CPT | Performed by: INTERNAL MEDICINE

## 2020-07-02 PROCEDURE — G8420 CALC BMI NORM PARAMETERS: HCPCS | Performed by: INTERNAL MEDICINE

## 2020-07-02 PROCEDURE — 99212 OFFICE O/P EST SF 10 MIN: CPT | Performed by: INTERNAL MEDICINE

## 2020-07-02 PROCEDURE — G8427 DOCREV CUR MEDS BY ELIG CLIN: HCPCS | Performed by: INTERNAL MEDICINE

## 2020-07-02 PROCEDURE — 1123F ACP DISCUSS/DSCN MKR DOCD: CPT | Performed by: INTERNAL MEDICINE

## 2020-07-02 PROCEDURE — 4040F PNEUMOC VAC/ADMIN/RCVD: CPT | Performed by: INTERNAL MEDICINE

## 2020-07-02 ASSESSMENT — PATIENT HEALTH QUESTIONNAIRE - PHQ9
SUM OF ALL RESPONSES TO PHQ9 QUESTIONS 1 & 2: 0
SUM OF ALL RESPONSES TO PHQ QUESTIONS 1-9: 0
SUM OF ALL RESPONSES TO PHQ QUESTIONS 1-9: 0
1. LITTLE INTEREST OR PLEASURE IN DOING THINGS: 0
2. FEELING DOWN, DEPRESSED OR HOPELESS: 0

## 2020-07-02 NOTE — PROGRESS NOTES
rales. He exhibits no tenderness. Abdominal: Soft. He exhibits no distension. There is no tenderness. There is no rebound and no guarding. No hepatosplenomegaly   Musculoskeletal: He exhibits no edema. Neurological: He is alert and oriented to person, place, and time. Skin: He is not diaphoretic. Psychiatric: He has a normal mood and affect. His behavior is normal. Judgment and thought content normal.   Vitals reviewed. Assessment:       Tremor  Stable. Continue to monitor    Chronic kidney disease, stage III (moderate) (HCC)  Stable. Continue to monitor. Follow-up with nephrology      Type 2 diabetes mellitus with hyperglycemia, unspecified whether long term insulin use (HCC)  Improved. Continue to monitor    Hyperlipidemia  Controlled. Plan:      See above plan              I affirm this is a Patient Initiated Episode with a Patient who has not had a related appointment within my department in the past 7 days or scheduled within the next 24 hours.     Patient identification was verified at the start of the visit: Yes    Total Time: minutes: 11-20 minutes    Note: not billable if this call serves to triage the patient into an appointment for the relevant concern      Jaspal Wells

## 2020-07-06 ENCOUNTER — HOSPITAL ENCOUNTER (OUTPATIENT)
Age: 85
Discharge: HOME | End: 2020-07-06
Payer: MEDICARE

## 2020-07-06 LAB
A/G RATIO: 2 (ref 1.1–2.2)
ALBUMIN SERPL-MCNC: 4.3 G/DL (ref 3.4–5)
ALP BLD-CCNC: 36 U/L (ref 40–129)
ALT SERPL-CCNC: 16 U/L (ref 10–40)
ANION GAP SERPL CALCULATED.3IONS-SCNC: 10 MMOL/L (ref 3–16)
AST SERPL-CCNC: 22 U/L (ref 15–37)
BILIRUB SERPL-MCNC: 0.7 MG/DL (ref 0–1)
BUN BLDV-MCNC: 31 MG/DL (ref 7–20)
CALCIUM SERPL-MCNC: 9.8 MG/DL (ref 8.3–10.6)
CHLORIDE BLD-SCNC: 105 MMOL/L (ref 99–110)
CHOLESTEROL, TOTAL: 101 MG/DL (ref 0–199)
CO2: 26 MMOL/L (ref 21–32)
CREAT SERPL-MCNC: 1.7 MG/DL (ref 0.8–1.3)
ESTIMATED AVERAGE GLUCOSE: 182.9 MG/DL
GFR AFRICAN AMERICAN: 47
GFR NON-AFRICAN AMERICAN: 39
GLOBULIN: 2.1 G/DL
GLUCOSE BLD-MCNC: 138 MG/DL (ref 70–99)
HBA1C MFR BLD: 8 %
HDLC SERPL-MCNC: 34 MG/DL (ref 40–60)
LDL CHOLESTEROL CALCULATED: 35 MG/DL
POTASSIUM SERPL-SCNC: 4.4 MMOL/L (ref 3.5–5.1)
SODIUM BLD-SCNC: 141 MMOL/L (ref 136–145)
TOTAL PROTEIN: 6.4 G/DL (ref 6.4–8.2)
TRIGL SERPL-MCNC: 161 MG/DL (ref 0–150)
VLDLC SERPL CALC-MCNC: 32 MG/DL

## 2020-07-06 PROCEDURE — 83036 HEMOGLOBIN GLYCOSYLATED A1C: CPT

## 2020-07-06 PROCEDURE — 36415 COLL VENOUS BLD VENIPUNCTURE: CPT

## 2020-07-06 PROCEDURE — 80053 COMPREHEN METABOLIC PANEL: CPT

## 2020-07-06 PROCEDURE — 80061 LIPID PANEL: CPT

## 2020-07-22 RX ORDER — ROSUVASTATIN CALCIUM 40 MG/1
TABLET, COATED ORAL
Qty: 90 TABLET | Refills: 2 | Status: SHIPPED | OUTPATIENT
Start: 2020-07-22 | End: 2021-01-01

## 2020-07-22 NOTE — TELEPHONE ENCOUNTER
Refill request for crestor medication.      [facility]    Last visit - 6-18-20     Pending visit - 9-22-20    Last refill -4-28-20    Medication Contract signed -   Abhay rao-     Additional Comments

## 2020-07-24 ENCOUNTER — HOSPITAL ENCOUNTER (OUTPATIENT)
Age: 85
Discharge: HOME | End: 2020-07-24
Payer: MEDICARE

## 2020-07-24 LAB
ALBUMIN SERPL-MCNC: 4.4 G/DL (ref 3.4–5)
ANION GAP SERPL CALCULATED.3IONS-SCNC: 16 MMOL/L (ref 3–16)
BUN BLDV-MCNC: 36 MG/DL (ref 7–20)
CALCIUM SERPL-MCNC: 10 MG/DL (ref 8.3–10.6)
CHLORIDE BLD-SCNC: 104 MMOL/L (ref 99–110)
CO2: 23 MMOL/L (ref 21–32)
CREAT SERPL-MCNC: 2 MG/DL (ref 0.8–1.3)
GFR AFRICAN AMERICAN: 39
GFR NON-AFRICAN AMERICAN: 32
GLUCOSE BLD-MCNC: 111 MG/DL (ref 70–99)
PHOSPHORUS: 3.5 MG/DL (ref 2.5–4.9)
POTASSIUM SERPL-SCNC: 4.2 MMOL/L (ref 3.5–5.1)
SODIUM BLD-SCNC: 143 MMOL/L (ref 136–145)
VITAMIN D 25-HYDROXY: 76.1 NG/ML

## 2020-07-24 PROCEDURE — 80069 RENAL FUNCTION PANEL: CPT

## 2020-07-24 PROCEDURE — 82306 VITAMIN D 25 HYDROXY: CPT

## 2020-07-24 PROCEDURE — 85025 COMPLETE CBC W/AUTO DIFF WBC: CPT

## 2020-07-24 PROCEDURE — 36415 COLL VENOUS BLD VENIPUNCTURE: CPT

## 2020-07-27 LAB
BASOPHILS ABSOLUTE: 1.3 K/UL (ref 0–0.2)
BASOPHILS RELATIVE PERCENT: 4 %
EOSINOPHILS ABSOLUTE: 3.3 K/UL (ref 0–0.6)
EOSINOPHILS RELATIVE PERCENT: 10 %
HCT VFR BLD CALC: 46.3 % (ref 40.5–52.5)
HEMATOLOGY PATH CONSULT: NO
HEMOGLOBIN: 15.2 G/DL (ref 13.5–17.5)
LYMPHOCYTES ABSOLUTE: 10.9 K/UL (ref 1–5.1)
LYMPHOCYTES RELATIVE PERCENT: 33 %
MCH RBC QN AUTO: 29.7 PG (ref 26–34)
MCHC RBC AUTO-ENTMCNC: 32.8 G/DL (ref 31–36)
MCV RBC AUTO: 90.3 FL (ref 80–100)
MONOCYTES ABSOLUTE: 4 K/UL (ref 0–1.3)
MONOCYTES RELATIVE PERCENT: 12 %
NEUTROPHILS ABSOLUTE: 13.6 K/UL (ref 1.7–7.7)
NEUTROPHILS RELATIVE PERCENT: 41 %
PDW BLD-RTO: 15.4 % (ref 12.4–15.4)
PLATELET # BLD: 102 K/UL (ref 135–450)
PLATELET SLIDE REVIEW: ABNORMAL
PMV BLD AUTO: 8.6 FL (ref 5–10.5)
RBC # BLD: 5.12 M/UL (ref 4.2–5.9)
SLIDE REVIEW: ABNORMAL
WBC # BLD: 33.1 K/UL (ref 4–11)

## 2020-09-22 ENCOUNTER — OFFICE VISIT (OUTPATIENT)
Dept: INTERNAL MEDICINE CLINIC | Age: 85
End: 2020-09-22
Payer: MEDICARE

## 2020-09-22 VITALS
SYSTOLIC BLOOD PRESSURE: 122 MMHG | OXYGEN SATURATION: 92 % | BODY MASS INDEX: 23.96 KG/M2 | HEART RATE: 90 BPM | TEMPERATURE: 98.1 F | WEIGHT: 144 LBS | DIASTOLIC BLOOD PRESSURE: 68 MMHG

## 2020-09-22 PROBLEM — N18.4 CHRONIC KIDNEY DISEASE (CKD), STAGE IV (SEVERE) (HCC): Status: ACTIVE | Noted: 2020-09-22

## 2020-09-22 LAB — HBA1C MFR BLD: 7 %

## 2020-09-22 PROCEDURE — G0008 ADMIN INFLUENZA VIRUS VAC: HCPCS | Performed by: NURSE PRACTITIONER

## 2020-09-22 PROCEDURE — G8420 CALC BMI NORM PARAMETERS: HCPCS | Performed by: NURSE PRACTITIONER

## 2020-09-22 PROCEDURE — 99214 OFFICE O/P EST MOD 30 MIN: CPT | Performed by: NURSE PRACTITIONER

## 2020-09-22 PROCEDURE — G8427 DOCREV CUR MEDS BY ELIG CLIN: HCPCS | Performed by: NURSE PRACTITIONER

## 2020-09-22 PROCEDURE — 3051F HG A1C>EQUAL 7.0%<8.0%: CPT | Performed by: NURSE PRACTITIONER

## 2020-09-22 PROCEDURE — 4040F PNEUMOC VAC/ADMIN/RCVD: CPT | Performed by: NURSE PRACTITIONER

## 2020-09-22 PROCEDURE — 1123F ACP DISCUSS/DSCN MKR DOCD: CPT | Performed by: NURSE PRACTITIONER

## 2020-09-22 PROCEDURE — 90694 VACC AIIV4 NO PRSRV 0.5ML IM: CPT | Performed by: NURSE PRACTITIONER

## 2020-09-22 PROCEDURE — 1036F TOBACCO NON-USER: CPT | Performed by: NURSE PRACTITIONER

## 2020-09-22 ASSESSMENT — ENCOUNTER SYMPTOMS
COUGH: 0
ABDOMINAL PAIN: 0
WHEEZING: 0
DIARRHEA: 0
SHORTNESS OF BREATH: 0
EYE DISCHARGE: 0
NAUSEA: 0
CONSTIPATION: 0
VOMITING: 0
BLOOD IN STOOL: 0

## 2020-09-22 NOTE — PROGRESS NOTES
Eyes: Negative for discharge. Respiratory: Negative for cough, shortness of breath and wheezing. Cardiovascular: Negative for chest pain, palpitations and leg swelling. Gastrointestinal: Negative for abdominal pain, blood in stool, constipation, diarrhea, nausea and vomiting. Genitourinary: Negative for dysuria and hematuria. Musculoskeletal: Negative for myalgias. Skin: Negative for rash. Neurological: Negative for dizziness. Psychiatric/Behavioral: The patient is not nervous/anxious. Physical Exam  Constitutional:       General: He is not in acute distress. Appearance: He is not diaphoretic. HENT:      Right Ear: External ear normal.      Left Ear: External ear normal.      Mouth/Throat:      Pharynx: No oropharyngeal exudate. Eyes:      General: No scleral icterus. Right eye: No discharge. Left eye: No discharge. Neck:      Musculoskeletal: Normal range of motion. Thyroid: No thyromegaly. Cardiovascular:      Rate and Rhythm: Normal rate and regular rhythm. Heart sounds: Normal heart sounds. No murmur. No friction rub. No gallop. Pulmonary:      Effort: Pulmonary effort is normal.      Breath sounds: Normal breath sounds. No wheezing. Abdominal:      General: Bowel sounds are normal. There is no distension. Palpations: Abdomen is soft. Tenderness: There is no abdominal tenderness. Musculoskeletal: Normal range of motion. General: No tenderness. Lymphadenopathy:      Cervical: No cervical adenopathy. Skin:     General: Skin is warm and dry. Findings: No erythema or rash. Comments: Monofilament testing =bilaterally and WNL; +2 pulses, feet pink and warm. Good hygiene, skin integrity intact without sign of rash or edema. Toe nails very thick, bony changes noted in feet. Callous formation and dry skin bilaterally. Neurological:      Mental Status: He is alert and oriented to person, place, and time. Psychiatric:         Judgment: Judgment normal.      1. DM (diabetes mellitus), secondary, uncontrolled, w/neurologic complic [facility])  Praised him for the great progress in his health  Continue current meds  Take advantage of available podiatry services to assist with feet care  - POCT glycosylated hemoglobin (Hb A1C)  -  DIABETES FOOT EXAM    2. Chronic kidney disease (CKD), stage IV (severe) ([facility] [address]  FU with nephrology    3. Immunization due  .  Flu shot today  - INFLUENZA, QUADV, ADJUVANTED, 65 YRS =, IM, PF, PREFILL SYR, 0.5ML (FLUAD)     Return in Jan to see Dr. Jose Corral as scheduled  Return to see me in June/July for AMW  Return earlier for any needed help with DM  ROXANA Thornton - CNP

## 2020-10-12 RX ORDER — ESCITALOPRAM OXALATE 10 MG/1
TABLET ORAL
Qty: 30 TABLET | Refills: 2 | Status: SHIPPED | OUTPATIENT
Start: 2020-10-12 | End: 2021-01-01

## 2020-10-12 NOTE — TELEPHONE ENCOUNTER
Refill request for lexapro medication.      Name of [facility]- [facility]      Last visit - 9-22-20     Pending visit - 1-7-21    Last refill -9-14-20      Medication Contract signed -   Abhay rao-         Additional Comments

## 2021-01-01 ENCOUNTER — ANESTHESIA (OUTPATIENT)
Dept: OPERATING ROOM | Age: 86
DRG: 661 | End: 2021-01-01
Payer: MEDICARE

## 2021-01-01 ENCOUNTER — OFFICE VISIT (OUTPATIENT)
Dept: PRIMARY CARE CLINIC | Age: 86
End: 2021-01-01
Payer: MEDICARE

## 2021-01-01 ENCOUNTER — CARE COORDINATION (OUTPATIENT)
Dept: CASE MANAGEMENT | Age: 86
End: 2021-01-01

## 2021-01-01 ENCOUNTER — HOSPITAL ENCOUNTER (OUTPATIENT)
Dept: CT IMAGING | Age: 86
Discharge: HOME | End: 2021-04-28
Payer: MEDICARE

## 2021-01-01 ENCOUNTER — HOSPITAL ENCOUNTER (OUTPATIENT)
Age: 86
Discharge: HOME | End: 2021-09-17
Payer: MEDICARE

## 2021-01-01 ENCOUNTER — TELEPHONE (OUTPATIENT)
Dept: INTERNAL MEDICINE CLINIC | Age: 86
End: 2021-01-01

## 2021-01-01 ENCOUNTER — APPOINTMENT (OUTPATIENT)
Dept: CT IMAGING | Age: 86
End: 2021-01-01
Payer: MEDICARE

## 2021-01-01 ENCOUNTER — HOSPITAL ENCOUNTER (INPATIENT)
Age: 86
LOS: 2 days | Discharge: HOME | DRG: 661 | End: 2021-06-03
Attending: UROLOGY | Admitting: UROLOGY
Payer: MEDICARE

## 2021-01-01 ENCOUNTER — OFFICE VISIT (OUTPATIENT)
Dept: INTERNAL MEDICINE CLINIC | Age: 86
End: 2021-01-01
Payer: MEDICARE

## 2021-01-01 ENCOUNTER — APPOINTMENT (OUTPATIENT)
Dept: GENERAL RADIOLOGY | Age: 86
DRG: 177 | End: 2021-01-01
Attending: INTERNAL MEDICINE
Payer: MEDICARE

## 2021-01-01 ENCOUNTER — HOSPITAL ENCOUNTER (OUTPATIENT)
Age: 86
Discharge: HOME | End: 2021-04-28
Payer: MEDICARE

## 2021-01-01 ENCOUNTER — HOSPITAL ENCOUNTER (INPATIENT)
Age: 86
LOS: 16 days | Discharge: HOSPICE | DRG: 177 | End: 2021-10-26
Attending: EMERGENCY MEDICINE | Admitting: FAMILY MEDICINE
Payer: MEDICARE

## 2021-01-01 ENCOUNTER — APPOINTMENT (OUTPATIENT)
Dept: INTERVENTIONAL RADIOLOGY/VASCULAR | Age: 86
DRG: 661 | End: 2021-01-01
Attending: UROLOGY
Payer: MEDICARE

## 2021-01-01 ENCOUNTER — HOSPITAL ENCOUNTER (OUTPATIENT)
Age: 86
Discharge: HOME | End: 2021-02-01
Payer: MEDICARE

## 2021-01-01 ENCOUNTER — HOSPITAL ENCOUNTER (INPATIENT)
Age: 86
LOS: 1 days | DRG: 177 | End: 2021-10-27
Attending: INTERNAL MEDICINE | Admitting: INTERNAL MEDICINE
Payer: MEDICARE

## 2021-01-01 ENCOUNTER — APPOINTMENT (OUTPATIENT)
Dept: GENERAL RADIOLOGY | Age: 86
End: 2021-01-01
Payer: MEDICARE

## 2021-01-01 ENCOUNTER — ANESTHESIA EVENT (OUTPATIENT)
Dept: OPERATING ROOM | Age: 86
DRG: 661 | End: 2021-01-01
Payer: MEDICARE

## 2021-01-01 ENCOUNTER — HOSPITAL ENCOUNTER (OUTPATIENT)
Dept: ULTRASOUND IMAGING | Age: 86
Discharge: HOME | End: 2021-03-29
Payer: MEDICARE

## 2021-01-01 ENCOUNTER — HOSPITAL ENCOUNTER (OUTPATIENT)
Age: 86
Discharge: HOME | End: 2021-05-07
Payer: MEDICARE

## 2021-01-01 ENCOUNTER — HOSPITAL ENCOUNTER (EMERGENCY)
Age: 86
Discharge: HOME | End: 2021-10-07
Attending: EMERGENCY MEDICINE
Payer: MEDICARE

## 2021-01-01 ENCOUNTER — HOSPITAL ENCOUNTER (OUTPATIENT)
Age: 86
Discharge: HOME | End: 2021-07-28
Payer: MEDICARE

## 2021-01-01 ENCOUNTER — APPOINTMENT (OUTPATIENT)
Dept: GENERAL RADIOLOGY | Age: 86
DRG: 661 | End: 2021-01-01
Attending: UROLOGY
Payer: MEDICARE

## 2021-01-01 ENCOUNTER — HOSPITAL ENCOUNTER (OUTPATIENT)
Age: 86
Discharge: HOME | End: 2021-01-07
Payer: MEDICARE

## 2021-01-01 ENCOUNTER — APPOINTMENT (OUTPATIENT)
Dept: VASCULAR LAB | Age: 86
DRG: 177 | End: 2021-01-01
Attending: INTERNAL MEDICINE
Payer: MEDICARE

## 2021-01-01 VITALS
OXYGEN SATURATION: 59 % | DIASTOLIC BLOOD PRESSURE: 48 MMHG | RESPIRATION RATE: 18 BRPM | HEART RATE: 110 BPM | SYSTOLIC BLOOD PRESSURE: 72 MMHG | TEMPERATURE: 98.2 F

## 2021-01-01 VITALS — OXYGEN SATURATION: 100 % | DIASTOLIC BLOOD PRESSURE: 84 MMHG | SYSTOLIC BLOOD PRESSURE: 152 MMHG

## 2021-01-01 VITALS
WEIGHT: 146 LBS | BODY MASS INDEX: 23.21 KG/M2 | OXYGEN SATURATION: 90 % | TEMPERATURE: 98.2 F | HEART RATE: 68 BPM | SYSTOLIC BLOOD PRESSURE: 110 MMHG | DIASTOLIC BLOOD PRESSURE: 62 MMHG

## 2021-01-01 VITALS
OXYGEN SATURATION: 94 % | HEIGHT: 67 IN | WEIGHT: 143 LBS | HEART RATE: 92 BPM | BODY MASS INDEX: 22.44 KG/M2 | SYSTOLIC BLOOD PRESSURE: 118 MMHG | TEMPERATURE: 97.4 F | DIASTOLIC BLOOD PRESSURE: 64 MMHG

## 2021-01-01 VITALS
DIASTOLIC BLOOD PRESSURE: 65 MMHG | BODY MASS INDEX: 23.3 KG/M2 | TEMPERATURE: 98.5 F | RESPIRATION RATE: 21 BRPM | HEART RATE: 80 BPM | OXYGEN SATURATION: 94 % | WEIGHT: 145 LBS | HEIGHT: 66 IN | SYSTOLIC BLOOD PRESSURE: 140 MMHG

## 2021-01-01 VITALS
WEIGHT: 143 LBS | BODY MASS INDEX: 23.8 KG/M2 | SYSTOLIC BLOOD PRESSURE: 118 MMHG | DIASTOLIC BLOOD PRESSURE: 76 MMHG | TEMPERATURE: 97.5 F

## 2021-01-01 VITALS
BODY MASS INDEX: 24.13 KG/M2 | SYSTOLIC BLOOD PRESSURE: 132 MMHG | OXYGEN SATURATION: 99 % | TEMPERATURE: 97.5 F | HEART RATE: 72 BPM | WEIGHT: 145 LBS | DIASTOLIC BLOOD PRESSURE: 74 MMHG

## 2021-01-01 VITALS
TEMPERATURE: 98 F | OXYGEN SATURATION: 96 % | HEART RATE: 71 BPM | DIASTOLIC BLOOD PRESSURE: 68 MMHG | SYSTOLIC BLOOD PRESSURE: 125 MMHG | HEIGHT: 64 IN | BODY MASS INDEX: 23.56 KG/M2 | WEIGHT: 138 LBS | RESPIRATION RATE: 16 BRPM

## 2021-01-01 VITALS
WEIGHT: 122.8 LBS | TEMPERATURE: 97.1 F | HEIGHT: 68 IN | OXYGEN SATURATION: 91 % | SYSTOLIC BLOOD PRESSURE: 109 MMHG | BODY MASS INDEX: 18.61 KG/M2 | DIASTOLIC BLOOD PRESSURE: 58 MMHG | RESPIRATION RATE: 16 BRPM | HEART RATE: 88 BPM

## 2021-01-01 DIAGNOSIS — N18.4 CHRONIC KIDNEY DISEASE (CKD), STAGE IV (SEVERE) (HCC): ICD-10-CM

## 2021-01-01 DIAGNOSIS — R09.81 SINUS CONGESTION: Primary | ICD-10-CM

## 2021-01-01 DIAGNOSIS — I10 ESSENTIAL HYPERTENSION: ICD-10-CM

## 2021-01-01 DIAGNOSIS — R09.02 HYPOXIA: ICD-10-CM

## 2021-01-01 DIAGNOSIS — Z01.818 PREOP TESTING: Primary | ICD-10-CM

## 2021-01-01 DIAGNOSIS — E78.2 MIXED HYPERLIPIDEMIA: Primary | ICD-10-CM

## 2021-01-01 DIAGNOSIS — D72.820 LYMPHOCYTOSIS: ICD-10-CM

## 2021-01-01 DIAGNOSIS — N18.4 CHRONIC KIDNEY DISEASE, STAGE IV (SEVERE) (HCC): ICD-10-CM

## 2021-01-01 DIAGNOSIS — U07.1 COVID-19: ICD-10-CM

## 2021-01-01 DIAGNOSIS — D69.6 THROMBOCYTOPENIA (HCC): ICD-10-CM

## 2021-01-01 DIAGNOSIS — N17.9 AKI (ACUTE KIDNEY INJURY) (HCC): ICD-10-CM

## 2021-01-01 DIAGNOSIS — N28.9 RENAL INSUFFICIENCY: ICD-10-CM

## 2021-01-01 DIAGNOSIS — E11.65 TYPE 2 DIABETES MELLITUS WITH HYPERGLYCEMIA, UNSPECIFIED WHETHER LONG TERM INSULIN USE (HCC): ICD-10-CM

## 2021-01-01 DIAGNOSIS — E78.2 MIXED HYPERLIPIDEMIA: ICD-10-CM

## 2021-01-01 DIAGNOSIS — R53.1 GENERALIZED WEAKNESS: Primary | ICD-10-CM

## 2021-01-01 DIAGNOSIS — Z00.00 ROUTINE GENERAL MEDICAL EXAMINATION AT A HEALTH CARE FACILITY: Primary | ICD-10-CM

## 2021-01-01 DIAGNOSIS — R79.89 ELEVATED LFTS: ICD-10-CM

## 2021-01-01 DIAGNOSIS — N13.2 HYDRONEPHROSIS WITH RENAL AND URETERAL CALCULUS OBSTRUCTION: ICD-10-CM

## 2021-01-01 DIAGNOSIS — Z01.818 PREOP TESTING: ICD-10-CM

## 2021-01-01 DIAGNOSIS — R10.9 ABDOMINAL PAIN, UNSPECIFIED ABDOMINAL LOCATION: ICD-10-CM

## 2021-01-01 DIAGNOSIS — D72.829 LEUKOCYTOSIS, UNSPECIFIED TYPE: ICD-10-CM

## 2021-01-01 DIAGNOSIS — N20.0 LEFT NEPHROLITHIASIS: Primary | ICD-10-CM

## 2021-01-01 DIAGNOSIS — R31.29 OTHER MICROSCOPIC HEMATURIA: ICD-10-CM

## 2021-01-01 DIAGNOSIS — R05.9 COUGH: ICD-10-CM

## 2021-01-01 DIAGNOSIS — D72.820 LYMPHOCYTOSIS: Primary | ICD-10-CM

## 2021-01-01 DIAGNOSIS — E87.1 HYPONATREMIA: ICD-10-CM

## 2021-01-01 DIAGNOSIS — R79.89 ELEVATED D-DIMER: ICD-10-CM

## 2021-01-01 DIAGNOSIS — N18.4 CHRONIC KIDNEY DISEASE (CKD), STAGE IV (SEVERE) (HCC): Primary | ICD-10-CM

## 2021-01-01 DIAGNOSIS — N20.0 RENAL CALCULI: Primary | ICD-10-CM

## 2021-01-01 LAB
A/G RATIO: 0.6 (ref 1.1–2.2)
A/G RATIO: 0.8 (ref 1.1–2.2)
A/G RATIO: 0.9 (ref 1.1–2.2)
A/G RATIO: 1 (ref 1.1–2.2)
A/G RATIO: 1 (ref 1.1–2.2)
A/G RATIO: 1.1 (ref 1.1–2.2)
A/G RATIO: 1.2 (ref 1.1–2.2)
A/G RATIO: 1.4 (ref 1.1–2.2)
A/G RATIO: 1.5 (ref 1.1–2.2)
A/G RATIO: 2 (ref 1.1–2.2)
A/G RATIO: 2.2 (ref 1.1–2.2)
ABO/RH: NORMAL
ALBUMIN SERPL-MCNC: 2.2 G/DL (ref 3.4–5)
ALBUMIN SERPL-MCNC: 3.1 G/DL (ref 3.4–5)
ALBUMIN SERPL-MCNC: 3.1 G/DL (ref 3.4–5)
ALBUMIN SERPL-MCNC: 3.2 G/DL (ref 3.4–5)
ALBUMIN SERPL-MCNC: 3.3 G/DL (ref 3.4–5)
ALBUMIN SERPL-MCNC: 3.3 G/DL (ref 3.4–5)
ALBUMIN SERPL-MCNC: 3.4 G/DL (ref 3.4–5)
ALBUMIN SERPL-MCNC: 3.6 G/DL (ref 3.4–5)
ALBUMIN SERPL-MCNC: 3.7 G/DL (ref 3.4–5)
ALBUMIN SERPL-MCNC: 3.8 G/DL (ref 3.4–5)
ALBUMIN SERPL-MCNC: 4.2 G/DL (ref 3.4–5)
ALBUMIN SERPL-MCNC: 4.4 G/DL (ref 3.4–5)
ALBUMIN SERPL-MCNC: 4.5 G/DL (ref 3.4–5)
ALBUMIN SERPL-MCNC: 4.5 G/DL (ref 3.4–5)
ALBUMIN SERPL-MCNC: 4.6 G/DL (ref 3.4–5)
ALP BLD-CCNC: 40 U/L (ref 40–129)
ALP BLD-CCNC: 44 U/L (ref 40–129)
ALP BLD-CCNC: 51 U/L (ref 40–129)
ALP BLD-CCNC: 79 U/L (ref 40–129)
ALP BLD-CCNC: 82 U/L (ref 40–129)
ALP BLD-CCNC: 88 U/L (ref 40–129)
ALP BLD-CCNC: 89 U/L (ref 40–129)
ALP BLD-CCNC: 89 U/L (ref 40–129)
ALP BLD-CCNC: 93 U/L (ref 40–129)
ALP BLD-CCNC: 93 U/L (ref 40–129)
ALP BLD-CCNC: 95 U/L (ref 40–129)
ALP BLD-CCNC: 95 U/L (ref 40–129)
ALP BLD-CCNC: 98 U/L (ref 40–129)
ALP BLD-CCNC: 99 U/L (ref 40–129)
ALT SERPL-CCNC: 16 U/L (ref 10–40)
ALT SERPL-CCNC: 18 U/L (ref 10–40)
ALT SERPL-CCNC: 33 U/L (ref 10–40)
ALT SERPL-CCNC: 34 U/L (ref 10–40)
ALT SERPL-CCNC: 34 U/L (ref 10–40)
ALT SERPL-CCNC: 35 U/L (ref 10–40)
ALT SERPL-CCNC: 35 U/L (ref 10–40)
ALT SERPL-CCNC: 36 U/L (ref 10–40)
ALT SERPL-CCNC: 37 U/L (ref 10–40)
ALT SERPL-CCNC: 38 U/L (ref 10–40)
ALT SERPL-CCNC: 38 U/L (ref 10–40)
ALT SERPL-CCNC: 42 U/L (ref 10–40)
ALT SERPL-CCNC: 44 U/L (ref 10–40)
ALT SERPL-CCNC: 51 U/L (ref 10–40)
AMORPHOUS: NORMAL /HPF
ANION GAP SERPL CALCULATED.3IONS-SCNC: 10 MMOL/L (ref 3–16)
ANION GAP SERPL CALCULATED.3IONS-SCNC: 12 MMOL/L (ref 3–16)
ANION GAP SERPL CALCULATED.3IONS-SCNC: 13 MMOL/L (ref 3–16)
ANION GAP SERPL CALCULATED.3IONS-SCNC: 14 MMOL/L (ref 3–16)
ANION GAP SERPL CALCULATED.3IONS-SCNC: 15 MMOL/L (ref 3–16)
ANION GAP SERPL CALCULATED.3IONS-SCNC: 16 MMOL/L (ref 3–16)
ANISOCYTOSIS: ABNORMAL
ANTIBODY SCREEN: NORMAL
APTT: 131.7 SEC (ref 26.2–38.6)
APTT: 29 SEC (ref 26.2–38.6)
APTT: 54.8 SEC (ref 26.2–38.6)
APTT: 90.2 SEC (ref 26.2–38.6)
AST SERPL-CCNC: 24 U/L (ref 15–37)
AST SERPL-CCNC: 26 U/L (ref 15–37)
AST SERPL-CCNC: 32 U/L (ref 15–37)
AST SERPL-CCNC: 33 U/L (ref 15–37)
AST SERPL-CCNC: 37 U/L (ref 15–37)
AST SERPL-CCNC: 38 U/L (ref 15–37)
AST SERPL-CCNC: 39 U/L (ref 15–37)
AST SERPL-CCNC: 41 U/L (ref 15–37)
AST SERPL-CCNC: 41 U/L (ref 15–37)
AST SERPL-CCNC: 43 U/L (ref 15–37)
AST SERPL-CCNC: 46 U/L (ref 15–37)
AST SERPL-CCNC: 50 U/L (ref 15–37)
AST SERPL-CCNC: 58 U/L (ref 15–37)
AST SERPL-CCNC: 99 U/L (ref 15–37)
ATYPICAL LYMPHOCYTE RELATIVE PERCENT: 15 % (ref 0–6)
ATYPICAL LYMPHOCYTE RELATIVE PERCENT: 2 % (ref 0–6)
ATYPICAL LYMPHOCYTE RELATIVE PERCENT: 26 % (ref 0–6)
ATYPICAL LYMPHOCYTE RELATIVE PERCENT: 32 % (ref 0–6)
ATYPICAL LYMPHOCYTE RELATIVE PERCENT: 6 % (ref 0–6)
ATYPICAL LYMPHOCYTE RELATIVE PERCENT: 9 % (ref 0–6)
BACTERIA: ABNORMAL /HPF
BANDED NEUTROPHILS RELATIVE PERCENT: 1 % (ref 0–7)
BANDED NEUTROPHILS RELATIVE PERCENT: 1 % (ref 0–7)
BANDED NEUTROPHILS RELATIVE PERCENT: 10 % (ref 0–7)
BANDED NEUTROPHILS RELATIVE PERCENT: 2 % (ref 0–7)
BANDED NEUTROPHILS RELATIVE PERCENT: 3 % (ref 0–7)
BANDED NEUTROPHILS RELATIVE PERCENT: 3 % (ref 0–7)
BANDED NEUTROPHILS RELATIVE PERCENT: 4 % (ref 0–7)
BANDED NEUTROPHILS RELATIVE PERCENT: 7 % (ref 0–7)
BASOPHILS ABSOLUTE: 0 K/UL (ref 0–0.2)
BASOPHILS RELATIVE PERCENT: 0 %
BASOPHILS RELATIVE PERCENT: 0.1 %
BILIRUB SERPL-MCNC: 0.6 MG/DL (ref 0–1)
BILIRUB SERPL-MCNC: 0.7 MG/DL (ref 0–1)
BILIRUB SERPL-MCNC: 0.7 MG/DL (ref 0–1)
BILIRUB SERPL-MCNC: 0.8 MG/DL (ref 0–1)
BILIRUB SERPL-MCNC: 0.8 MG/DL (ref 0–1)
BILIRUB SERPL-MCNC: 0.9 MG/DL (ref 0–1)
BILIRUB SERPL-MCNC: 1 MG/DL (ref 0–1)
BILIRUB SERPL-MCNC: 1.1 MG/DL (ref 0–1)
BILIRUB SERPL-MCNC: 1.2 MG/DL (ref 0–1)
BILIRUB SERPL-MCNC: 1.5 MG/DL (ref 0–1)
BILIRUB SERPL-MCNC: 1.6 MG/DL (ref 0–1)
BILIRUBIN URINE: NEGATIVE
BLOOD, URINE: ABNORMAL
BUN BLDV-MCNC: 27 MG/DL (ref 7–20)
BUN BLDV-MCNC: 29 MG/DL (ref 7–20)
BUN BLDV-MCNC: 29 MG/DL (ref 7–20)
BUN BLDV-MCNC: 31 MG/DL (ref 7–20)
BUN BLDV-MCNC: 32 MG/DL (ref 7–20)
BUN BLDV-MCNC: 33 MG/DL (ref 7–20)
BUN BLDV-MCNC: 38 MG/DL (ref 7–20)
BUN BLDV-MCNC: 42 MG/DL (ref 7–20)
BUN BLDV-MCNC: 43 MG/DL (ref 7–20)
BUN BLDV-MCNC: 48 MG/DL (ref 7–20)
BUN BLDV-MCNC: 60 MG/DL (ref 7–20)
BUN BLDV-MCNC: 61 MG/DL (ref 7–20)
BUN BLDV-MCNC: 66 MG/DL (ref 7–20)
BUN BLDV-MCNC: 68 MG/DL (ref 7–20)
BUN BLDV-MCNC: 70 MG/DL (ref 7–20)
BUN BLDV-MCNC: 71 MG/DL (ref 7–20)
BUN BLDV-MCNC: 75 MG/DL (ref 7–20)
BUN BLDV-MCNC: 83 MG/DL (ref 7–20)
BUN BLDV-MCNC: 83 MG/DL (ref 7–20)
BUN BLDV-MCNC: 85 MG/DL (ref 7–20)
C-REACTIVE PROTEIN: 18.4 MG/L (ref 0–5.1)
C-REACTIVE PROTEIN: 26.2 MG/L (ref 0–5.1)
C-REACTIVE PROTEIN: 27.9 MG/L (ref 0–5.1)
C-REACTIVE PROTEIN: 51.7 MG/L (ref 0–5.1)
C-REACTIVE PROTEIN: 57.8 MG/L (ref 0–5.1)
CALCIUM SERPL-MCNC: 10.1 MG/DL (ref 8.3–10.6)
CALCIUM SERPL-MCNC: 10.3 MG/DL (ref 8.3–10.6)
CALCIUM SERPL-MCNC: 10.4 MG/DL (ref 8.3–10.6)
CALCIUM SERPL-MCNC: 7.6 MG/DL (ref 8.3–10.6)
CALCIUM SERPL-MCNC: 8.6 MG/DL (ref 8.3–10.6)
CALCIUM SERPL-MCNC: 8.7 MG/DL (ref 8.3–10.6)
CALCIUM SERPL-MCNC: 8.8 MG/DL (ref 8.3–10.6)
CALCIUM SERPL-MCNC: 8.9 MG/DL (ref 8.3–10.6)
CALCIUM SERPL-MCNC: 9 MG/DL (ref 8.3–10.6)
CALCIUM SERPL-MCNC: 9 MG/DL (ref 8.3–10.6)
CALCIUM SERPL-MCNC: 9.1 MG/DL (ref 8.3–10.6)
CALCIUM SERPL-MCNC: 9.1 MG/DL (ref 8.3–10.6)
CALCIUM SERPL-MCNC: 9.2 MG/DL (ref 8.3–10.6)
CALCIUM SERPL-MCNC: 9.3 MG/DL (ref 8.3–10.6)
CALCIUM SERPL-MCNC: 9.3 MG/DL (ref 8.3–10.6)
CALCIUM SERPL-MCNC: 9.5 MG/DL (ref 8.3–10.6)
CALCIUM SERPL-MCNC: 9.6 MG/DL (ref 8.3–10.6)
CALCIUM SERPL-MCNC: 9.6 MG/DL (ref 8.3–10.6)
CALCULI COMPOSITION: NORMAL
CHLORIDE BLD-SCNC: 102 MMOL/L (ref 99–110)
CHLORIDE BLD-SCNC: 103 MMOL/L (ref 99–110)
CHLORIDE BLD-SCNC: 103 MMOL/L (ref 99–110)
CHLORIDE BLD-SCNC: 104 MMOL/L (ref 99–110)
CHLORIDE BLD-SCNC: 105 MMOL/L (ref 99–110)
CHLORIDE BLD-SCNC: 106 MMOL/L (ref 99–110)
CHLORIDE BLD-SCNC: 107 MMOL/L (ref 99–110)
CHLORIDE BLD-SCNC: 108 MMOL/L (ref 99–110)
CHLORIDE BLD-SCNC: 110 MMOL/L (ref 99–110)
CHLORIDE BLD-SCNC: 97 MMOL/L (ref 99–110)
CHLORIDE BLD-SCNC: 99 MMOL/L (ref 99–110)
CHOLESTEROL, TOTAL: 107 MG/DL (ref 0–199)
CHOLESTEROL, TOTAL: 113 MG/DL (ref 0–199)
CLARITY: CLEAR
CO2: 16 MMOL/L (ref 21–32)
CO2: 17 MMOL/L (ref 21–32)
CO2: 18 MMOL/L (ref 21–32)
CO2: 20 MMOL/L (ref 21–32)
CO2: 21 MMOL/L (ref 21–32)
CO2: 22 MMOL/L (ref 21–32)
CO2: 23 MMOL/L (ref 21–32)
CO2: 24 MMOL/L (ref 21–32)
CO2: 24 MMOL/L (ref 21–32)
CO2: 25 MMOL/L (ref 21–32)
COARSE CASTS, UA: NORMAL /LPF (ref 0–2)
COLOR: YELLOW
CREAT SERPL-MCNC: 1.8 MG/DL (ref 0.8–1.3)
CREAT SERPL-MCNC: 1.9 MG/DL (ref 0.8–1.3)
CREAT SERPL-MCNC: 2 MG/DL (ref 0.8–1.3)
CREAT SERPL-MCNC: 2.1 MG/DL (ref 0.8–1.3)
CREAT SERPL-MCNC: 2.2 MG/DL (ref 0.8–1.3)
CREAT SERPL-MCNC: 2.3 MG/DL (ref 0.8–1.3)
CREAT SERPL-MCNC: 2.4 MG/DL (ref 0.8–1.3)
CREAT SERPL-MCNC: 2.5 MG/DL (ref 0.8–1.3)
CREAT SERPL-MCNC: 2.5 MG/DL (ref 0.8–1.3)
CREAT SERPL-MCNC: 2.6 MG/DL (ref 0.8–1.3)
CREAT SERPL-MCNC: 2.6 MG/DL (ref 0.8–1.3)
CREAT SERPL-MCNC: 2.7 MG/DL (ref 0.8–1.3)
CREAT SERPL-MCNC: 2.7 MG/DL (ref 0.8–1.3)
CREAT SERPL-MCNC: 2.8 MG/DL (ref 0.8–1.3)
CREAT SERPL-MCNC: 3 MG/DL (ref 0.8–1.3)
CREATININE URINE: 70.7 MG/DL (ref 39–259)
CREATININE URINE: <4.2 MG/DL (ref 39–259)
CRYSTALS, UA: ABNORMAL /HPF
D DIMER: 1015 NG/ML DDU (ref 0–229)
D DIMER: 1039 NG/ML DDU (ref 0–229)
D DIMER: 1166 NG/ML DDU (ref 0–229)
D DIMER: 1647 NG/ML DDU (ref 0–229)
D DIMER: 1699 NG/ML DDU (ref 0–229)
D DIMER: 1887 NG/ML DDU (ref 0–229)
D DIMER: 2051 NG/ML DDU (ref 0–229)
D DIMER: 2098 NG/ML DDU (ref 0–229)
D DIMER: 2283 NG/ML DDU (ref 0–229)
D DIMER: 2626 NG/ML DDU (ref 0–229)
D DIMER: 2766 NG/ML DDU (ref 0–229)
D DIMER: 359 NG/ML DDU (ref 0–229)
D DIMER: 386 NG/ML DDU (ref 0–229)
D DIMER: 429 NG/ML DDU (ref 0–229)
D DIMER: 496 NG/ML DDU (ref 0–229)
D DIMER: 518 NG/ML DDU (ref 0–229)
D DIMER: 640 NG/ML DDU (ref 0–229)
EKG ATRIAL RATE: 150 BPM
EKG ATRIAL RATE: 91 BPM
EKG ATRIAL RATE: 96 BPM
EKG DIAGNOSIS: NORMAL
EKG P AXIS: 22 DEGREES
EKG P AXIS: 29 DEGREES
EKG P-R INTERVAL: 174 MS
EKG P-R INTERVAL: 198 MS
EKG Q-T INTERVAL: 362 MS
EKG Q-T INTERVAL: 402 MS
EKG Q-T INTERVAL: 422 MS
EKG QRS DURATION: 154 MS
EKG QRS DURATION: 162 MS
EKG QRS DURATION: 170 MS
EKG QTC CALCULATION (BAZETT): 507 MS
EKG QTC CALCULATION (BAZETT): 519 MS
EKG QTC CALCULATION (BAZETT): 587 MS
EKG R AXIS: -62 DEGREES
EKG R AXIS: -69 DEGREES
EKG R AXIS: 261 DEGREES
EKG T AXIS: -1 DEGREES
EKG T AXIS: 26 DEGREES
EKG T AXIS: 4 DEGREES
EKG VENTRICULAR RATE: 158 BPM
EKG VENTRICULAR RATE: 91 BPM
EKG VENTRICULAR RATE: 96 BPM
EOSINOPHILS ABSOLUTE: 0 K/UL (ref 0–0.6)
EOSINOPHILS ABSOLUTE: 0.4 K/UL (ref 0–0.6)
EOSINOPHILS RELATIVE PERCENT: 0 %
EOSINOPHILS RELATIVE PERCENT: 1 %
EPITHELIAL CELLS, UA: ABNORMAL /HPF (ref 0–5)
EPITHELIAL CELLS, UA: NORMAL /HPF (ref 0–5)
ESTIMATED AVERAGE GLUCOSE: 157.1 MG/DL
ESTIMATED AVERAGE GLUCOSE: 168.6 MG/DL
ESTIMATED AVERAGE GLUCOSE: 194.4 MG/DL
FERRITIN: 622.5 NG/ML (ref 30–400)
GFR AFRICAN AMERICAN: 24
GFR AFRICAN AMERICAN: 26
GFR AFRICAN AMERICAN: 27
GFR AFRICAN AMERICAN: 27
GFR AFRICAN AMERICAN: 29
GFR AFRICAN AMERICAN: 29
GFR AFRICAN AMERICAN: 30
GFR AFRICAN AMERICAN: 30
GFR AFRICAN AMERICAN: 31
GFR AFRICAN AMERICAN: 33
GFR AFRICAN AMERICAN: 35
GFR AFRICAN AMERICAN: 37
GFR AFRICAN AMERICAN: 39
GFR AFRICAN AMERICAN: 41
GFR AFRICAN AMERICAN: 44
GFR NON-AFRICAN AMERICAN: 20
GFR NON-AFRICAN AMERICAN: 22
GFR NON-AFRICAN AMERICAN: 23
GFR NON-AFRICAN AMERICAN: 23
GFR NON-AFRICAN AMERICAN: 24
GFR NON-AFRICAN AMERICAN: 24
GFR NON-AFRICAN AMERICAN: 25
GFR NON-AFRICAN AMERICAN: 25
GFR NON-AFRICAN AMERICAN: 26
GFR NON-AFRICAN AMERICAN: 27
GFR NON-AFRICAN AMERICAN: 29
GFR NON-AFRICAN AMERICAN: 30
GFR NON-AFRICAN AMERICAN: 32
GFR NON-AFRICAN AMERICAN: 34
GFR NON-AFRICAN AMERICAN: 36
GLOBULIN: 2.1 G/DL
GLOBULIN: 2.3 G/DL
GLOBULIN: 2.5 G/DL
GLOBULIN: 2.7 G/DL
GLOBULIN: 2.9 G/DL
GLOBULIN: 3.1 G/DL
GLOBULIN: 3.2 G/DL
GLOBULIN: 3.3 G/DL
GLOBULIN: 3.4 G/DL
GLOBULIN: 3.4 G/DL
GLOBULIN: 3.5 G/DL
GLOBULIN: 3.6 G/DL
GLOBULIN: 3.7 G/DL
GLOBULIN: 3.9 G/DL
GLUCOSE BLD-MCNC: 103 MG/DL (ref 70–99)
GLUCOSE BLD-MCNC: 103 MG/DL (ref 70–99)
GLUCOSE BLD-MCNC: 117 MG/DL (ref 70–99)
GLUCOSE BLD-MCNC: 124 MG/DL (ref 70–99)
GLUCOSE BLD-MCNC: 126 MG/DL (ref 70–99)
GLUCOSE BLD-MCNC: 126 MG/DL (ref 70–99)
GLUCOSE BLD-MCNC: 127 MG/DL (ref 70–99)
GLUCOSE BLD-MCNC: 131 MG/DL (ref 70–99)
GLUCOSE BLD-MCNC: 134 MG/DL (ref 70–99)
GLUCOSE BLD-MCNC: 135 MG/DL (ref 70–99)
GLUCOSE BLD-MCNC: 138 MG/DL (ref 70–99)
GLUCOSE BLD-MCNC: 138 MG/DL (ref 70–99)
GLUCOSE BLD-MCNC: 141 MG/DL (ref 70–99)
GLUCOSE BLD-MCNC: 145 MG/DL (ref 70–99)
GLUCOSE BLD-MCNC: 146 MG/DL (ref 70–99)
GLUCOSE BLD-MCNC: 151 MG/DL (ref 70–99)
GLUCOSE BLD-MCNC: 153 MG/DL (ref 70–99)
GLUCOSE BLD-MCNC: 154 MG/DL (ref 70–99)
GLUCOSE BLD-MCNC: 157 MG/DL (ref 70–99)
GLUCOSE BLD-MCNC: 157 MG/DL (ref 70–99)
GLUCOSE BLD-MCNC: 159 MG/DL (ref 70–99)
GLUCOSE BLD-MCNC: 164 MG/DL (ref 70–99)
GLUCOSE BLD-MCNC: 164 MG/DL (ref 70–99)
GLUCOSE BLD-MCNC: 168 MG/DL (ref 70–99)
GLUCOSE BLD-MCNC: 170 MG/DL (ref 70–99)
GLUCOSE BLD-MCNC: 170 MG/DL (ref 70–99)
GLUCOSE BLD-MCNC: 172 MG/DL (ref 70–99)
GLUCOSE BLD-MCNC: 173 MG/DL (ref 70–99)
GLUCOSE BLD-MCNC: 178 MG/DL (ref 70–99)
GLUCOSE BLD-MCNC: 184 MG/DL (ref 70–99)
GLUCOSE BLD-MCNC: 184 MG/DL (ref 70–99)
GLUCOSE BLD-MCNC: 190 MG/DL (ref 70–99)
GLUCOSE BLD-MCNC: 190 MG/DL (ref 70–99)
GLUCOSE BLD-MCNC: 193 MG/DL (ref 70–99)
GLUCOSE BLD-MCNC: 194 MG/DL (ref 70–99)
GLUCOSE BLD-MCNC: 194 MG/DL (ref 70–99)
GLUCOSE BLD-MCNC: 195 MG/DL (ref 70–99)
GLUCOSE BLD-MCNC: 195 MG/DL (ref 70–99)
GLUCOSE BLD-MCNC: 197 MG/DL (ref 70–99)
GLUCOSE BLD-MCNC: 197 MG/DL (ref 70–99)
GLUCOSE BLD-MCNC: 201 MG/DL (ref 70–99)
GLUCOSE BLD-MCNC: 202 MG/DL (ref 70–99)
GLUCOSE BLD-MCNC: 202 MG/DL (ref 70–99)
GLUCOSE BLD-MCNC: 209 MG/DL (ref 70–99)
GLUCOSE BLD-MCNC: 210 MG/DL (ref 70–99)
GLUCOSE BLD-MCNC: 211 MG/DL (ref 70–99)
GLUCOSE BLD-MCNC: 217 MG/DL (ref 70–99)
GLUCOSE BLD-MCNC: 220 MG/DL (ref 70–99)
GLUCOSE BLD-MCNC: 220 MG/DL (ref 70–99)
GLUCOSE BLD-MCNC: 222 MG/DL (ref 70–99)
GLUCOSE BLD-MCNC: 225 MG/DL (ref 70–99)
GLUCOSE BLD-MCNC: 225 MG/DL (ref 70–99)
GLUCOSE BLD-MCNC: 228 MG/DL (ref 70–99)
GLUCOSE BLD-MCNC: 232 MG/DL (ref 70–99)
GLUCOSE BLD-MCNC: 232 MG/DL (ref 70–99)
GLUCOSE BLD-MCNC: 235 MG/DL (ref 70–99)
GLUCOSE BLD-MCNC: 236 MG/DL (ref 70–99)
GLUCOSE BLD-MCNC: 239 MG/DL (ref 70–99)
GLUCOSE BLD-MCNC: 239 MG/DL (ref 70–99)
GLUCOSE BLD-MCNC: 243 MG/DL (ref 70–99)
GLUCOSE BLD-MCNC: 250 MG/DL (ref 70–99)
GLUCOSE BLD-MCNC: 252 MG/DL (ref 70–99)
GLUCOSE BLD-MCNC: 252 MG/DL (ref 70–99)
GLUCOSE BLD-MCNC: 254 MG/DL (ref 70–99)
GLUCOSE BLD-MCNC: 260 MG/DL (ref 70–99)
GLUCOSE BLD-MCNC: 260 MG/DL (ref 70–99)
GLUCOSE BLD-MCNC: 262 MG/DL (ref 70–99)
GLUCOSE BLD-MCNC: 263 MG/DL (ref 70–99)
GLUCOSE BLD-MCNC: 264 MG/DL (ref 70–99)
GLUCOSE BLD-MCNC: 264 MG/DL (ref 70–99)
GLUCOSE BLD-MCNC: 267 MG/DL (ref 70–99)
GLUCOSE BLD-MCNC: 270 MG/DL (ref 70–99)
GLUCOSE BLD-MCNC: 272 MG/DL (ref 70–99)
GLUCOSE BLD-MCNC: 272 MG/DL (ref 70–99)
GLUCOSE BLD-MCNC: 275 MG/DL (ref 70–99)
GLUCOSE BLD-MCNC: 279 MG/DL (ref 70–99)
GLUCOSE BLD-MCNC: 281 MG/DL (ref 70–99)
GLUCOSE BLD-MCNC: 290 MG/DL (ref 70–99)
GLUCOSE BLD-MCNC: 290 MG/DL (ref 70–99)
GLUCOSE BLD-MCNC: 291 MG/DL (ref 70–99)
GLUCOSE BLD-MCNC: 292 MG/DL (ref 70–99)
GLUCOSE BLD-MCNC: 293 MG/DL (ref 70–99)
GLUCOSE BLD-MCNC: 294 MG/DL (ref 70–99)
GLUCOSE BLD-MCNC: 299 MG/DL (ref 70–99)
GLUCOSE BLD-MCNC: 300 MG/DL (ref 70–99)
GLUCOSE BLD-MCNC: 301 MG/DL (ref 70–99)
GLUCOSE BLD-MCNC: 310 MG/DL (ref 70–99)
GLUCOSE BLD-MCNC: 318 MG/DL (ref 70–99)
GLUCOSE BLD-MCNC: 325 MG/DL (ref 70–99)
GLUCOSE BLD-MCNC: 359 MG/DL (ref 70–99)
GLUCOSE BLD-MCNC: 365 MG/DL (ref 70–99)
GLUCOSE BLD-MCNC: 368 MG/DL (ref 70–99)
GLUCOSE BLD-MCNC: 70 MG/DL (ref 70–99)
GLUCOSE BLD-MCNC: 82 MG/DL (ref 70–99)
GLUCOSE BLD-MCNC: 96 MG/DL (ref 70–99)
GLUCOSE BLD-MCNC: 97 MG/DL (ref 70–99)
GLUCOSE URINE: 100 MG/DL
GLUCOSE URINE: NEGATIVE MG/DL
GLUCOSE URINE: NEGATIVE MG/DL
HBA1C MFR BLD: 7.1 %
HBA1C MFR BLD: 7.5 %
HBA1C MFR BLD: 8.4 %
HCT VFR BLD CALC: 35.1 % (ref 40.5–52.5)
HCT VFR BLD CALC: 36.6 % (ref 40.5–52.5)
HCT VFR BLD CALC: 36.7 % (ref 40.5–52.5)
HCT VFR BLD CALC: 37.4 % (ref 40.5–52.5)
HCT VFR BLD CALC: 38.4 % (ref 40.5–52.5)
HCT VFR BLD CALC: 39.2 % (ref 40.5–52.5)
HCT VFR BLD CALC: 39.5 % (ref 40.5–52.5)
HCT VFR BLD CALC: 39.7 % (ref 40.5–52.5)
HCT VFR BLD CALC: 39.8 % (ref 40.5–52.5)
HCT VFR BLD CALC: 40.6 % (ref 40.5–52.5)
HCT VFR BLD CALC: 40.7 % (ref 40.5–52.5)
HCT VFR BLD CALC: 40.9 % (ref 40.5–52.5)
HCT VFR BLD CALC: 41.3 % (ref 40.5–52.5)
HCT VFR BLD CALC: 41.3 % (ref 40.5–52.5)
HCT VFR BLD CALC: 42 % (ref 40.5–52.5)
HCT VFR BLD CALC: 42.1 % (ref 40.5–52.5)
HCT VFR BLD CALC: 42.7 % (ref 40.5–52.5)
HCT VFR BLD CALC: 43.9 % (ref 40.5–52.5)
HCT VFR BLD CALC: 44.1 % (ref 40.5–52.5)
HCT VFR BLD CALC: 45 % (ref 40.5–52.5)
HDLC SERPL-MCNC: 32 MG/DL (ref 40–60)
HDLC SERPL-MCNC: 37 MG/DL (ref 40–60)
HEMATOLOGY PATH CONSULT: NO
HEMATOLOGY PATH CONSULT: NORMAL
HEMATOLOGY PATH CONSULT: YES
HEMOGLOBIN: 11.7 G/DL (ref 13.5–17.5)
HEMOGLOBIN: 12 G/DL (ref 13.5–17.5)
HEMOGLOBIN: 12.1 G/DL (ref 13.5–17.5)
HEMOGLOBIN: 12.4 G/DL (ref 13.5–17.5)
HEMOGLOBIN: 12.4 G/DL (ref 13.5–17.5)
HEMOGLOBIN: 12.7 G/DL (ref 13.5–17.5)
HEMOGLOBIN: 12.7 G/DL (ref 13.5–17.5)
HEMOGLOBIN: 12.9 G/DL (ref 13.5–17.5)
HEMOGLOBIN: 13 G/DL (ref 13.5–17.5)
HEMOGLOBIN: 13.2 G/DL (ref 13.5–17.5)
HEMOGLOBIN: 13.2 G/DL (ref 13.5–17.5)
HEMOGLOBIN: 13.3 G/DL (ref 13.5–17.5)
HEMOGLOBIN: 13.4 G/DL (ref 13.5–17.5)
HEMOGLOBIN: 13.5 G/DL (ref 13.5–17.5)
HEMOGLOBIN: 13.6 G/DL (ref 13.5–17.5)
HEMOGLOBIN: 13.8 G/DL (ref 13.5–17.5)
HEMOGLOBIN: 14 G/DL (ref 13.5–17.5)
HEMOGLOBIN: 14 G/DL (ref 13.5–17.5)
HEMOGLOBIN: 14.3 G/DL (ref 13.5–17.5)
HEMOGLOBIN: 14.6 G/DL (ref 13.5–17.5)
HYPOCHROMIA: ABNORMAL
IGA: 72 MG/DL (ref 70–400)
IGG: 515 MG/DL (ref 700–1600)
IGM: 12 MG/DL (ref 40–230)
INR BLD: 1.12 (ref 0.86–1.14)
INR BLD: 1.38 (ref 0.88–1.12)
KETONES, URINE: ABNORMAL MG/DL
KETONES, URINE: NEGATIVE MG/DL
KETONES, URINE: NEGATIVE MG/DL
LACTIC ACID: 1.8 MMOL/L (ref 0.4–2)
LDL CHOLESTEROL CALCULATED: 43 MG/DL
LDL CHOLESTEROL CALCULATED: 43 MG/DL
LEUKOCYTE ESTERASE, URINE: ABNORMAL
LEUKOCYTE ESTERASE, URINE: NEGATIVE
LEUKOCYTE ESTERASE, URINE: NEGATIVE
LV EF: 58 %
LVEF MODALITY: NORMAL
LYMPHOCYTES ABSOLUTE: 11.4 K/UL (ref 1–5.1)
LYMPHOCYTES ABSOLUTE: 15.7 K/UL (ref 1–5.1)
LYMPHOCYTES ABSOLUTE: 19 K/UL (ref 1–5.1)
LYMPHOCYTES ABSOLUTE: 19.7 K/UL (ref 1–5.1)
LYMPHOCYTES ABSOLUTE: 20.1 K/UL (ref 1–5.1)
LYMPHOCYTES ABSOLUTE: 20.2 K/UL (ref 1–5.1)
LYMPHOCYTES ABSOLUTE: 21.6 K/UL (ref 1–5.1)
LYMPHOCYTES ABSOLUTE: 25.7 K/UL (ref 1–5.1)
LYMPHOCYTES ABSOLUTE: 25.8 K/UL (ref 1–5.1)
LYMPHOCYTES ABSOLUTE: 25.8 K/UL (ref 1–5.1)
LYMPHOCYTES ABSOLUTE: 30.3 K/UL (ref 1–5.1)
LYMPHOCYTES ABSOLUTE: 30.9 K/UL (ref 1–5.1)
LYMPHOCYTES ABSOLUTE: 31.7 K/UL (ref 1–5.1)
LYMPHOCYTES ABSOLUTE: 37.5 K/UL (ref 1–5.1)
LYMPHOCYTES ABSOLUTE: 39.2 K/UL (ref 1–5.1)
LYMPHOCYTES ABSOLUTE: 43.9 K/UL (ref 1–5.1)
LYMPHOCYTES ABSOLUTE: 8.7 K/UL (ref 1–5.1)
LYMPHOCYTES RELATIVE PERCENT: 48 %
LYMPHOCYTES RELATIVE PERCENT: 51 %
LYMPHOCYTES RELATIVE PERCENT: 56 %
LYMPHOCYTES RELATIVE PERCENT: 56 %
LYMPHOCYTES RELATIVE PERCENT: 58 %
LYMPHOCYTES RELATIVE PERCENT: 59 %
LYMPHOCYTES RELATIVE PERCENT: 59.5 %
LYMPHOCYTES RELATIVE PERCENT: 60 %
LYMPHOCYTES RELATIVE PERCENT: 62 %
LYMPHOCYTES RELATIVE PERCENT: 63 %
LYMPHOCYTES RELATIVE PERCENT: 65 %
LYMPHOCYTES RELATIVE PERCENT: 70 %
LYMPHOCYTES RELATIVE PERCENT: 72 %
LYMPHOCYTES RELATIVE PERCENT: 72 %
LYMPHOCYTES RELATIVE PERCENT: 82 %
LYMPHOCYTES RELATIVE PERCENT: 88 %
LYMPHOCYTES RELATIVE PERCENT: 93 %
MACROCYTES: ABNORMAL
MASS: 774 MG
MCH RBC QN AUTO: 28.2 PG (ref 26–34)
MCH RBC QN AUTO: 28.3 PG (ref 26–34)
MCH RBC QN AUTO: 28.5 PG (ref 26–34)
MCH RBC QN AUTO: 28.5 PG (ref 26–34)
MCH RBC QN AUTO: 28.6 PG (ref 26–34)
MCH RBC QN AUTO: 28.7 PG (ref 26–34)
MCH RBC QN AUTO: 28.8 PG (ref 26–34)
MCH RBC QN AUTO: 29 PG (ref 26–34)
MCH RBC QN AUTO: 29.1 PG (ref 26–34)
MCH RBC QN AUTO: 29.1 PG (ref 26–34)
MCH RBC QN AUTO: 29.4 PG (ref 26–34)
MCH RBC QN AUTO: 29.4 PG (ref 26–34)
MCH RBC QN AUTO: 29.7 PG (ref 26–34)
MCHC RBC AUTO-ENTMCNC: 31.7 G/DL (ref 31–36)
MCHC RBC AUTO-ENTMCNC: 32.1 G/DL (ref 31–36)
MCHC RBC AUTO-ENTMCNC: 32.3 G/DL (ref 31–36)
MCHC RBC AUTO-ENTMCNC: 32.3 G/DL (ref 31–36)
MCHC RBC AUTO-ENTMCNC: 32.4 G/DL (ref 31–36)
MCHC RBC AUTO-ENTMCNC: 32.5 G/DL (ref 31–36)
MCHC RBC AUTO-ENTMCNC: 32.6 G/DL (ref 31–36)
MCHC RBC AUTO-ENTMCNC: 32.8 G/DL (ref 31–36)
MCHC RBC AUTO-ENTMCNC: 32.9 G/DL (ref 31–36)
MCHC RBC AUTO-ENTMCNC: 33 G/DL (ref 31–36)
MCHC RBC AUTO-ENTMCNC: 33.1 G/DL (ref 31–36)
MCHC RBC AUTO-ENTMCNC: 33.2 G/DL (ref 31–36)
MCHC RBC AUTO-ENTMCNC: 33.4 G/DL (ref 31–36)
MCV RBC AUTO: 86.2 FL (ref 80–100)
MCV RBC AUTO: 86.4 FL (ref 80–100)
MCV RBC AUTO: 86.5 FL (ref 80–100)
MCV RBC AUTO: 86.8 FL (ref 80–100)
MCV RBC AUTO: 87 FL (ref 80–100)
MCV RBC AUTO: 87.5 FL (ref 80–100)
MCV RBC AUTO: 87.9 FL (ref 80–100)
MCV RBC AUTO: 88.1 FL (ref 80–100)
MCV RBC AUTO: 88.2 FL (ref 80–100)
MCV RBC AUTO: 88.6 FL (ref 80–100)
MCV RBC AUTO: 89.7 FL (ref 80–100)
MCV RBC AUTO: 90.1 FL (ref 80–100)
MCV RBC AUTO: 90.6 FL (ref 80–100)
MCV RBC AUTO: 90.7 FL (ref 80–100)
MCV RBC AUTO: 91.8 FL (ref 80–100)
MCV RBC AUTO: 92 FL (ref 80–100)
MICROCYTES: ABNORMAL
MICROSCOPIC EXAMINATION: YES
MONOCYTES ABSOLUTE: 0 K/UL (ref 0–1.3)
MONOCYTES ABSOLUTE: 0.3 K/UL (ref 0–1.3)
MONOCYTES ABSOLUTE: 0.5 K/UL (ref 0–1.3)
MONOCYTES ABSOLUTE: 0.7 K/UL (ref 0–1.3)
MONOCYTES ABSOLUTE: 0.8 K/UL (ref 0–1.3)
MONOCYTES ABSOLUTE: 1.4 K/UL (ref 0–1.3)
MONOCYTES ABSOLUTE: 1.6 K/UL (ref 0–1.3)
MONOCYTES ABSOLUTE: 2.2 K/UL (ref 0–1.3)
MONOCYTES RELATIVE PERCENT: 0 %
MONOCYTES RELATIVE PERCENT: 1 %
MONOCYTES RELATIVE PERCENT: 1.4 %
MONOCYTES RELATIVE PERCENT: 1.4 %
MONOCYTES RELATIVE PERCENT: 2 %
MONOCYTES RELATIVE PERCENT: 2 %
MONOCYTES RELATIVE PERCENT: 3 %
MONOCYTES RELATIVE PERCENT: 4 %
MONOCYTES RELATIVE PERCENT: 5 %
MUCUS: ABNORMAL /LPF
NEUTROPHILS ABSOLUTE: 10 K/UL (ref 1.7–7.7)
NEUTROPHILS ABSOLUTE: 10.2 K/UL (ref 1.7–7.7)
NEUTROPHILS ABSOLUTE: 10.6 K/UL (ref 1.7–7.7)
NEUTROPHILS ABSOLUTE: 11.5 K/UL (ref 1.7–7.7)
NEUTROPHILS ABSOLUTE: 11.7 K/UL (ref 1.7–7.7)
NEUTROPHILS ABSOLUTE: 12.4 K/UL (ref 1.7–7.7)
NEUTROPHILS ABSOLUTE: 14.2 K/UL (ref 1.7–7.7)
NEUTROPHILS ABSOLUTE: 14.3 K/UL (ref 1.7–7.7)
NEUTROPHILS ABSOLUTE: 2.2 K/UL (ref 1.7–7.7)
NEUTROPHILS ABSOLUTE: 2.3 K/UL (ref 1.7–7.7)
NEUTROPHILS ABSOLUTE: 3.3 K/UL (ref 1.7–7.7)
NEUTROPHILS ABSOLUTE: 6.2 K/UL (ref 1.7–7.7)
NEUTROPHILS ABSOLUTE: 6.3 K/UL (ref 1.7–7.7)
NEUTROPHILS ABSOLUTE: 6.7 K/UL (ref 1.7–7.7)
NEUTROPHILS ABSOLUTE: 8 K/UL (ref 1.7–7.7)
NEUTROPHILS ABSOLUTE: 9.3 K/UL (ref 1.7–7.7)
NEUTROPHILS ABSOLUTE: 9.5 K/UL (ref 1.7–7.7)
NEUTROPHILS RELATIVE PERCENT: 13 %
NEUTROPHILS RELATIVE PERCENT: 16 %
NEUTROPHILS RELATIVE PERCENT: 16 %
NEUTROPHILS RELATIVE PERCENT: 17 %
NEUTROPHILS RELATIVE PERCENT: 26 %
NEUTROPHILS RELATIVE PERCENT: 28 %
NEUTROPHILS RELATIVE PERCENT: 28 %
NEUTROPHILS RELATIVE PERCENT: 36 %
NEUTROPHILS RELATIVE PERCENT: 37 %
NEUTROPHILS RELATIVE PERCENT: 38 %
NEUTROPHILS RELATIVE PERCENT: 38 %
NEUTROPHILS RELATIVE PERCENT: 39 %
NEUTROPHILS RELATIVE PERCENT: 45 %
NEUTROPHILS RELATIVE PERCENT: 47.6 %
NEUTROPHILS RELATIVE PERCENT: 5 %
NEUTROPHILS RELATIVE PERCENT: 5 %
NEUTROPHILS RELATIVE PERCENT: 7 %
NITRITE, URINE: NEGATIVE
OVALOCYTES: ABNORMAL
PDW BLD-RTO: 15.6 % (ref 12.4–15.4)
PDW BLD-RTO: 15.8 % (ref 12.4–15.4)
PDW BLD-RTO: 15.9 % (ref 12.4–15.4)
PDW BLD-RTO: 16 % (ref 12.4–15.4)
PDW BLD-RTO: 16.2 % (ref 12.4–15.4)
PDW BLD-RTO: 16.3 % (ref 12.4–15.4)
PDW BLD-RTO: 16.5 % (ref 12.4–15.4)
PDW BLD-RTO: 16.6 % (ref 12.4–15.4)
PDW BLD-RTO: 16.6 % (ref 12.4–15.4)
PDW BLD-RTO: 16.7 % (ref 12.4–15.4)
PDW BLD-RTO: 16.7 % (ref 12.4–15.4)
PDW BLD-RTO: 16.9 % (ref 12.4–15.4)
PDW BLD-RTO: 17 % (ref 12.4–15.4)
PDW BLD-RTO: 17 % (ref 12.4–15.4)
PDW BLD-RTO: 17.1 % (ref 12.4–15.4)
PDW BLD-RTO: 17.2 % (ref 12.4–15.4)
PDW BLD-RTO: 17.3 % (ref 12.4–15.4)
PDW BLD-RTO: 17.3 % (ref 12.4–15.4)
PERFORMED ON: ABNORMAL
PERFORMED ON: NORMAL
PH UA: 5.5 (ref 5–8)
PHOSPHORUS: 3 MG/DL (ref 2.5–4.9)
PHOSPHORUS: 3.2 MG/DL (ref 2.5–4.9)
PHOSPHORUS: 3.3 MG/DL (ref 2.5–4.9)
PHOSPHORUS: 5 MG/DL (ref 2.5–4.9)
PLATELET # BLD: 104 K/UL (ref 135–450)
PLATELET # BLD: 108 K/UL (ref 135–450)
PLATELET # BLD: 114 K/UL (ref 135–450)
PLATELET # BLD: 118 K/UL (ref 135–450)
PLATELET # BLD: 177 K/UL (ref 135–450)
PLATELET # BLD: 182 K/UL (ref 135–450)
PLATELET # BLD: 193 K/UL (ref 135–450)
PLATELET # BLD: 193 K/UL (ref 135–450)
PLATELET # BLD: 196 K/UL (ref 135–450)
PLATELET # BLD: 203 K/UL (ref 135–450)
PLATELET # BLD: 220 K/UL (ref 135–450)
PLATELET # BLD: 222 K/UL (ref 135–450)
PLATELET # BLD: 254 K/UL (ref 135–450)
PLATELET # BLD: 259 K/UL (ref 135–450)
PLATELET # BLD: 91 K/UL (ref 135–450)
PLATELET # BLD: 92 K/UL (ref 135–450)
PLATELET # BLD: 92 K/UL (ref 135–450)
PLATELET # BLD: 93 K/UL (ref 135–450)
PLATELET # BLD: 94 K/UL (ref 135–450)
PLATELET # BLD: 95 K/UL (ref 135–450)
PLATELET SLIDE REVIEW: ABNORMAL
PLATELET SLIDE REVIEW: ADEQUATE
PMV BLD AUTO: 7.4 FL (ref 5–10.5)
PMV BLD AUTO: 7.6 FL (ref 5–10.5)
PMV BLD AUTO: 8.1 FL (ref 5–10.5)
PMV BLD AUTO: 8.2 FL (ref 5–10.5)
PMV BLD AUTO: 8.3 FL (ref 5–10.5)
PMV BLD AUTO: 8.6 FL (ref 5–10.5)
PMV BLD AUTO: 8.6 FL (ref 5–10.5)
PMV BLD AUTO: 8.7 FL (ref 5–10.5)
PMV BLD AUTO: 8.7 FL (ref 5–10.5)
PMV BLD AUTO: 8.8 FL (ref 5–10.5)
PMV BLD AUTO: 8.9 FL (ref 5–10.5)
PMV BLD AUTO: 9 FL (ref 5–10.5)
POIKILOCYTES: ABNORMAL
POLYCHROMASIA: ABNORMAL
POTASSIUM REFLEX MAGNESIUM: 3.9 MMOL/L (ref 3.5–5.1)
POTASSIUM REFLEX MAGNESIUM: 4.1 MMOL/L (ref 3.5–5.1)
POTASSIUM REFLEX MAGNESIUM: 4.3 MMOL/L (ref 3.5–5.1)
POTASSIUM REFLEX MAGNESIUM: 4.3 MMOL/L (ref 3.5–5.1)
POTASSIUM REFLEX MAGNESIUM: 4.8 MMOL/L (ref 3.5–5.1)
POTASSIUM REFLEX MAGNESIUM: 4.9 MMOL/L (ref 3.5–5.1)
POTASSIUM REFLEX MAGNESIUM: 4.9 MMOL/L (ref 3.5–5.1)
POTASSIUM REFLEX MAGNESIUM: 5 MMOL/L (ref 3.5–5.1)
POTASSIUM REFLEX MAGNESIUM: 5.1 MMOL/L (ref 3.5–5.1)
POTASSIUM REFLEX MAGNESIUM: 5.1 MMOL/L (ref 3.5–5.1)
POTASSIUM REFLEX MAGNESIUM: 5.2 MMOL/L (ref 3.5–5.1)
POTASSIUM REFLEX MAGNESIUM: 5.2 MMOL/L (ref 3.5–5.1)
POTASSIUM REFLEX MAGNESIUM: 5.3 MMOL/L (ref 3.5–5.1)
POTASSIUM REFLEX MAGNESIUM: 5.3 MMOL/L (ref 3.5–5.1)
POTASSIUM REFLEX MAGNESIUM: 5.7 MMOL/L (ref 3.5–5.1)
POTASSIUM SERPL-SCNC: 4.1 MMOL/L (ref 3.5–5.1)
POTASSIUM SERPL-SCNC: 4.4 MMOL/L (ref 3.5–5.1)
POTASSIUM SERPL-SCNC: 4.6 MMOL/L (ref 3.5–5.1)
POTASSIUM SERPL-SCNC: 4.7 MMOL/L (ref 3.5–5.1)
POTASSIUM SERPL-SCNC: 4.8 MMOL/L (ref 3.5–5.1)
POTASSIUM SERPL-SCNC: 4.9 MMOL/L (ref 3.5–5.1)
POTASSIUM SERPL-SCNC: 4.9 MMOL/L (ref 3.5–5.1)
POTASSIUM SERPL-SCNC: 5.2 MMOL/L (ref 3.5–5.1)
PRO-BNP: 529 PG/ML (ref 0–449)
PROCALCITONIN: 1 NG/ML (ref 0–0.15)
PROTEIN PROTEIN: 25 MG/DL
PROTEIN UA: 100 MG/DL
PROTEIN UA: 30 MG/DL
PROTEIN UA: ABNORMAL MG/DL
PROTEIN/CREAT RATIO: 0.4 MG/DL
PROTHROMBIN TIME: 12.9 SEC (ref 10–13.2)
PROTHROMBIN TIME: 15.8 SEC (ref 9.9–12.7)
RBC # BLD: 4.04 M/UL (ref 4.2–5.9)
RBC # BLD: 4.17 M/UL (ref 4.2–5.9)
RBC # BLD: 4.22 M/UL (ref 4.2–5.9)
RBC # BLD: 4.25 M/UL (ref 4.2–5.9)
RBC # BLD: 4.34 M/UL (ref 4.2–5.9)
RBC # BLD: 4.4 M/UL (ref 4.2–5.9)
RBC # BLD: 4.51 M/UL (ref 4.2–5.9)
RBC # BLD: 4.56 M/UL (ref 4.2–5.9)
RBC # BLD: 4.61 M/UL (ref 4.2–5.9)
RBC # BLD: 4.62 M/UL (ref 4.2–5.9)
RBC # BLD: 4.63 M/UL (ref 4.2–5.9)
RBC # BLD: 4.67 M/UL (ref 4.2–5.9)
RBC # BLD: 4.72 M/UL (ref 4.2–5.9)
RBC # BLD: 4.8 M/UL (ref 4.2–5.9)
RBC # BLD: 4.82 M/UL (ref 4.2–5.9)
RBC # BLD: 4.86 M/UL (ref 4.2–5.9)
RBC # BLD: 4.97 M/UL (ref 4.2–5.9)
RBC # BLD: 5.05 M/UL (ref 4.2–5.9)
RBC # BLD: NORMAL 10*6/UL
RBC # BLD: NORMAL 10*6/UL
RBC UA: ABNORMAL /HPF (ref 0–4)
RBC UA: NORMAL /HPF (ref 0–4)
RBC UA: NORMAL /HPF (ref 0–4)
SARS-COV-2, NAAT: DETECTED
SARS-COV-2: NOT DETECTED
SLIDE REVIEW: ABNORMAL
SMUDGE CELLS: PRESENT
SODIUM BLD-SCNC: 132 MMOL/L (ref 136–145)
SODIUM BLD-SCNC: 133 MMOL/L (ref 136–145)
SODIUM BLD-SCNC: 133 MMOL/L (ref 136–145)
SODIUM BLD-SCNC: 135 MMOL/L (ref 136–145)
SODIUM BLD-SCNC: 135 MMOL/L (ref 136–145)
SODIUM BLD-SCNC: 136 MMOL/L (ref 136–145)
SODIUM BLD-SCNC: 137 MMOL/L (ref 136–145)
SODIUM BLD-SCNC: 138 MMOL/L (ref 136–145)
SODIUM BLD-SCNC: 138 MMOL/L (ref 136–145)
SODIUM BLD-SCNC: 139 MMOL/L (ref 136–145)
SODIUM BLD-SCNC: 140 MMOL/L (ref 136–145)
SODIUM BLD-SCNC: 141 MMOL/L (ref 136–145)
SODIUM BLD-SCNC: 142 MMOL/L (ref 136–145)
SODIUM BLD-SCNC: 143 MMOL/L (ref 136–145)
SODIUM URINE: <20 MMOL/L
SPECIFIC GRAVITY UA: 1.02 (ref 1–1.03)
SPECIMEN STATUS: NORMAL
STONE DESCRIPTION: NORMAL
STONE NUMBER: NORMAL
STONE SIZE: NORMAL MM
T4 FREE: 1.6 NG/DL (ref 0.9–1.8)
TEAR DROP CELLS: ABNORMAL
TOTAL CK: 452 U/L (ref 39–308)
TOTAL PROTEIN: 5.6 G/DL (ref 6.4–8.2)
TOTAL PROTEIN: 6.2 G/DL (ref 6.4–8.2)
TOTAL PROTEIN: 6.4 G/DL (ref 6.4–8.2)
TOTAL PROTEIN: 6.5 G/DL (ref 6.4–8.2)
TOTAL PROTEIN: 6.6 G/DL (ref 6.4–8.2)
TOTAL PROTEIN: 6.6 G/DL (ref 6.4–8.2)
TOTAL PROTEIN: 6.7 G/DL (ref 6.4–8.2)
TOTAL PROTEIN: 6.8 G/DL (ref 6.4–8.2)
TOTAL PROTEIN: 6.9 G/DL (ref 6.4–8.2)
TOTAL PROTEIN: 7 G/DL (ref 6.4–8.2)
TOTAL PROTEIN: 7 G/DL (ref 6.4–8.2)
TOXIC GRANULATION: PRESENT
TRIGL SERPL-MCNC: 133 MG/DL (ref 0–150)
TRIGL SERPL-MCNC: 192 MG/DL (ref 0–150)
TROPONIN: 0.01 NG/ML
TROPONIN: <0.01 NG/ML
TSH REFLEX FT4: 5.75 UIU/ML (ref 0.27–4.2)
URINE TYPE: ABNORMAL
UROBILINOGEN, URINE: 0.2 E.U./DL
VITAMIN D 25-HYDROXY: 71.8 NG/ML
VLDLC SERPL CALC-MCNC: 27 MG/DL
VLDLC SERPL CALC-MCNC: 38 MG/DL
WBC # BLD: 18.2 K/UL (ref 4–11)
WBC # BLD: 22.3 K/UL (ref 4–11)
WBC # BLD: 26.2 K/UL (ref 4–11)
WBC # BLD: 28.2 K/UL (ref 4–11)
WBC # BLD: 30.7 K/UL (ref 4–11)
WBC # BLD: 31.9 K/UL (ref 4–11)
WBC # BLD: 32.5 K/UL (ref 4–11)
WBC # BLD: 34 K/UL (ref 4–11)
WBC # BLD: 35.8 K/UL (ref 4–11)
WBC # BLD: 35.8 K/UL (ref 4–11)
WBC # BLD: 36.3 K/UL (ref 4–11)
WBC # BLD: 37 K/UL (ref 4–11)
WBC # BLD: 38.7 K/UL (ref 4–11)
WBC # BLD: 39.1 K/UL (ref 4–11)
WBC # BLD: 39.1 K/UL (ref 4–11)
WBC # BLD: 39.2 K/UL (ref 4–11)
WBC # BLD: 43.6 K/UL (ref 4–11)
WBC # BLD: 44.8 K/UL (ref 4–11)
WBC # BLD: 46.9 K/UL (ref 4–11)
WBC # BLD: 47.2 K/UL (ref 4–11)
WBC UA: ABNORMAL /HPF (ref 0–5)
WBC UA: NORMAL /HPF (ref 0–5)
WBC UA: NORMAL /HPF (ref 0–5)

## 2021-01-01 PROCEDURE — 6360000002 HC RX W HCPCS: Performed by: INTERNAL MEDICINE

## 2021-01-01 PROCEDURE — 85379 FIBRIN DEGRADATION QUANT: CPT

## 2021-01-01 PROCEDURE — 99284 EMERGENCY DEPT VISIT MOD MDM: CPT

## 2021-01-01 PROCEDURE — 83605 ASSAY OF LACTIC ACID: CPT

## 2021-01-01 PROCEDURE — 6370000000 HC RX 637 (ALT 250 FOR IP): Performed by: INTERNAL MEDICINE

## 2021-01-01 PROCEDURE — 97116 GAIT TRAINING THERAPY: CPT

## 2021-01-01 PROCEDURE — 99212 OFFICE O/P EST SF 10 MIN: CPT | Performed by: NURSE PRACTITIONER

## 2021-01-01 PROCEDURE — 1123F ACP DISCUSS/DSCN MKR DOCD: CPT | Performed by: INTERNAL MEDICINE

## 2021-01-01 PROCEDURE — 1200000000 HC SEMI PRIVATE

## 2021-01-01 PROCEDURE — 36415 COLL VENOUS BLD VENIPUNCTURE: CPT

## 2021-01-01 PROCEDURE — 97530 THERAPEUTIC ACTIVITIES: CPT

## 2021-01-01 PROCEDURE — 99214 OFFICE O/P EST MOD 30 MIN: CPT | Performed by: INTERNAL MEDICINE

## 2021-01-01 PROCEDURE — 99211 OFF/OP EST MAY X REQ PHY/QHP: CPT | Performed by: NURSE PRACTITIONER

## 2021-01-01 PROCEDURE — 80061 LIPID PANEL: CPT

## 2021-01-01 PROCEDURE — G8420 CALC BMI NORM PARAMETERS: HCPCS | Performed by: INTERNAL MEDICINE

## 2021-01-01 PROCEDURE — 80053 COMPREHEN METABOLIC PANEL: CPT

## 2021-01-01 PROCEDURE — 6370000000 HC RX 637 (ALT 250 FOR IP): Performed by: HOSPITALIST

## 2021-01-01 PROCEDURE — 74420 UROGRAPHY RTRGR +-KUB: CPT

## 2021-01-01 PROCEDURE — 99223 1ST HOSP IP/OBS HIGH 75: CPT | Performed by: INTERNAL MEDICINE

## 2021-01-01 PROCEDURE — 6370000000 HC RX 637 (ALT 250 FOR IP): Performed by: UROLOGY

## 2021-01-01 PROCEDURE — BT1F1ZZ FLUOROSCOPY OF LEFT KIDNEY, URETER AND BLADDER USING LOW OSMOLAR CONTRAST: ICD-10-PCS | Performed by: UROLOGY

## 2021-01-01 PROCEDURE — 82784 ASSAY IGA/IGD/IGG/IGM EACH: CPT

## 2021-01-01 PROCEDURE — 99233 SBSQ HOSP IP/OBS HIGH 50: CPT | Performed by: INTERNAL MEDICINE

## 2021-01-01 PROCEDURE — 2700000000 HC OXYGEN THERAPY PER DAY

## 2021-01-01 PROCEDURE — 82570 ASSAY OF URINE CREATININE: CPT

## 2021-01-01 PROCEDURE — 2580000003 HC RX 258: Performed by: INTERNAL MEDICINE

## 2021-01-01 PROCEDURE — 97110 THERAPEUTIC EXERCISES: CPT

## 2021-01-01 PROCEDURE — G8428 CUR MEDS NOT DOCUMENT: HCPCS | Performed by: NURSE PRACTITIONER

## 2021-01-01 PROCEDURE — 76775 US EXAM ABDO BACK WALL LIM: CPT

## 2021-01-01 PROCEDURE — 6360000002 HC RX W HCPCS: Performed by: FAMILY MEDICINE

## 2021-01-01 PROCEDURE — 2580000003 HC RX 258: Performed by: FAMILY MEDICINE

## 2021-01-01 PROCEDURE — 99232 SBSQ HOSP IP/OBS MODERATE 35: CPT | Performed by: INTERNAL MEDICINE

## 2021-01-01 PROCEDURE — 80048 BASIC METABOLIC PNL TOTAL CA: CPT

## 2021-01-01 PROCEDURE — 94761 N-INVAS EAR/PLS OXIMETRY MLT: CPT

## 2021-01-01 PROCEDURE — 6370000000 HC RX 637 (ALT 250 FOR IP): Performed by: FAMILY MEDICINE

## 2021-01-01 PROCEDURE — 3051F HG A1C>EQUAL 7.0%<8.0%: CPT | Performed by: INTERNAL MEDICINE

## 2021-01-01 PROCEDURE — 83036 HEMOGLOBIN GLYCOSYLATED A1C: CPT

## 2021-01-01 PROCEDURE — 88300 SURGICAL PATH GROSS: CPT

## 2021-01-01 PROCEDURE — 0T7D8ZZ DILATION OF URETHRA, VIA NATURAL OR ARTIFICIAL OPENING ENDOSCOPIC: ICD-10-PCS | Performed by: UROLOGY

## 2021-01-01 PROCEDURE — 72074 X-RAY EXAM THORAC SPINE4/>VW: CPT

## 2021-01-01 PROCEDURE — XW0DXM6 INTRODUCTION OF BARICITINIB INTO MOUTH AND PHARYNX, EXTERNAL APPROACH, NEW TECHNOLOGY GROUP 6: ICD-10-PCS | Performed by: INTERNAL MEDICINE

## 2021-01-01 PROCEDURE — 94640 AIRWAY INHALATION TREATMENT: CPT

## 2021-01-01 PROCEDURE — 2500000003 HC RX 250 WO HCPCS: Performed by: NURSE ANESTHETIST, CERTIFIED REGISTERED

## 2021-01-01 PROCEDURE — 1250000000 HC SEMI PRIVATE HOSPICE R&B

## 2021-01-01 PROCEDURE — 6360000002 HC RX W HCPCS: Performed by: NURSE ANESTHETIST, CERTIFIED REGISTERED

## 2021-01-01 PROCEDURE — 92526 ORAL FUNCTION THERAPY: CPT

## 2021-01-01 PROCEDURE — 85025 COMPLETE CBC W/AUTO DIFF WBC: CPT

## 2021-01-01 PROCEDURE — C9113 INJ PANTOPRAZOLE SODIUM, VIA: HCPCS | Performed by: INTERNAL MEDICINE

## 2021-01-01 PROCEDURE — 71250 CT THORAX DX C-: CPT

## 2021-01-01 PROCEDURE — 2060000000 HC ICU INTERMEDIATE R&B

## 2021-01-01 PROCEDURE — 86901 BLOOD TYPING SEROLOGIC RH(D): CPT

## 2021-01-01 PROCEDURE — 86140 C-REACTIVE PROTEIN: CPT

## 2021-01-01 PROCEDURE — 80069 RENAL FUNCTION PANEL: CPT

## 2021-01-01 PROCEDURE — 2580000003 HC RX 258: Performed by: UROLOGY

## 2021-01-01 PROCEDURE — 3700000001 HC ADD 15 MINUTES (ANESTHESIA): Performed by: UROLOGY

## 2021-01-01 PROCEDURE — 71045 X-RAY EXAM CHEST 1 VIEW: CPT

## 2021-01-01 PROCEDURE — 81001 URINALYSIS AUTO W/SCOPE: CPT

## 2021-01-01 PROCEDURE — 85730 THROMBOPLASTIN TIME PARTIAL: CPT

## 2021-01-01 PROCEDURE — 84484 ASSAY OF TROPONIN QUANT: CPT

## 2021-01-01 PROCEDURE — 86850 RBC ANTIBODY SCREEN: CPT

## 2021-01-01 PROCEDURE — 3600000014 HC SURGERY LEVEL 4 ADDTL 15MIN: Performed by: UROLOGY

## 2021-01-01 PROCEDURE — 6360000004 HC RX CONTRAST MEDICATION: Performed by: UROLOGY

## 2021-01-01 PROCEDURE — 99213 OFFICE O/P EST LOW 20 MIN: CPT | Performed by: INTERNAL MEDICINE

## 2021-01-01 PROCEDURE — G8419 CALC BMI OUT NRM PARAM NOF/U: HCPCS | Performed by: NURSE PRACTITIONER

## 2021-01-01 PROCEDURE — 84145 PROCALCITONIN (PCT): CPT

## 2021-01-01 PROCEDURE — 6370000000 HC RX 637 (ALT 250 FOR IP): Performed by: NURSE PRACTITIONER

## 2021-01-01 PROCEDURE — 6360000002 HC RX W HCPCS: Performed by: UROLOGY

## 2021-01-01 PROCEDURE — 2500000003 HC RX 250 WO HCPCS: Performed by: INTERNAL MEDICINE

## 2021-01-01 PROCEDURE — 84439 ASSAY OF FREE THYROXINE: CPT

## 2021-01-01 PROCEDURE — 99232 SBSQ HOSP IP/OBS MODERATE 35: CPT | Performed by: NURSE PRACTITIONER

## 2021-01-01 PROCEDURE — 99285 EMERGENCY DEPT VISIT HI MDM: CPT

## 2021-01-01 PROCEDURE — G0439 PPPS, SUBSEQ VISIT: HCPCS | Performed by: NURSE PRACTITIONER

## 2021-01-01 PROCEDURE — 4040F PNEUMOC VAC/ADMIN/RCVD: CPT | Performed by: INTERNAL MEDICINE

## 2021-01-01 PROCEDURE — 99222 1ST HOSP IP/OBS MODERATE 55: CPT | Performed by: INTERNAL MEDICINE

## 2021-01-01 PROCEDURE — 1123F ACP DISCUSS/DSCN MKR DOCD: CPT | Performed by: NURSE PRACTITIONER

## 2021-01-01 PROCEDURE — 99233 SBSQ HOSP IP/OBS HIGH 50: CPT | Performed by: NURSE PRACTITIONER

## 2021-01-01 PROCEDURE — C1729 CATH, DRAINAGE: HCPCS | Performed by: UROLOGY

## 2021-01-01 PROCEDURE — 82728 ASSAY OF FERRITIN: CPT

## 2021-01-01 PROCEDURE — 2580000003 HC RX 258: Performed by: ANESTHESIOLOGY

## 2021-01-01 PROCEDURE — 3600000004 HC SURGERY LEVEL 4 BASE: Performed by: UROLOGY

## 2021-01-01 PROCEDURE — 84300 ASSAY OF URINE SODIUM: CPT

## 2021-01-01 PROCEDURE — 86900 BLOOD TYPING SEROLOGIC ABO: CPT

## 2021-01-01 PROCEDURE — G8420 CALC BMI NORM PARAMETERS: HCPCS | Performed by: NURSE PRACTITIONER

## 2021-01-01 PROCEDURE — 83880 ASSAY OF NATRIURETIC PEPTIDE: CPT

## 2021-01-01 PROCEDURE — 85610 PROTHROMBIN TIME: CPT

## 2021-01-01 PROCEDURE — 85027 COMPLETE CBC AUTOMATED: CPT

## 2021-01-01 PROCEDURE — 97162 PT EVAL MOD COMPLEX 30 MIN: CPT

## 2021-01-01 PROCEDURE — 92610 EVALUATE SWALLOWING FUNCTION: CPT

## 2021-01-01 PROCEDURE — 82365 CALCULUS SPECTROSCOPY: CPT

## 2021-01-01 PROCEDURE — C1726 CATH, BAL DIL, NON-VASCULAR: HCPCS | Performed by: UROLOGY

## 2021-01-01 PROCEDURE — 93005 ELECTROCARDIOGRAM TRACING: CPT | Performed by: EMERGENCY MEDICINE

## 2021-01-01 PROCEDURE — 84156 ASSAY OF PROTEIN URINE: CPT

## 2021-01-01 PROCEDURE — 7100000000 HC PACU RECOVERY - FIRST 15 MIN: Performed by: UROLOGY

## 2021-01-01 PROCEDURE — 93000 ELECTROCARDIOGRAM COMPLETE: CPT | Performed by: NURSE PRACTITIONER

## 2021-01-01 PROCEDURE — 93306 TTE W/DOPPLER COMPLETE: CPT

## 2021-01-01 PROCEDURE — 2709999900 HC NON-CHARGEABLE SUPPLY: Performed by: UROLOGY

## 2021-01-01 PROCEDURE — C2628 CATHETER, OCCLUSION: HCPCS | Performed by: UROLOGY

## 2021-01-01 PROCEDURE — 2720000010 HC SURG SUPPLY STERILE: Performed by: UROLOGY

## 2021-01-01 PROCEDURE — 93005 ELECTROCARDIOGRAM TRACING: CPT | Performed by: PHYSICIAN ASSISTANT

## 2021-01-01 PROCEDURE — 0T25X0Z CHANGE DRAINAGE DEVICE IN KIDNEY, EXTERNAL APPROACH: ICD-10-PCS | Performed by: UROLOGY

## 2021-01-01 PROCEDURE — 93010 ELECTROCARDIOGRAM REPORT: CPT | Performed by: INTERNAL MEDICINE

## 2021-01-01 PROCEDURE — 1036F TOBACCO NON-USER: CPT | Performed by: INTERNAL MEDICINE

## 2021-01-01 PROCEDURE — G8484 FLU IMMUNIZE NO ADMIN: HCPCS | Performed by: INTERNAL MEDICINE

## 2021-01-01 PROCEDURE — G8427 DOCREV CUR MEDS BY ELIG CLIN: HCPCS | Performed by: INTERNAL MEDICINE

## 2021-01-01 PROCEDURE — 50433 PLMT NEPHROURETERAL CATHETER: CPT

## 2021-01-01 PROCEDURE — 97535 SELF CARE MNGMENT TRAINING: CPT

## 2021-01-01 PROCEDURE — C1894 INTRO/SHEATH, NON-LASER: HCPCS

## 2021-01-01 PROCEDURE — 97166 OT EVAL MOD COMPLEX 45 MIN: CPT

## 2021-01-01 PROCEDURE — 4040F PNEUMOC VAC/ADMIN/RCVD: CPT | Performed by: NURSE PRACTITIONER

## 2021-01-01 PROCEDURE — 82550 ASSAY OF CK (CPK): CPT

## 2021-01-01 PROCEDURE — 74176 CT ABD & PELVIS W/O CONTRAST: CPT

## 2021-01-01 PROCEDURE — 2500000003 HC RX 250 WO HCPCS: Performed by: ANESTHESIOLOGY

## 2021-01-01 PROCEDURE — G8427 DOCREV CUR MEDS BY ELIG CLIN: HCPCS | Performed by: NURSE PRACTITIONER

## 2021-01-01 PROCEDURE — 82306 VITAMIN D 25 HYDROXY: CPT

## 2021-01-01 PROCEDURE — 87635 SARS-COV-2 COVID-19 AMP PRB: CPT

## 2021-01-01 PROCEDURE — 7100000001 HC PACU RECOVERY - ADDTL 15 MIN: Performed by: UROLOGY

## 2021-01-01 PROCEDURE — 3051F HG A1C>EQUAL 7.0%<8.0%: CPT | Performed by: NURSE PRACTITIONER

## 2021-01-01 PROCEDURE — 2500000003 HC RX 250 WO HCPCS: Performed by: UROLOGY

## 2021-01-01 PROCEDURE — 3700000000 HC ANESTHESIA ATTENDED CARE: Performed by: UROLOGY

## 2021-01-01 PROCEDURE — 0TC43ZZ EXTIRPATION OF MATTER FROM LEFT KIDNEY PELVIS, PERCUTANEOUS APPROACH: ICD-10-PCS | Performed by: UROLOGY

## 2021-01-01 PROCEDURE — G8510 SCR DEP NEG, NO PLAN REQD: HCPCS | Performed by: INTERNAL MEDICINE

## 2021-01-01 PROCEDURE — 84443 ASSAY THYROID STIM HORMONE: CPT

## 2021-01-01 PROCEDURE — 93970 EXTREMITY STUDY: CPT

## 2021-01-01 PROCEDURE — C1769 GUIDE WIRE: HCPCS | Performed by: UROLOGY

## 2021-01-01 RX ORDER — INSULIN GLARGINE 100 [IU]/ML
15 INJECTION, SOLUTION SUBCUTANEOUS 2 TIMES DAILY
Status: DISCONTINUED | OUTPATIENT
Start: 2021-01-01 | End: 2021-01-01

## 2021-01-01 RX ORDER — POLYETHYLENE GLYCOL 3350 17 G/17G
17 POWDER, FOR SOLUTION ORAL DAILY PRN
Status: DISCONTINUED | OUTPATIENT
Start: 2021-01-01 | End: 2021-01-01 | Stop reason: HOSPADM

## 2021-01-01 RX ORDER — LORAZEPAM 2 MG/ML
1 INJECTION INTRAMUSCULAR
Status: CANCELLED | OUTPATIENT
Start: 2021-01-01

## 2021-01-01 RX ORDER — LEVALBUTEROL 1.25 MG/.5ML
0.63 SOLUTION, CONCENTRATE RESPIRATORY (INHALATION) EVERY 6 HOURS PRN
Status: DISCONTINUED | OUTPATIENT
Start: 2021-01-01 | End: 2021-01-01 | Stop reason: HOSPADM

## 2021-01-01 RX ORDER — DILTIAZEM HYDROCHLORIDE 120 MG/1
120 CAPSULE, COATED, EXTENDED RELEASE ORAL DAILY
Status: DISCONTINUED | OUTPATIENT
Start: 2021-01-01 | End: 2021-01-01 | Stop reason: HOSPADM

## 2021-01-01 RX ORDER — ONDANSETRON 4 MG/1
4 TABLET, ORALLY DISINTEGRATING ORAL EVERY 8 HOURS PRN
Status: DISCONTINUED | OUTPATIENT
Start: 2021-01-01 | End: 2021-01-01

## 2021-01-01 RX ORDER — HEPARIN SODIUM 1000 [USP'U]/ML
80 INJECTION, SOLUTION INTRAVENOUS; SUBCUTANEOUS ONCE
Status: COMPLETED | OUTPATIENT
Start: 2021-01-01 | End: 2021-01-01

## 2021-01-01 RX ORDER — HEPARIN SODIUM 1000 [USP'U]/ML
80 INJECTION, SOLUTION INTRAVENOUS; SUBCUTANEOUS PRN
Status: DISCONTINUED | OUTPATIENT
Start: 2021-01-01 | End: 2021-01-01 | Stop reason: HOSPADM

## 2021-01-01 RX ORDER — ROSUVASTATIN CALCIUM 40 MG/1
TABLET, COATED ORAL
Qty: 90 TABLET | Refills: 1 | Status: SHIPPED | OUTPATIENT
Start: 2021-01-01

## 2021-01-01 RX ORDER — VITAMIN B COMPLEX
2000 TABLET ORAL DAILY
Status: COMPLETED | OUTPATIENT
Start: 2021-01-01 | End: 2021-01-01

## 2021-01-01 RX ORDER — GLYCOPYRROLATE 0.2 MG/ML
INJECTION INTRAMUSCULAR; INTRAVENOUS PRN
Status: DISCONTINUED | OUTPATIENT
Start: 2021-01-01 | End: 2021-01-01 | Stop reason: SDUPTHER

## 2021-01-01 RX ORDER — DEXTROSE MONOHYDRATE 50 MG/ML
100 INJECTION, SOLUTION INTRAVENOUS PRN
Status: DISCONTINUED | OUTPATIENT
Start: 2021-01-01 | End: 2021-01-01 | Stop reason: HOSPADM

## 2021-01-01 RX ORDER — SODIUM CHLORIDE 0.9 % (FLUSH) 0.9 %
10 SYRINGE (ML) INJECTION PRN
Status: DISCONTINUED | OUTPATIENT
Start: 2021-01-01 | End: 2021-01-01 | Stop reason: HOSPADM

## 2021-01-01 RX ORDER — HEPARIN SODIUM 5000 [USP'U]/ML
5000 INJECTION, SOLUTION INTRAVENOUS; SUBCUTANEOUS EVERY 8 HOURS SCHEDULED
Status: DISCONTINUED | OUTPATIENT
Start: 2021-01-01 | End: 2021-01-01

## 2021-01-01 RX ORDER — LORAZEPAM 2 MG/ML
1 INJECTION INTRAMUSCULAR
Status: DISCONTINUED | OUTPATIENT
Start: 2021-01-01 | End: 2021-01-01 | Stop reason: HOSPADM

## 2021-01-01 RX ORDER — LABETALOL HYDROCHLORIDE 5 MG/ML
5 INJECTION, SOLUTION INTRAVENOUS EVERY 10 MIN PRN
Status: DISCONTINUED | OUTPATIENT
Start: 2021-01-01 | End: 2021-01-01 | Stop reason: HOSPADM

## 2021-01-01 RX ORDER — ACETAMINOPHEN 650 MG/1
650 SUPPOSITORY RECTAL EVERY 6 HOURS PRN
Status: DISCONTINUED | OUTPATIENT
Start: 2021-01-01 | End: 2021-01-01 | Stop reason: HOSPADM

## 2021-01-01 RX ORDER — ASPIRIN 81 MG/1
81 TABLET, CHEWABLE ORAL DAILY
Status: DISCONTINUED | OUTPATIENT
Start: 2021-01-01 | End: 2021-01-01 | Stop reason: HOSPADM

## 2021-01-01 RX ORDER — ROSUVASTATIN CALCIUM 10 MG/1
40 TABLET, COATED ORAL DAILY
Status: DISCONTINUED | OUTPATIENT
Start: 2021-01-01 | End: 2021-01-01 | Stop reason: DRUGHIGH

## 2021-01-01 RX ORDER — METHYLPREDNISOLONE SODIUM SUCCINATE 40 MG/ML
40 INJECTION, POWDER, LYOPHILIZED, FOR SOLUTION INTRAMUSCULAR; INTRAVENOUS DAILY
Status: DISCONTINUED | OUTPATIENT
Start: 2021-01-01 | End: 2021-01-01 | Stop reason: HOSPADM

## 2021-01-01 RX ORDER — ONDANSETRON 2 MG/ML
INJECTION INTRAMUSCULAR; INTRAVENOUS PRN
Status: DISCONTINUED | OUTPATIENT
Start: 2021-01-01 | End: 2021-01-01 | Stop reason: SDUPTHER

## 2021-01-01 RX ORDER — INSULIN GLARGINE 100 [IU]/ML
25 INJECTION, SOLUTION SUBCUTANEOUS 2 TIMES DAILY
Status: DISCONTINUED | OUTPATIENT
Start: 2021-01-01 | End: 2021-01-01

## 2021-01-01 RX ORDER — ROSUVASTATIN CALCIUM 10 MG/1
10 TABLET, COATED ORAL NIGHTLY
Status: DISCONTINUED | OUTPATIENT
Start: 2021-01-01 | End: 2021-01-01 | Stop reason: HOSPADM

## 2021-01-01 RX ORDER — DIGOXIN 125 MCG
0.06 TABLET ORAL
Status: DISCONTINUED | OUTPATIENT
Start: 2021-01-01 | End: 2021-01-01 | Stop reason: HOSPADM

## 2021-01-01 RX ORDER — SODIUM CHLORIDE 9 MG/ML
25 INJECTION, SOLUTION INTRAVENOUS PRN
Status: DISCONTINUED | OUTPATIENT
Start: 2021-01-01 | End: 2021-01-01 | Stop reason: HOSPADM

## 2021-01-01 RX ORDER — MORPHINE SULFATE 10 MG/ML
1 INJECTION, SOLUTION INTRAMUSCULAR; INTRAVENOUS EVERY 5 MIN PRN
Status: DISCONTINUED | OUTPATIENT
Start: 2021-01-01 | End: 2021-01-01 | Stop reason: HOSPADM

## 2021-01-01 RX ORDER — MAGNESIUM HYDROXIDE 1200 MG/15ML
LIQUID ORAL CONTINUOUS PRN
Status: COMPLETED | OUTPATIENT
Start: 2021-01-01 | End: 2021-01-01

## 2021-01-01 RX ORDER — ROSUVASTATIN CALCIUM 10 MG/1
40 TABLET, COATED ORAL DAILY
Status: DISCONTINUED | OUTPATIENT
Start: 2021-01-01 | End: 2021-01-01 | Stop reason: HOSPADM

## 2021-01-01 RX ORDER — INSULIN GLARGINE 100 [IU]/ML
23 INJECTION, SOLUTION SUBCUTANEOUS NIGHTLY
Status: DISCONTINUED | OUTPATIENT
Start: 2021-01-01 | End: 2021-01-01

## 2021-01-01 RX ORDER — MORPHINE SULFATE 4 MG/ML
4 INJECTION, SOLUTION INTRAMUSCULAR; INTRAVENOUS
Status: DISCONTINUED | OUTPATIENT
Start: 2021-01-01 | End: 2021-01-01 | Stop reason: HOSPADM

## 2021-01-01 RX ORDER — LIDOCAINE HYDROCHLORIDE 20 MG/ML
INJECTION, SOLUTION EPIDURAL; INFILTRATION; INTRACAUDAL; PERINEURAL PRN
Status: DISCONTINUED | OUTPATIENT
Start: 2021-01-01 | End: 2021-01-01 | Stop reason: SDUPTHER

## 2021-01-01 RX ORDER — HEPARIN SODIUM 1000 [USP'U]/ML
40 INJECTION, SOLUTION INTRAVENOUS; SUBCUTANEOUS ONCE
Status: COMPLETED | OUTPATIENT
Start: 2021-01-01 | End: 2021-01-01

## 2021-01-01 RX ORDER — DIPHENHYDRAMINE HYDROCHLORIDE 50 MG/ML
12.5 INJECTION INTRAMUSCULAR; INTRAVENOUS
Status: DISCONTINUED | OUTPATIENT
Start: 2021-01-01 | End: 2021-01-01 | Stop reason: HOSPADM

## 2021-01-01 RX ORDER — SENNA AND DOCUSATE SODIUM 50; 8.6 MG/1; MG/1
1 TABLET, FILM COATED ORAL 2 TIMES DAILY
Status: DISCONTINUED | OUTPATIENT
Start: 2021-01-01 | End: 2021-01-01 | Stop reason: HOSPADM

## 2021-01-01 RX ORDER — INSULIN GLARGINE 100 [IU]/ML
15 INJECTION, SOLUTION SUBCUTANEOUS 2 TIMES DAILY
Status: DISCONTINUED | OUTPATIENT
Start: 2021-01-01 | End: 2021-01-01 | Stop reason: SDUPTHER

## 2021-01-01 RX ORDER — PROCHLORPERAZINE EDISYLATE 5 MG/ML
5 INJECTION INTRAMUSCULAR; INTRAVENOUS EVERY 6 HOURS PRN
Status: DISCONTINUED | OUTPATIENT
Start: 2021-01-01 | End: 2021-01-01 | Stop reason: HOSPADM

## 2021-01-01 RX ORDER — LEVALBUTEROL 1.25 MG/.5ML
0.63 SOLUTION, CONCENTRATE RESPIRATORY (INHALATION) EVERY 6 HOURS PRN
Status: CANCELLED | OUTPATIENT
Start: 2021-01-01

## 2021-01-01 RX ORDER — TRAMADOL HYDROCHLORIDE 50 MG/1
100 TABLET ORAL EVERY 12 HOURS PRN
Status: DISCONTINUED | OUTPATIENT
Start: 2021-01-01 | End: 2021-01-01 | Stop reason: HOSPADM

## 2021-01-01 RX ORDER — TRAMADOL HYDROCHLORIDE 50 MG/1
50 TABLET ORAL EVERY 6 HOURS PRN
Status: DISCONTINUED | OUTPATIENT
Start: 2021-01-01 | End: 2021-01-01 | Stop reason: HOSPADM

## 2021-01-01 RX ORDER — METOPROLOL TARTRATE 5 MG/5ML
5 INJECTION INTRAVENOUS EVERY 6 HOURS PRN
Status: DISCONTINUED | OUTPATIENT
Start: 2021-01-01 | End: 2021-01-01 | Stop reason: HOSPADM

## 2021-01-01 RX ORDER — ONDANSETRON 2 MG/ML
4 INJECTION INTRAMUSCULAR; INTRAVENOUS EVERY 6 HOURS PRN
Status: DISCONTINUED | OUTPATIENT
Start: 2021-01-01 | End: 2021-01-01

## 2021-01-01 RX ORDER — OXYCODONE HYDROCHLORIDE AND ACETAMINOPHEN 5; 325 MG/1; MG/1
0.5 TABLET ORAL EVERY 4 HOURS PRN
Qty: 10 TABLET | Refills: 0 | Status: SHIPPED | OUTPATIENT
Start: 2021-01-01 | End: 2021-01-01

## 2021-01-01 RX ORDER — ACETAMINOPHEN 325 MG/1
650 TABLET ORAL EVERY 6 HOURS PRN
Status: DISCONTINUED | OUTPATIENT
Start: 2021-01-01 | End: 2021-01-01 | Stop reason: HOSPADM

## 2021-01-01 RX ORDER — SODIUM CHLORIDE 0.9 % (FLUSH) 0.9 %
10 SYRINGE (ML) INJECTION EVERY 12 HOURS SCHEDULED
Status: DISCONTINUED | OUTPATIENT
Start: 2021-01-01 | End: 2021-01-01 | Stop reason: HOSPADM

## 2021-01-01 RX ORDER — PROMETHAZINE HYDROCHLORIDE 25 MG/ML
6.25 INJECTION, SOLUTION INTRAMUSCULAR; INTRAVENOUS
Status: DISCONTINUED | OUTPATIENT
Start: 2021-01-01 | End: 2021-01-01 | Stop reason: HOSPADM

## 2021-01-01 RX ORDER — ACETAMINOPHEN 325 MG/1
650 TABLET ORAL EVERY 6 HOURS
Status: DISCONTINUED | OUTPATIENT
Start: 2021-01-01 | End: 2021-01-01 | Stop reason: HOSPADM

## 2021-01-01 RX ORDER — ONDANSETRON 2 MG/ML
4 INJECTION INTRAMUSCULAR; INTRAVENOUS EVERY 6 HOURS PRN
Status: DISCONTINUED | OUTPATIENT
Start: 2021-01-01 | End: 2021-01-01 | Stop reason: HOSPADM

## 2021-01-01 RX ORDER — MIDODRINE HYDROCHLORIDE 5 MG/1
5 TABLET ORAL
Status: DISCONTINUED | OUTPATIENT
Start: 2021-01-01 | End: 2021-01-01 | Stop reason: HOSPADM

## 2021-01-01 RX ORDER — OXYCODONE HYDROCHLORIDE AND ACETAMINOPHEN 5; 325 MG/1; MG/1
2 TABLET ORAL PRN
Status: DISCONTINUED | OUTPATIENT
Start: 2021-01-01 | End: 2021-01-01 | Stop reason: HOSPADM

## 2021-01-01 RX ORDER — INSULIN GLARGINE 100 [IU]/ML
40 INJECTION, SOLUTION SUBCUTANEOUS 2 TIMES DAILY
Status: DISCONTINUED | OUTPATIENT
Start: 2021-01-01 | End: 2021-01-01

## 2021-01-01 RX ORDER — ESCITALOPRAM OXALATE 10 MG/1
10 TABLET ORAL DAILY
Status: DISCONTINUED | OUTPATIENT
Start: 2021-01-01 | End: 2021-01-01 | Stop reason: HOSPADM

## 2021-01-01 RX ORDER — DEXTROSE MONOHYDRATE 25 G/50ML
12.5 INJECTION, SOLUTION INTRAVENOUS PRN
Status: DISCONTINUED | OUTPATIENT
Start: 2021-01-01 | End: 2021-01-01 | Stop reason: HOSPADM

## 2021-01-01 RX ORDER — SODIUM CHLORIDE 9 MG/ML
INJECTION, SOLUTION INTRAVENOUS CONTINUOUS
Status: DISCONTINUED | OUTPATIENT
Start: 2021-01-01 | End: 2021-01-01 | Stop reason: HOSPADM

## 2021-01-01 RX ORDER — MORPHINE SULFATE 2 MG/ML
2 INJECTION, SOLUTION INTRAMUSCULAR; INTRAVENOUS EVERY 4 HOURS PRN
Status: DISCONTINUED | OUTPATIENT
Start: 2021-01-01 | End: 2021-01-01

## 2021-01-01 RX ORDER — VITAMIN B COMPLEX
6000 TABLET ORAL DAILY
Status: DISCONTINUED | OUTPATIENT
Start: 2021-01-01 | End: 2021-01-01

## 2021-01-01 RX ORDER — BUPIVACAINE HYDROCHLORIDE 5 MG/ML
INJECTION, SOLUTION EPIDURAL; INTRACAUDAL PRN
Status: DISCONTINUED | OUTPATIENT
Start: 2021-01-01 | End: 2021-01-01 | Stop reason: HOSPADM

## 2021-01-01 RX ORDER — SULFAMETHOXAZOLE AND TRIMETHOPRIM 400; 80 MG/1; MG/1
1 TABLET ORAL 2 TIMES DAILY
Qty: 8 TABLET | Refills: 0 | Status: SHIPPED | OUTPATIENT
Start: 2021-01-01 | End: 2021-01-01

## 2021-01-01 RX ORDER — MEPERIDINE HYDROCHLORIDE 25 MG/ML
12.5 INJECTION INTRAMUSCULAR; INTRAVENOUS; SUBCUTANEOUS EVERY 5 MIN PRN
Status: DISCONTINUED | OUTPATIENT
Start: 2021-01-01 | End: 2021-01-01 | Stop reason: HOSPADM

## 2021-01-01 RX ORDER — GUAIFENESIN/DEXTROMETHORPHAN 100-10MG/5
5 SYRUP ORAL EVERY 4 HOURS PRN
Status: DISCONTINUED | OUTPATIENT
Start: 2021-01-01 | End: 2021-01-01 | Stop reason: HOSPADM

## 2021-01-01 RX ORDER — MORPHINE SULFATE 10 MG/ML
2 INJECTION, SOLUTION INTRAMUSCULAR; INTRAVENOUS EVERY 5 MIN PRN
Status: DISCONTINUED | OUTPATIENT
Start: 2021-01-01 | End: 2021-01-01 | Stop reason: HOSPADM

## 2021-01-01 RX ORDER — PANTOPRAZOLE SODIUM 40 MG/10ML
40 INJECTION, POWDER, LYOPHILIZED, FOR SOLUTION INTRAVENOUS DAILY
Status: DISCONTINUED | OUTPATIENT
Start: 2021-01-01 | End: 2021-01-01 | Stop reason: HOSPADM

## 2021-01-01 RX ORDER — MORPHINE SULFATE 4 MG/ML
4 INJECTION, SOLUTION INTRAMUSCULAR; INTRAVENOUS
Status: CANCELLED | OUTPATIENT
Start: 2021-01-01

## 2021-01-01 RX ORDER — ALBUMIN (HUMAN) 12.5 G/50ML
25 SOLUTION INTRAVENOUS EVERY 8 HOURS
Status: DISCONTINUED | OUTPATIENT
Start: 2021-01-01 | End: 2021-01-01 | Stop reason: HOSPADM

## 2021-01-01 RX ORDER — HEPARIN SODIUM 10000 [USP'U]/100ML
930 INJECTION, SOLUTION INTRAVENOUS CONTINUOUS
Status: DISCONTINUED | OUTPATIENT
Start: 2021-01-01 | End: 2021-01-01 | Stop reason: HOSPADM

## 2021-01-01 RX ORDER — DOCUSATE SODIUM 100 MG/1
100 CAPSULE, LIQUID FILLED ORAL 2 TIMES DAILY
Qty: 60 CAPSULE | Refills: 0 | Status: SHIPPED | OUTPATIENT
Start: 2021-01-01 | End: 2021-01-01

## 2021-01-01 RX ORDER — SODIUM CHLORIDE, SODIUM LACTATE, POTASSIUM CHLORIDE, CALCIUM CHLORIDE 600; 310; 30; 20 MG/100ML; MG/100ML; MG/100ML; MG/100ML
INJECTION, SOLUTION INTRAVENOUS CONTINUOUS
Status: DISCONTINUED | OUTPATIENT
Start: 2021-01-01 | End: 2021-01-01

## 2021-01-01 RX ORDER — TAMSULOSIN HYDROCHLORIDE 0.4 MG/1
0.4 CAPSULE ORAL DAILY
Status: DISCONTINUED | OUTPATIENT
Start: 2021-01-01 | End: 2021-01-01 | Stop reason: HOSPADM

## 2021-01-01 RX ORDER — PANTOPRAZOLE SODIUM 40 MG/1
40 TABLET, DELAYED RELEASE ORAL
Status: DISCONTINUED | OUTPATIENT
Start: 2021-01-01 | End: 2021-01-01

## 2021-01-01 RX ORDER — METHYLPREDNISOLONE SODIUM SUCCINATE 40 MG/ML
40 INJECTION, POWDER, LYOPHILIZED, FOR SOLUTION INTRAMUSCULAR; INTRAVENOUS EVERY 12 HOURS
Status: DISCONTINUED | OUTPATIENT
Start: 2021-01-01 | End: 2021-01-01

## 2021-01-01 RX ORDER — BUDESONIDE AND FORMOTEROL FUMARATE DIHYDRATE 160; 4.5 UG/1; UG/1
2 AEROSOL RESPIRATORY (INHALATION) 2 TIMES DAILY
Status: DISCONTINUED | OUTPATIENT
Start: 2021-01-01 | End: 2021-01-01 | Stop reason: HOSPADM

## 2021-01-01 RX ORDER — SODIUM CHLORIDE 9 MG/ML
INJECTION, SOLUTION INTRAVENOUS CONTINUOUS
Status: DISCONTINUED | OUTPATIENT
Start: 2021-01-01 | End: 2021-01-01

## 2021-01-01 RX ORDER — HEPARIN SODIUM 1000 [USP'U]/ML
40 INJECTION, SOLUTION INTRAVENOUS; SUBCUTANEOUS PRN
Status: DISCONTINUED | OUTPATIENT
Start: 2021-01-01 | End: 2021-01-01 | Stop reason: HOSPADM

## 2021-01-01 RX ORDER — MECOBALAMIN 5000 MCG
5 TABLET,DISINTEGRATING ORAL NIGHTLY
Status: DISCONTINUED | OUTPATIENT
Start: 2021-01-01 | End: 2021-01-01 | Stop reason: HOSPADM

## 2021-01-01 RX ORDER — INSULIN GLARGINE 100 [IU]/ML
20 INJECTION, SOLUTION SUBCUTANEOUS NIGHTLY
Status: DISCONTINUED | OUTPATIENT
Start: 2021-01-01 | End: 2021-01-01

## 2021-01-01 RX ORDER — FENTANYL CITRATE 50 UG/ML
INJECTION, SOLUTION INTRAMUSCULAR; INTRAVENOUS PRN
Status: DISCONTINUED | OUTPATIENT
Start: 2021-01-01 | End: 2021-01-01 | Stop reason: SDUPTHER

## 2021-01-01 RX ORDER — ROCURONIUM BROMIDE 10 MG/ML
INJECTION, SOLUTION INTRAVENOUS PRN
Status: DISCONTINUED | OUTPATIENT
Start: 2021-01-01 | End: 2021-01-01 | Stop reason: SDUPTHER

## 2021-01-01 RX ORDER — SODIUM CHLORIDE 0.9 % (FLUSH) 0.9 %
5-40 SYRINGE (ML) INJECTION EVERY 12 HOURS SCHEDULED
Status: DISCONTINUED | OUTPATIENT
Start: 2021-01-01 | End: 2021-01-01 | Stop reason: HOSPADM

## 2021-01-01 RX ORDER — INSULIN GLARGINE 100 [IU]/ML
35 INJECTION, SOLUTION SUBCUTANEOUS 2 TIMES DAILY
Status: DISCONTINUED | OUTPATIENT
Start: 2021-01-01 | End: 2021-01-01

## 2021-01-01 RX ORDER — PROPOFOL 10 MG/ML
INJECTION, EMULSION INTRAVENOUS PRN
Status: DISCONTINUED | OUTPATIENT
Start: 2021-01-01 | End: 2021-01-01 | Stop reason: SDUPTHER

## 2021-01-01 RX ORDER — NICOTINE POLACRILEX 4 MG
15 LOZENGE BUCCAL PRN
Status: DISCONTINUED | OUTPATIENT
Start: 2021-01-01 | End: 2021-01-01 | Stop reason: HOSPADM

## 2021-01-01 RX ORDER — INSULIN GLARGINE 100 [IU]/ML
20 INJECTION, SOLUTION SUBCUTANEOUS 2 TIMES DAILY
Status: DISCONTINUED | OUTPATIENT
Start: 2021-01-01 | End: 2021-01-01

## 2021-01-01 RX ORDER — OXYCODONE HYDROCHLORIDE AND ACETAMINOPHEN 5; 325 MG/1; MG/1
1 TABLET ORAL PRN
Status: DISCONTINUED | OUTPATIENT
Start: 2021-01-01 | End: 2021-01-01 | Stop reason: HOSPADM

## 2021-01-01 RX ORDER — SODIUM CHLORIDE 0.9 % (FLUSH) 0.9 %
5-40 SYRINGE (ML) INJECTION PRN
Status: DISCONTINUED | OUTPATIENT
Start: 2021-01-01 | End: 2021-01-01 | Stop reason: HOSPADM

## 2021-01-01 RX ORDER — DIPHENHYDRAMINE HYDROCHLORIDE 50 MG/ML
25 INJECTION INTRAMUSCULAR; INTRAVENOUS EVERY 6 HOURS PRN
Status: DISCONTINUED | OUTPATIENT
Start: 2021-01-01 | End: 2021-01-01 | Stop reason: HOSPADM

## 2021-01-01 RX ORDER — HYDRALAZINE HYDROCHLORIDE 20 MG/ML
5 INJECTION INTRAMUSCULAR; INTRAVENOUS EVERY 10 MIN PRN
Status: DISCONTINUED | OUTPATIENT
Start: 2021-01-01 | End: 2021-01-01 | Stop reason: HOSPADM

## 2021-01-01 RX ORDER — DEXAMETHASONE SODIUM PHOSPHATE 4 MG/ML
INJECTION, SOLUTION INTRA-ARTICULAR; INTRALESIONAL; INTRAMUSCULAR; INTRAVENOUS; SOFT TISSUE PRN
Status: DISCONTINUED | OUTPATIENT
Start: 2021-01-01 | End: 2021-01-01 | Stop reason: SDUPTHER

## 2021-01-01 RX ORDER — INSULIN GLARGINE 100 [IU]/ML
10 INJECTION, SOLUTION SUBCUTANEOUS NIGHTLY
Status: DISCONTINUED | OUTPATIENT
Start: 2021-01-01 | End: 2021-01-01

## 2021-01-01 RX ORDER — ACETAMINOPHEN 160 MG
2000 TABLET,DISINTEGRATING ORAL DAILY
Status: DISCONTINUED | OUTPATIENT
Start: 2021-01-01 | End: 2021-01-01 | Stop reason: SDUPTHER

## 2021-01-01 RX ORDER — HEPARIN SODIUM 10000 [USP'U]/100ML
980 INJECTION, SOLUTION INTRAVENOUS CONTINUOUS
Status: DISCONTINUED | OUTPATIENT
Start: 2021-01-01 | End: 2021-01-01 | Stop reason: DRUGHIGH

## 2021-01-01 RX ORDER — INSULIN GLARGINE 300 U/ML
INJECTION, SOLUTION SUBCUTANEOUS
Qty: 5 PEN | Refills: 4 | Status: SHIPPED | OUTPATIENT
Start: 2021-01-01

## 2021-01-01 RX ORDER — DILTIAZEM HYDROCHLORIDE 5 MG/ML
10 INJECTION INTRAVENOUS ONCE
Status: COMPLETED | OUTPATIENT
Start: 2021-01-01 | End: 2021-01-01

## 2021-01-01 RX ORDER — ONDANSETRON 2 MG/ML
4 INJECTION INTRAMUSCULAR; INTRAVENOUS PRN
Status: DISCONTINUED | OUTPATIENT
Start: 2021-01-01 | End: 2021-01-01 | Stop reason: HOSPADM

## 2021-01-01 RX ORDER — LEVOFLOXACIN 5 MG/ML
250 INJECTION, SOLUTION INTRAVENOUS EVERY 24 HOURS
Status: DISCONTINUED | OUTPATIENT
Start: 2021-01-01 | End: 2021-01-01 | Stop reason: ALTCHOICE

## 2021-01-01 RX ADMIN — ACETAMINOPHEN 650 MG: 325 TABLET ORAL at 20:33

## 2021-01-01 RX ADMIN — INSULIN LISPRO 2 UNITS: 100 INJECTION, SOLUTION INTRAVENOUS; SUBCUTANEOUS at 17:33

## 2021-01-01 RX ADMIN — ACETAMINOPHEN 650 MG: 325 TABLET ORAL at 21:59

## 2021-01-01 RX ADMIN — ASPIRIN 81 MG: 81 TABLET, CHEWABLE ORAL at 10:46

## 2021-01-01 RX ADMIN — FENTANYL CITRATE 50 MCG: 50 INJECTION INTRAMUSCULAR; INTRAVENOUS at 08:48

## 2021-01-01 RX ADMIN — PANTOPRAZOLE SODIUM 40 MG: 40 TABLET, DELAYED RELEASE ORAL at 06:33

## 2021-01-01 RX ADMIN — BARICITINIB 1 MG: 1 TABLET, FILM COATED ORAL at 09:54

## 2021-01-01 RX ADMIN — DOCUSATE SODIUM 50MG AND SENNOSIDES 8.6MG 1 TABLET: 8.6; 5 TABLET, FILM COATED ORAL at 20:33

## 2021-01-01 RX ADMIN — INSULIN GLARGINE 15 UNITS: 100 INJECTION, SOLUTION SUBCUTANEOUS at 20:39

## 2021-01-01 RX ADMIN — Medication 2 PUFF: at 07:45

## 2021-01-01 RX ADMIN — ROSUVASTATIN CALCIUM 40 MG: 10 TABLET, FILM COATED ORAL at 08:42

## 2021-01-01 RX ADMIN — ROSUVASTATIN CALCIUM 40 MG: 10 TABLET, FILM COATED ORAL at 09:41

## 2021-01-01 RX ADMIN — SODIUM CHLORIDE, PRESERVATIVE FREE 10 ML: 5 INJECTION INTRAVENOUS at 03:37

## 2021-01-01 RX ADMIN — TAMSULOSIN HYDROCHLORIDE 0.4 MG: 0.4 CAPSULE ORAL at 15:31

## 2021-01-01 RX ADMIN — APIXABAN 2.5 MG: 2.5 TABLET, FILM COATED ORAL at 21:56

## 2021-01-01 RX ADMIN — ROCURONIUM BROMIDE 15 MG: 10 INJECTION, SOLUTION INTRAVENOUS at 10:38

## 2021-01-01 RX ADMIN — APIXABAN 2.5 MG: 2.5 TABLET, FILM COATED ORAL at 20:39

## 2021-01-01 RX ADMIN — SODIUM CHLORIDE, PRESERVATIVE FREE 10 ML: 5 INJECTION INTRAVENOUS at 09:42

## 2021-01-01 RX ADMIN — TAMSULOSIN HYDROCHLORIDE 0.4 MG: 0.4 CAPSULE ORAL at 08:42

## 2021-01-01 RX ADMIN — SODIUM BICARBONATE: 84 INJECTION, SOLUTION INTRAVENOUS at 18:43

## 2021-01-01 RX ADMIN — ROSUVASTATIN CALCIUM 10 MG: 10 TABLET, FILM COATED ORAL at 21:59

## 2021-01-01 RX ADMIN — SODIUM CHLORIDE, PRESERVATIVE FREE 10 ML: 5 INJECTION INTRAVENOUS at 10:16

## 2021-01-01 RX ADMIN — METHYLPREDNISOLONE SODIUM SUCCINATE 40 MG: 40 INJECTION, POWDER, FOR SOLUTION INTRAMUSCULAR; INTRAVENOUS at 03:19

## 2021-01-01 RX ADMIN — INSULIN LISPRO 9 UNITS: 100 INJECTION, SOLUTION INTRAVENOUS; SUBCUTANEOUS at 12:53

## 2021-01-01 RX ADMIN — INSULIN GLARGINE 35 UNITS: 100 INJECTION, SOLUTION SUBCUTANEOUS at 22:37

## 2021-01-01 RX ADMIN — APIXABAN 2.5 MG: 2.5 TABLET, FILM COATED ORAL at 09:51

## 2021-01-01 RX ADMIN — HEPARIN SODIUM 5000 UNITS: 5000 INJECTION INTRAVENOUS; SUBCUTANEOUS at 06:17

## 2021-01-01 RX ADMIN — METHYLPREDNISOLONE SODIUM SUCCINATE 40 MG: 40 INJECTION, POWDER, FOR SOLUTION INTRAMUSCULAR; INTRAVENOUS at 16:53

## 2021-01-01 RX ADMIN — Medication 2000 UNITS: at 08:28

## 2021-01-01 RX ADMIN — ROSUVASTATIN CALCIUM 40 MG: 10 TABLET, FILM COATED ORAL at 10:46

## 2021-01-01 RX ADMIN — INSULIN LISPRO 3 UNITS: 100 INJECTION, SOLUTION INTRAVENOUS; SUBCUTANEOUS at 21:54

## 2021-01-01 RX ADMIN — MORPHINE SULFATE 4 MG: 4 INJECTION INTRAVENOUS at 01:23

## 2021-01-01 RX ADMIN — SUGAMMADEX 200 MG: 100 INJECTION, SOLUTION INTRAVENOUS at 11:30

## 2021-01-01 RX ADMIN — SODIUM CHLORIDE, PRESERVATIVE FREE 10 ML: 5 INJECTION INTRAVENOUS at 12:05

## 2021-01-01 RX ADMIN — ROSUVASTATIN CALCIUM 40 MG: 10 TABLET, FILM COATED ORAL at 10:26

## 2021-01-01 RX ADMIN — INSULIN LISPRO 3 UNITS: 100 INJECTION, SOLUTION INTRAVENOUS; SUBCUTANEOUS at 17:49

## 2021-01-01 RX ADMIN — APIXABAN 2.5 MG: 2.5 TABLET, FILM COATED ORAL at 20:57

## 2021-01-01 RX ADMIN — INSULIN LISPRO 3 UNITS: 100 INJECTION, SOLUTION INTRAVENOUS; SUBCUTANEOUS at 16:56

## 2021-01-01 RX ADMIN — METHYLPREDNISOLONE SODIUM SUCCINATE 40 MG: 40 INJECTION, POWDER, FOR SOLUTION INTRAMUSCULAR; INTRAVENOUS at 14:09

## 2021-01-01 RX ADMIN — INSULIN GLARGINE 20 UNITS: 100 INJECTION, SOLUTION SUBCUTANEOUS at 22:50

## 2021-01-01 RX ADMIN — INSULIN LISPRO 10 UNITS: 100 INJECTION, SOLUTION INTRAVENOUS; SUBCUTANEOUS at 12:09

## 2021-01-01 RX ADMIN — PANTOPRAZOLE SODIUM 40 MG: 40 TABLET, DELAYED RELEASE ORAL at 06:04

## 2021-01-01 RX ADMIN — ACETAMINOPHEN 650 MG: 325 TABLET ORAL at 00:53

## 2021-01-01 RX ADMIN — SODIUM CHLORIDE, PRESERVATIVE FREE 10 ML: 5 INJECTION INTRAVENOUS at 03:19

## 2021-01-01 RX ADMIN — INSULIN GLARGINE 20 UNITS: 100 INJECTION, SOLUTION SUBCUTANEOUS at 12:02

## 2021-01-01 RX ADMIN — ASPIRIN 81 MG: 81 TABLET, CHEWABLE ORAL at 09:32

## 2021-01-01 RX ADMIN — SODIUM ZIRCONIUM CYCLOSILICATE 10 G: 5 POWDER, FOR SUSPENSION ORAL at 21:37

## 2021-01-01 RX ADMIN — Medication 2000 UNITS: at 10:26

## 2021-01-01 RX ADMIN — PANTOPRAZOLE SODIUM 40 MG: 40 TABLET, DELAYED RELEASE ORAL at 05:37

## 2021-01-01 RX ADMIN — ESCITALOPRAM OXALATE 10 MG: 10 TABLET ORAL at 15:31

## 2021-01-01 RX ADMIN — DIGOXIN 0.06 MG: 125 TABLET ORAL at 12:15

## 2021-01-01 RX ADMIN — INSULIN GLARGINE 40 UNITS: 100 INJECTION, SOLUTION SUBCUTANEOUS at 21:29

## 2021-01-01 RX ADMIN — INSULIN LISPRO 2 UNITS: 100 INJECTION, SOLUTION INTRAVENOUS; SUBCUTANEOUS at 12:26

## 2021-01-01 RX ADMIN — ONDANSETRON 4 MG: 2 INJECTION, SOLUTION INTRAMUSCULAR; INTRAVENOUS at 11:28

## 2021-01-01 RX ADMIN — SODIUM CHLORIDE, PRESERVATIVE FREE 10 ML: 5 INJECTION INTRAVENOUS at 21:03

## 2021-01-01 RX ADMIN — TAMSULOSIN HYDROCHLORIDE 0.4 MG: 0.4 CAPSULE ORAL at 09:39

## 2021-01-01 RX ADMIN — INSULIN LISPRO 9 UNITS: 100 INJECTION, SOLUTION INTRAVENOUS; SUBCUTANEOUS at 16:37

## 2021-01-01 RX ADMIN — METHYLPREDNISOLONE SODIUM SUCCINATE 40 MG: 40 INJECTION, POWDER, FOR SOLUTION INTRAMUSCULAR; INTRAVENOUS at 09:54

## 2021-01-01 RX ADMIN — INSULIN GLARGINE 40 UNITS: 100 INJECTION, SOLUTION SUBCUTANEOUS at 21:56

## 2021-01-01 RX ADMIN — SODIUM CHLORIDE, PRESERVATIVE FREE 10 ML: 5 INJECTION INTRAVENOUS at 04:06

## 2021-01-01 RX ADMIN — Medication 5 MG: at 21:39

## 2021-01-01 RX ADMIN — ASPIRIN 81 MG: 81 TABLET, CHEWABLE ORAL at 10:09

## 2021-01-01 RX ADMIN — INSULIN GLARGINE 10 UNITS: 100 INJECTION, SOLUTION SUBCUTANEOUS at 21:53

## 2021-01-01 RX ADMIN — INSULIN LISPRO 4 UNITS: 100 INJECTION, SOLUTION INTRAVENOUS; SUBCUTANEOUS at 16:36

## 2021-01-01 RX ADMIN — PIPERACILLIN AND TAZOBACTAM 3375 MG: 3; .375 INJECTION, POWDER, LYOPHILIZED, FOR SOLUTION INTRAVENOUS at 15:58

## 2021-01-01 RX ADMIN — INSULIN LISPRO 3 UNITS: 100 INJECTION, SOLUTION INTRAVENOUS; SUBCUTANEOUS at 21:06

## 2021-01-01 RX ADMIN — INSULIN LISPRO 3 UNITS: 100 INJECTION, SOLUTION INTRAVENOUS; SUBCUTANEOUS at 09:45

## 2021-01-01 RX ADMIN — SODIUM BICARBONATE: 84 INJECTION, SOLUTION INTRAVENOUS at 12:00

## 2021-01-01 RX ADMIN — DILTIAZEM HYDROCHLORIDE 5 MG/HR: 5 INJECTION INTRAVENOUS at 05:00

## 2021-01-01 RX ADMIN — INSULIN LISPRO 6 UNITS: 100 INJECTION, SOLUTION INTRAVENOUS; SUBCUTANEOUS at 09:42

## 2021-01-01 RX ADMIN — HEPARIN SODIUM 5000 UNITS: 5000 INJECTION INTRAVENOUS; SUBCUTANEOUS at 20:53

## 2021-01-01 RX ADMIN — INSULIN LISPRO 12 UNITS: 100 INJECTION, SOLUTION INTRAVENOUS; SUBCUTANEOUS at 12:34

## 2021-01-01 RX ADMIN — INSULIN LISPRO 6 UNITS: 100 INJECTION, SOLUTION INTRAVENOUS; SUBCUTANEOUS at 08:27

## 2021-01-01 RX ADMIN — TAMSULOSIN HYDROCHLORIDE 0.4 MG: 0.4 CAPSULE ORAL at 10:26

## 2021-01-01 RX ADMIN — METHYLPREDNISOLONE SODIUM SUCCINATE 40 MG: 40 INJECTION, POWDER, FOR SOLUTION INTRAMUSCULAR; INTRAVENOUS at 10:00

## 2021-01-01 RX ADMIN — ENOXAPARIN SODIUM 30 MG: 100 INJECTION SUBCUTANEOUS at 20:33

## 2021-01-01 RX ADMIN — INSULIN LISPRO 9 UNITS: 100 INJECTION, SOLUTION INTRAVENOUS; SUBCUTANEOUS at 12:32

## 2021-01-01 RX ADMIN — ACETAMINOPHEN 650 MG: 325 TABLET ORAL at 03:50

## 2021-01-01 RX ADMIN — INSULIN LISPRO 1 UNITS: 100 INJECTION, SOLUTION INTRAVENOUS; SUBCUTANEOUS at 16:12

## 2021-01-01 RX ADMIN — ACETAMINOPHEN 650 MG: 325 TABLET ORAL at 15:31

## 2021-01-01 RX ADMIN — SODIUM ZIRCONIUM CYCLOSILICATE 10 G: 5 POWDER, FOR SUSPENSION ORAL at 10:16

## 2021-01-01 RX ADMIN — DIGOXIN 0.06 MG: 125 TABLET ORAL at 10:09

## 2021-01-01 RX ADMIN — CEFAZOLIN SODIUM 1000 MG: 1 INJECTION, POWDER, FOR SOLUTION INTRAMUSCULAR; INTRAVENOUS at 08:29

## 2021-01-01 RX ADMIN — INSULIN GLARGINE 15 UNITS: 100 INJECTION, SOLUTION SUBCUTANEOUS at 10:34

## 2021-01-01 RX ADMIN — ROSUVASTATIN CALCIUM 40 MG: 10 TABLET, FILM COATED ORAL at 09:37

## 2021-01-01 RX ADMIN — DILTIAZEM HYDROCHLORIDE 5 MG/HR: 5 INJECTION INTRAVENOUS at 18:23

## 2021-01-01 RX ADMIN — BARICITINIB 1 MG: 1 TABLET, FILM COATED ORAL at 09:16

## 2021-01-01 RX ADMIN — BARICITINIB 1 MG: 1 TABLET, FILM COATED ORAL at 20:10

## 2021-01-01 RX ADMIN — INSULIN LISPRO 1 UNITS: 100 INJECTION, SOLUTION INTRAVENOUS; SUBCUTANEOUS at 12:52

## 2021-01-01 RX ADMIN — SODIUM CHLORIDE, PRESERVATIVE FREE 10 ML: 5 INJECTION INTRAVENOUS at 22:04

## 2021-01-01 RX ADMIN — METHYLPREDNISOLONE SODIUM SUCCINATE 40 MG: 40 INJECTION, POWDER, FOR SOLUTION INTRAMUSCULAR; INTRAVENOUS at 05:48

## 2021-01-01 RX ADMIN — BARICITINIB 1 MG: 1 TABLET, FILM COATED ORAL at 10:16

## 2021-01-01 RX ADMIN — APIXABAN 2.5 MG: 2.5 TABLET, FILM COATED ORAL at 22:36

## 2021-01-01 RX ADMIN — INSULIN LISPRO 6 UNITS: 100 INJECTION, SOLUTION INTRAVENOUS; SUBCUTANEOUS at 12:23

## 2021-01-01 RX ADMIN — ROSUVASTATIN CALCIUM 40 MG: 10 TABLET, FILM COATED ORAL at 10:15

## 2021-01-01 RX ADMIN — APIXABAN 2.5 MG: 2.5 TABLET, FILM COATED ORAL at 20:17

## 2021-01-01 RX ADMIN — SODIUM CHLORIDE, PRESERVATIVE FREE 10 ML: 5 INJECTION INTRAVENOUS at 10:26

## 2021-01-01 RX ADMIN — INSULIN LISPRO 6 UNITS: 100 INJECTION, SOLUTION INTRAVENOUS; SUBCUTANEOUS at 08:34

## 2021-01-01 RX ADMIN — Medication 2 PUFF: at 08:39

## 2021-01-01 RX ADMIN — SODIUM CHLORIDE, PRESERVATIVE FREE 10 ML: 5 INJECTION INTRAVENOUS at 21:56

## 2021-01-01 RX ADMIN — MORPHINE SULFATE 4 MG: 4 INJECTION INTRAVENOUS at 04:13

## 2021-01-01 RX ADMIN — DILTIAZEM HYDROCHLORIDE 120 MG: 120 CAPSULE, COATED, EXTENDED RELEASE ORAL at 09:41

## 2021-01-01 RX ADMIN — ESCITALOPRAM OXALATE 10 MG: 10 TABLET ORAL at 09:41

## 2021-01-01 RX ADMIN — HEPARIN SODIUM 4370 UNITS: 1000 INJECTION INTRAVENOUS; SUBCUTANEOUS at 11:57

## 2021-01-01 RX ADMIN — TAMSULOSIN HYDROCHLORIDE 0.4 MG: 0.4 CAPSULE ORAL at 08:28

## 2021-01-01 RX ADMIN — ACETAMINOPHEN 650 MG: 325 TABLET ORAL at 01:22

## 2021-01-01 RX ADMIN — ESCITALOPRAM OXALATE 10 MG: 10 TABLET ORAL at 10:26

## 2021-01-01 RX ADMIN — DILTIAZEM HYDROCHLORIDE 10 MG: 5 INJECTION INTRAVENOUS at 18:18

## 2021-01-01 RX ADMIN — ASPIRIN 81 MG: 81 TABLET, CHEWABLE ORAL at 09:51

## 2021-01-01 RX ADMIN — TAMSULOSIN HYDROCHLORIDE 0.4 MG: 0.4 CAPSULE ORAL at 09:41

## 2021-01-01 RX ADMIN — LORAZEPAM 1 MG: 2 INJECTION INTRAMUSCULAR; INTRAVENOUS at 02:56

## 2021-01-01 RX ADMIN — DILTIAZEM HYDROCHLORIDE 120 MG: 120 CAPSULE, COATED, EXTENDED RELEASE ORAL at 10:15

## 2021-01-01 RX ADMIN — DILTIAZEM HYDROCHLORIDE 120 MG: 120 CAPSULE, COATED, EXTENDED RELEASE ORAL at 11:29

## 2021-01-01 RX ADMIN — TAMSULOSIN HYDROCHLORIDE 0.4 MG: 0.4 CAPSULE ORAL at 09:17

## 2021-01-01 RX ADMIN — BARICITINIB 1 MG: 1 TABLET, FILM COATED ORAL at 08:41

## 2021-01-01 RX ADMIN — ESCITALOPRAM OXALATE 10 MG: 10 TABLET ORAL at 09:52

## 2021-01-01 RX ADMIN — PANTOPRAZOLE SODIUM 40 MG: 40 INJECTION, POWDER, FOR SOLUTION INTRAVENOUS at 09:54

## 2021-01-01 RX ADMIN — INSULIN LISPRO 5 UNITS: 100 INJECTION, SOLUTION INTRAVENOUS; SUBCUTANEOUS at 22:36

## 2021-01-01 RX ADMIN — INSULIN GLARGINE 20 UNITS: 100 INJECTION, SOLUTION SUBCUTANEOUS at 23:51

## 2021-01-01 RX ADMIN — DOCUSATE SODIUM 50MG AND SENNOSIDES 8.6MG 1 TABLET: 8.6; 5 TABLET, FILM COATED ORAL at 21:59

## 2021-01-01 RX ADMIN — APIXABAN 2.5 MG: 2.5 TABLET, FILM COATED ORAL at 10:16

## 2021-01-01 RX ADMIN — SODIUM CHLORIDE, PRESERVATIVE FREE 10 ML: 5 INJECTION INTRAVENOUS at 21:48

## 2021-01-01 RX ADMIN — INSULIN LISPRO 2 UNITS: 100 INJECTION, SOLUTION INTRAVENOUS; SUBCUTANEOUS at 21:29

## 2021-01-01 RX ADMIN — SODIUM CHLORIDE, SODIUM LACTATE, POTASSIUM CHLORIDE, AND CALCIUM CHLORIDE: .6; .31; .03; .02 INJECTION, SOLUTION INTRAVENOUS at 07:56

## 2021-01-01 RX ADMIN — INSULIN LISPRO 5 UNITS: 100 INJECTION, SOLUTION INTRAVENOUS; SUBCUTANEOUS at 21:44

## 2021-01-01 RX ADMIN — INSULIN GLARGINE 35 UNITS: 100 INJECTION, SOLUTION SUBCUTANEOUS at 08:27

## 2021-01-01 RX ADMIN — ASPIRIN 81 MG: 81 TABLET, CHEWABLE ORAL at 09:53

## 2021-01-01 RX ADMIN — METHYLPREDNISOLONE SODIUM SUCCINATE 40 MG: 40 INJECTION, POWDER, FOR SOLUTION INTRAMUSCULAR; INTRAVENOUS at 04:35

## 2021-01-01 RX ADMIN — MORPHINE SULFATE 4 MG: 4 INJECTION INTRAVENOUS at 20:51

## 2021-01-01 RX ADMIN — INSULIN LISPRO 3 UNITS: 100 INJECTION, SOLUTION INTRAVENOUS; SUBCUTANEOUS at 00:32

## 2021-01-01 RX ADMIN — INSULIN LISPRO 6 UNITS: 100 INJECTION, SOLUTION INTRAVENOUS; SUBCUTANEOUS at 17:57

## 2021-01-01 RX ADMIN — Medication 2 PUFF: at 19:58

## 2021-01-01 RX ADMIN — Medication 2000 UNITS: at 09:53

## 2021-01-01 RX ADMIN — ESCITALOPRAM OXALATE 10 MG: 10 TABLET ORAL at 09:39

## 2021-01-01 RX ADMIN — APIXABAN 2.5 MG: 2.5 TABLET, FILM COATED ORAL at 09:32

## 2021-01-01 RX ADMIN — APIXABAN 2.5 MG: 2.5 TABLET, FILM COATED ORAL at 09:16

## 2021-01-01 RX ADMIN — INSULIN LISPRO 6 UNITS: 100 INJECTION, SOLUTION INTRAVENOUS; SUBCUTANEOUS at 13:01

## 2021-01-01 RX ADMIN — ROSUVASTATIN CALCIUM 40 MG: 10 TABLET, FILM COATED ORAL at 10:10

## 2021-01-01 RX ADMIN — Medication 2 PUFF: at 20:22

## 2021-01-01 RX ADMIN — INSULIN LISPRO 2 UNITS: 100 INJECTION, SOLUTION INTRAVENOUS; SUBCUTANEOUS at 08:55

## 2021-01-01 RX ADMIN — HEPARIN SODIUM 5000 UNITS: 5000 INJECTION INTRAVENOUS; SUBCUTANEOUS at 22:00

## 2021-01-01 RX ADMIN — SODIUM CHLORIDE, PRESERVATIVE FREE 10 ML: 5 INJECTION INTRAVENOUS at 10:11

## 2021-01-01 RX ADMIN — PANTOPRAZOLE SODIUM 40 MG: 40 INJECTION, POWDER, FOR SOLUTION INTRAVENOUS at 12:04

## 2021-01-01 RX ADMIN — ASPIRIN 81 MG: 81 TABLET, CHEWABLE ORAL at 09:42

## 2021-01-01 RX ADMIN — MORPHINE SULFATE 4 MG: 4 INJECTION INTRAVENOUS at 02:56

## 2021-01-01 RX ADMIN — LORAZEPAM 1 MG: 2 INJECTION INTRAMUSCULAR; INTRAVENOUS at 20:51

## 2021-01-01 RX ADMIN — SODIUM CHLORIDE, PRESERVATIVE FREE 10 ML: 5 INJECTION INTRAVENOUS at 08:47

## 2021-01-01 RX ADMIN — APIXABAN 2.5 MG: 2.5 TABLET, FILM COATED ORAL at 09:37

## 2021-01-01 RX ADMIN — SODIUM CHLORIDE, PRESERVATIVE FREE 10 ML: 5 INJECTION INTRAVENOUS at 14:09

## 2021-01-01 RX ADMIN — SODIUM CHLORIDE, PRESERVATIVE FREE 10 ML: 5 INJECTION INTRAVENOUS at 22:46

## 2021-01-01 RX ADMIN — INSULIN GLARGINE 20 UNITS: 100 INJECTION, SOLUTION SUBCUTANEOUS at 21:44

## 2021-01-01 RX ADMIN — DILTIAZEM HYDROCHLORIDE 10 MG/HR: 5 INJECTION INTRAVENOUS at 20:30

## 2021-01-01 RX ADMIN — ASPIRIN 81 MG: 81 TABLET, CHEWABLE ORAL at 08:28

## 2021-01-01 RX ADMIN — DILTIAZEM HYDROCHLORIDE 120 MG: 120 CAPSULE, COATED, EXTENDED RELEASE ORAL at 09:32

## 2021-01-01 RX ADMIN — DILTIAZEM HYDROCHLORIDE 120 MG: 120 CAPSULE, COATED, EXTENDED RELEASE ORAL at 08:42

## 2021-01-01 RX ADMIN — ASPIRIN 81 MG: 81 TABLET, CHEWABLE ORAL at 09:38

## 2021-01-01 RX ADMIN — MORPHINE SULFATE 4 MG: 4 INJECTION INTRAVENOUS at 09:47

## 2021-01-01 RX ADMIN — TAMSULOSIN HYDROCHLORIDE 0.4 MG: 0.4 CAPSULE ORAL at 09:52

## 2021-01-01 RX ADMIN — BARICITINIB 1 MG: 1 TABLET, FILM COATED ORAL at 11:34

## 2021-01-01 RX ADMIN — METHYLPREDNISOLONE SODIUM SUCCINATE 40 MG: 40 INJECTION, POWDER, FOR SOLUTION INTRAMUSCULAR; INTRAVENOUS at 03:14

## 2021-01-01 RX ADMIN — PROPOFOL 40 MG: 10 INJECTION, EMULSION INTRAVENOUS at 10:15

## 2021-01-01 RX ADMIN — INSULIN LISPRO 6 UNITS: 100 INJECTION, SOLUTION INTRAVENOUS; SUBCUTANEOUS at 12:47

## 2021-01-01 RX ADMIN — HEPARIN SODIUM 5000 UNITS: 5000 INJECTION INTRAVENOUS; SUBCUTANEOUS at 04:36

## 2021-01-01 RX ADMIN — MORPHINE SULFATE 4 MG: 4 INJECTION INTRAVENOUS at 13:38

## 2021-01-01 RX ADMIN — PANTOPRAZOLE SODIUM 40 MG: 40 TABLET, DELAYED RELEASE ORAL at 05:41

## 2021-01-01 RX ADMIN — Medication 2 PUFF: at 20:30

## 2021-01-01 RX ADMIN — ROSUVASTATIN CALCIUM 10 MG: 10 TABLET, FILM COATED ORAL at 20:33

## 2021-01-01 RX ADMIN — PANTOPRAZOLE SODIUM 40 MG: 40 TABLET, DELAYED RELEASE ORAL at 05:20

## 2021-01-01 RX ADMIN — MORPHINE SULFATE 4 MG: 4 INJECTION INTRAVENOUS at 05:20

## 2021-01-01 RX ADMIN — SODIUM CHLORIDE, PRESERVATIVE FREE 10 ML: 5 INJECTION INTRAVENOUS at 09:17

## 2021-01-01 RX ADMIN — DOCUSATE SODIUM 50MG AND SENNOSIDES 8.6MG 1 TABLET: 8.6; 5 TABLET, FILM COATED ORAL at 08:25

## 2021-01-01 RX ADMIN — ESCITALOPRAM OXALATE 10 MG: 10 TABLET ORAL at 09:38

## 2021-01-01 RX ADMIN — INSULIN GLARGINE 20 UNITS: 100 INJECTION, SOLUTION SUBCUTANEOUS at 21:58

## 2021-01-01 RX ADMIN — ESCITALOPRAM OXALATE 10 MG: 10 TABLET ORAL at 10:16

## 2021-01-01 RX ADMIN — LIDOCAINE HYDROCHLORIDE 80 MG: 20 INJECTION, SOLUTION EPIDURAL; INFILTRATION; INTRACAUDAL; PERINEURAL at 08:48

## 2021-01-01 RX ADMIN — SODIUM CHLORIDE, PRESERVATIVE FREE 10 ML: 5 INJECTION INTRAVENOUS at 22:37

## 2021-01-01 RX ADMIN — METHYLPREDNISOLONE SODIUM SUCCINATE 40 MG: 40 INJECTION, POWDER, FOR SOLUTION INTRAMUSCULAR; INTRAVENOUS at 16:03

## 2021-01-01 RX ADMIN — INSULIN LISPRO 6 UNITS: 100 INJECTION, SOLUTION INTRAVENOUS; SUBCUTANEOUS at 17:14

## 2021-01-01 RX ADMIN — INSULIN LISPRO 2 UNITS: 100 INJECTION, SOLUTION INTRAVENOUS; SUBCUTANEOUS at 20:57

## 2021-01-01 RX ADMIN — METHYLPREDNISOLONE SODIUM SUCCINATE 40 MG: 40 INJECTION, POWDER, FOR SOLUTION INTRAMUSCULAR; INTRAVENOUS at 16:36

## 2021-01-01 RX ADMIN — ESCITALOPRAM OXALATE 10 MG: 10 TABLET ORAL at 08:41

## 2021-01-01 RX ADMIN — HEPARIN SODIUM 5000 UNITS: 5000 INJECTION INTRAVENOUS; SUBCUTANEOUS at 15:52

## 2021-01-01 RX ADMIN — TAMSULOSIN HYDROCHLORIDE 0.4 MG: 0.4 CAPSULE ORAL at 10:46

## 2021-01-01 RX ADMIN — SODIUM CHLORIDE, PRESERVATIVE FREE 10 ML: 5 INJECTION INTRAVENOUS at 20:50

## 2021-01-01 RX ADMIN — TAMSULOSIN HYDROCHLORIDE 0.4 MG: 0.4 CAPSULE ORAL at 10:16

## 2021-01-01 RX ADMIN — DILTIAZEM HYDROCHLORIDE 120 MG: 120 CAPSULE, COATED, EXTENDED RELEASE ORAL at 09:50

## 2021-01-01 RX ADMIN — FENTANYL CITRATE 50 MCG: 50 INJECTION INTRAMUSCULAR; INTRAVENOUS at 09:00

## 2021-01-01 RX ADMIN — Medication 2 PUFF: at 08:01

## 2021-01-01 RX ADMIN — INSULIN LISPRO 2 UNITS: 100 INJECTION, SOLUTION INTRAVENOUS; SUBCUTANEOUS at 12:20

## 2021-01-01 RX ADMIN — Medication 2 PUFF: at 12:08

## 2021-01-01 RX ADMIN — HEPARIN SODIUM 5000 UNITS: 5000 INJECTION INTRAVENOUS; SUBCUTANEOUS at 21:44

## 2021-01-01 RX ADMIN — INSULIN LISPRO 3 UNITS: 100 INJECTION, SOLUTION INTRAVENOUS; SUBCUTANEOUS at 17:44

## 2021-01-01 RX ADMIN — INSULIN LISPRO 12 UNITS: 100 INJECTION, SOLUTION INTRAVENOUS; SUBCUTANEOUS at 16:55

## 2021-01-01 RX ADMIN — HEPARIN SODIUM 5000 UNITS: 5000 INJECTION INTRAVENOUS; SUBCUTANEOUS at 05:49

## 2021-01-01 RX ADMIN — METHYLPREDNISOLONE SODIUM SUCCINATE 40 MG: 40 INJECTION, POWDER, FOR SOLUTION INTRAMUSCULAR; INTRAVENOUS at 03:17

## 2021-01-01 RX ADMIN — INSULIN LISPRO 3 UNITS: 100 INJECTION, SOLUTION INTRAVENOUS; SUBCUTANEOUS at 09:48

## 2021-01-01 RX ADMIN — INSULIN LISPRO 3 UNITS: 100 INJECTION, SOLUTION INTRAVENOUS; SUBCUTANEOUS at 10:48

## 2021-01-01 RX ADMIN — ACETAMINOPHEN 650 MG: 325 TABLET ORAL at 03:24

## 2021-01-01 RX ADMIN — CEFAZOLIN SODIUM 1000 MG: 1 INJECTION, POWDER, FOR SOLUTION INTRAMUSCULAR; INTRAVENOUS at 20:32

## 2021-01-01 RX ADMIN — METHYLPREDNISOLONE SODIUM SUCCINATE 40 MG: 40 INJECTION, POWDER, FOR SOLUTION INTRAMUSCULAR; INTRAVENOUS at 17:08

## 2021-01-01 RX ADMIN — SODIUM CHLORIDE, PRESERVATIVE FREE 10 ML: 5 INJECTION INTRAVENOUS at 20:18

## 2021-01-01 RX ADMIN — TAMSULOSIN HYDROCHLORIDE 0.4 MG: 0.4 CAPSULE ORAL at 10:09

## 2021-01-01 RX ADMIN — ESCITALOPRAM OXALATE 10 MG: 10 TABLET ORAL at 08:47

## 2021-01-01 RX ADMIN — ROSUVASTATIN CALCIUM 40 MG: 10 TABLET, FILM COATED ORAL at 09:16

## 2021-01-01 RX ADMIN — TAMSULOSIN HYDROCHLORIDE 0.4 MG: 0.4 CAPSULE ORAL at 08:47

## 2021-01-01 RX ADMIN — INSULIN LISPRO 6 UNITS: 100 INJECTION, SOLUTION INTRAVENOUS; SUBCUTANEOUS at 20:40

## 2021-01-01 RX ADMIN — PANTOPRAZOLE SODIUM 40 MG: 40 TABLET, DELAYED RELEASE ORAL at 05:34

## 2021-01-01 RX ADMIN — INSULIN LISPRO 6 UNITS: 100 INJECTION, SOLUTION INTRAVENOUS; SUBCUTANEOUS at 17:06

## 2021-01-01 RX ADMIN — PANTOPRAZOLE SODIUM 40 MG: 40 TABLET, DELAYED RELEASE ORAL at 06:55

## 2021-01-01 RX ADMIN — APIXABAN 2.5 MG: 2.5 TABLET, FILM COATED ORAL at 09:41

## 2021-01-01 RX ADMIN — ACETAMINOPHEN 650 MG: 325 TABLET ORAL at 04:42

## 2021-01-01 RX ADMIN — METHYLPREDNISOLONE SODIUM SUCCINATE 40 MG: 40 INJECTION, POWDER, FOR SOLUTION INTRAMUSCULAR; INTRAVENOUS at 06:03

## 2021-01-01 RX ADMIN — INSULIN GLARGINE 20 UNITS: 100 INJECTION, SOLUTION SUBCUTANEOUS at 00:31

## 2021-01-01 RX ADMIN — INSULIN LISPRO 15 UNITS: 100 INJECTION, SOLUTION INTRAVENOUS; SUBCUTANEOUS at 12:39

## 2021-01-01 RX ADMIN — METHYLPREDNISOLONE SODIUM SUCCINATE 40 MG: 40 INJECTION, POWDER, FOR SOLUTION INTRAMUSCULAR; INTRAVENOUS at 14:23

## 2021-01-01 RX ADMIN — LORAZEPAM 1 MG: 2 INJECTION INTRAMUSCULAR; INTRAVENOUS at 06:26

## 2021-01-01 RX ADMIN — GLYCOPYRROLATE 0.2 MG: 0.2 INJECTION, SOLUTION INTRAMUSCULAR; INTRAVENOUS at 10:03

## 2021-01-01 RX ADMIN — APIXABAN 2.5 MG: 2.5 TABLET, FILM COATED ORAL at 10:10

## 2021-01-01 RX ADMIN — INSULIN LISPRO 15 UNITS: 100 INJECTION, SOLUTION INTRAVENOUS; SUBCUTANEOUS at 11:33

## 2021-01-01 RX ADMIN — METHYLPREDNISOLONE SODIUM SUCCINATE 40 MG: 40 INJECTION, POWDER, FOR SOLUTION INTRAMUSCULAR; INTRAVENOUS at 04:01

## 2021-01-01 RX ADMIN — HEPARIN SODIUM 5000 UNITS: 5000 INJECTION INTRAVENOUS; SUBCUTANEOUS at 14:23

## 2021-01-01 RX ADMIN — Medication 2 PUFF: at 19:39

## 2021-01-01 RX ADMIN — SODIUM CHLORIDE, PRESERVATIVE FREE 10 ML: 5 INJECTION INTRAVENOUS at 09:39

## 2021-01-01 RX ADMIN — INSULIN GLARGINE 15 UNITS: 100 INJECTION, SOLUTION SUBCUTANEOUS at 21:06

## 2021-01-01 RX ADMIN — ASPIRIN 81 MG: 81 TABLET, CHEWABLE ORAL at 09:41

## 2021-01-01 RX ADMIN — Medication 2 PUFF: at 19:40

## 2021-01-01 RX ADMIN — APIXABAN 2.5 MG: 2.5 TABLET, FILM COATED ORAL at 21:29

## 2021-01-01 RX ADMIN — ROCURONIUM BROMIDE 60 MG: 10 INJECTION, SOLUTION INTRAVENOUS at 08:48

## 2021-01-01 RX ADMIN — Medication 2 PUFF: at 08:07

## 2021-01-01 RX ADMIN — ROSUVASTATIN CALCIUM 40 MG: 10 TABLET, FILM COATED ORAL at 08:47

## 2021-01-01 RX ADMIN — LORAZEPAM 1 MG: 2 INJECTION INTRAMUSCULAR; INTRAVENOUS at 05:20

## 2021-01-01 RX ADMIN — ESCITALOPRAM OXALATE 10 MG: 10 TABLET ORAL at 09:50

## 2021-01-01 RX ADMIN — FENTANYL CITRATE 25 MCG: 50 INJECTION INTRAMUSCULAR; INTRAVENOUS at 10:36

## 2021-01-01 RX ADMIN — HEPARIN SODIUM 5000 UNITS: 5000 INJECTION INTRAVENOUS; SUBCUTANEOUS at 21:14

## 2021-01-01 RX ADMIN — ESCITALOPRAM OXALATE 10 MG: 10 TABLET ORAL at 08:28

## 2021-01-01 RX ADMIN — SODIUM CHLORIDE, PRESERVATIVE FREE 10 ML: 5 INJECTION INTRAVENOUS at 20:38

## 2021-01-01 RX ADMIN — INSULIN LISPRO 4 UNITS: 100 INJECTION, SOLUTION INTRAVENOUS; SUBCUTANEOUS at 09:54

## 2021-01-01 RX ADMIN — INSULIN LISPRO 9 UNITS: 100 INJECTION, SOLUTION INTRAVENOUS; SUBCUTANEOUS at 17:34

## 2021-01-01 RX ADMIN — INSULIN GLARGINE 25 UNITS: 100 INJECTION, SOLUTION SUBCUTANEOUS at 22:39

## 2021-01-01 RX ADMIN — LORAZEPAM 1 MG: 2 INJECTION INTRAMUSCULAR; INTRAVENOUS at 09:47

## 2021-01-01 RX ADMIN — ESCITALOPRAM OXALATE 10 MG: 10 TABLET ORAL at 08:25

## 2021-01-01 RX ADMIN — MORPHINE SULFATE 4 MG: 4 INJECTION INTRAVENOUS at 06:25

## 2021-01-01 RX ADMIN — ROSUVASTATIN CALCIUM 40 MG: 10 TABLET, FILM COATED ORAL at 09:42

## 2021-01-01 RX ADMIN — METHYLPREDNISOLONE SODIUM SUCCINATE 40 MG: 40 INJECTION, POWDER, FOR SOLUTION INTRAMUSCULAR; INTRAVENOUS at 15:35

## 2021-01-01 RX ADMIN — PROPOFOL 140 MG: 10 INJECTION, EMULSION INTRAVENOUS at 08:48

## 2021-01-01 RX ADMIN — PROPOFOL 40 MG: 10 INJECTION, EMULSION INTRAVENOUS at 10:20

## 2021-01-01 RX ADMIN — APIXABAN 2.5 MG: 2.5 TABLET, FILM COATED ORAL at 11:28

## 2021-01-01 RX ADMIN — INSULIN LISPRO 3 UNITS: 100 INJECTION, SOLUTION INTRAVENOUS; SUBCUTANEOUS at 18:24

## 2021-01-01 RX ADMIN — SODIUM CHLORIDE, PRESERVATIVE FREE 10 ML: 5 INJECTION INTRAVENOUS at 21:39

## 2021-01-01 RX ADMIN — Medication 5 MG: at 01:22

## 2021-01-01 RX ADMIN — INSULIN GLARGINE 20 UNITS: 100 INJECTION, SOLUTION SUBCUTANEOUS at 21:14

## 2021-01-01 RX ADMIN — SODIUM CHLORIDE, PRESERVATIVE FREE 10 ML: 5 INJECTION INTRAVENOUS at 16:54

## 2021-01-01 RX ADMIN — DIGOXIN 0.06 MG: 125 TABLET ORAL at 11:34

## 2021-01-01 RX ADMIN — Medication 5 MG: at 20:50

## 2021-01-01 RX ADMIN — SODIUM CHLORIDE, PRESERVATIVE FREE 10 ML: 5 INJECTION INTRAVENOUS at 20:57

## 2021-01-01 RX ADMIN — INSULIN LISPRO 9 UNITS: 100 INJECTION, SOLUTION INTRAVENOUS; SUBCUTANEOUS at 08:51

## 2021-01-01 RX ADMIN — Medication 2000 UNITS: at 09:42

## 2021-01-01 RX ADMIN — ACETAMINOPHEN 650 MG: 325 TABLET ORAL at 00:42

## 2021-01-01 RX ADMIN — INSULIN GLARGINE 20 UNITS: 100 INJECTION, SOLUTION SUBCUTANEOUS at 20:54

## 2021-01-01 RX ADMIN — SODIUM CHLORIDE, PRESERVATIVE FREE 10 ML: 5 INJECTION INTRAVENOUS at 03:22

## 2021-01-01 RX ADMIN — DILTIAZEM HYDROCHLORIDE 120 MG: 120 CAPSULE, COATED, EXTENDED RELEASE ORAL at 10:10

## 2021-01-01 RX ADMIN — SODIUM BICARBONATE: 84 INJECTION, SOLUTION INTRAVENOUS at 04:15

## 2021-01-01 RX ADMIN — Medication 2 PUFF: at 08:22

## 2021-01-01 RX ADMIN — DIGOXIN 0.06 MG: 125 TABLET ORAL at 12:39

## 2021-01-01 RX ADMIN — INSULIN LISPRO 2 UNITS: 100 INJECTION, SOLUTION INTRAVENOUS; SUBCUTANEOUS at 12:15

## 2021-01-01 RX ADMIN — HEPARIN SODIUM 5000 UNITS: 5000 INJECTION INTRAVENOUS; SUBCUTANEOUS at 06:08

## 2021-01-01 RX ADMIN — HEPARIN SODIUM AND DEXTROSE 820 UNITS/HR: 10000; 5 INJECTION INTRAVENOUS at 20:52

## 2021-01-01 RX ADMIN — HEPARIN SODIUM 5000 UNITS: 5000 INJECTION INTRAVENOUS; SUBCUTANEOUS at 05:47

## 2021-01-01 RX ADMIN — SODIUM CHLORIDE: 9 INJECTION, SOLUTION INTRAVENOUS at 15:30

## 2021-01-01 RX ADMIN — CEFAZOLIN SODIUM 1000 MG: 1 INJECTION, POWDER, FOR SOLUTION INTRAMUSCULAR; INTRAVENOUS at 21:59

## 2021-01-01 RX ADMIN — SODIUM CHLORIDE: 9 INJECTION, SOLUTION INTRAVENOUS at 17:17

## 2021-01-01 RX ADMIN — METHYLPREDNISOLONE SODIUM SUCCINATE 40 MG: 40 INJECTION, POWDER, FOR SOLUTION INTRAMUSCULAR; INTRAVENOUS at 03:39

## 2021-01-01 RX ADMIN — HEPARIN SODIUM 5000 UNITS: 5000 INJECTION INTRAVENOUS; SUBCUTANEOUS at 15:37

## 2021-01-01 RX ADMIN — ASPIRIN 81 MG: 81 TABLET, CHEWABLE ORAL at 08:47

## 2021-01-01 RX ADMIN — APIXABAN 2.5 MG: 2.5 TABLET, FILM COATED ORAL at 20:09

## 2021-01-01 RX ADMIN — METHYLPREDNISOLONE SODIUM SUCCINATE 40 MG: 40 INJECTION, POWDER, FOR SOLUTION INTRAMUSCULAR; INTRAVENOUS at 16:10

## 2021-01-01 RX ADMIN — ACETAMINOPHEN 650 MG: 325 TABLET ORAL at 09:38

## 2021-01-01 RX ADMIN — ESCITALOPRAM OXALATE 10 MG: 10 TABLET ORAL at 09:42

## 2021-01-01 RX ADMIN — Medication 2000 UNITS: at 08:47

## 2021-01-01 RX ADMIN — ROSUVASTATIN CALCIUM 40 MG: 10 TABLET, FILM COATED ORAL at 09:51

## 2021-01-01 RX ADMIN — Medication 2 PUFF: at 08:51

## 2021-01-01 RX ADMIN — HEPARIN SODIUM 5000 UNITS: 5000 INJECTION INTRAVENOUS; SUBCUTANEOUS at 15:23

## 2021-01-01 RX ADMIN — METHYLPREDNISOLONE SODIUM SUCCINATE 40 MG: 40 INJECTION, POWDER, FOR SOLUTION INTRAMUSCULAR; INTRAVENOUS at 03:37

## 2021-01-01 RX ADMIN — METHYLPREDNISOLONE SODIUM SUCCINATE 40 MG: 40 INJECTION, POWDER, FOR SOLUTION INTRAMUSCULAR; INTRAVENOUS at 17:13

## 2021-01-01 RX ADMIN — LORAZEPAM 1 MG: 2 INJECTION INTRAMUSCULAR; INTRAVENOUS at 04:13

## 2021-01-01 RX ADMIN — ESCITALOPRAM OXALATE 10 MG: 10 TABLET ORAL at 09:16

## 2021-01-01 RX ADMIN — ASPIRIN 81 MG: 81 TABLET, CHEWABLE ORAL at 10:26

## 2021-01-01 RX ADMIN — INSULIN GLARGINE 15 UNITS: 100 INJECTION, SOLUTION SUBCUTANEOUS at 09:41

## 2021-01-01 RX ADMIN — APIXABAN 2.5 MG: 2.5 TABLET, FILM COATED ORAL at 08:42

## 2021-01-01 RX ADMIN — INSULIN LISPRO 3 UNITS: 100 INJECTION, SOLUTION INTRAVENOUS; SUBCUTANEOUS at 21:14

## 2021-01-01 RX ADMIN — METHYLPREDNISOLONE SODIUM SUCCINATE 40 MG: 40 INJECTION, POWDER, FOR SOLUTION INTRAMUSCULAR; INTRAVENOUS at 16:37

## 2021-01-01 RX ADMIN — ROSUVASTATIN CALCIUM 40 MG: 10 TABLET, FILM COATED ORAL at 08:28

## 2021-01-01 RX ADMIN — SODIUM CHLORIDE, PRESERVATIVE FREE 10 ML: 5 INJECTION INTRAVENOUS at 21:30

## 2021-01-01 RX ADMIN — METHYLPREDNISOLONE SODIUM SUCCINATE 40 MG: 40 INJECTION, POWDER, FOR SOLUTION INTRAMUSCULAR; INTRAVENOUS at 15:30

## 2021-01-01 RX ADMIN — INSULIN GLARGINE 23 UNITS: 100 INJECTION, SOLUTION SUBCUTANEOUS at 20:39

## 2021-01-01 RX ADMIN — SODIUM CHLORIDE: 9 INJECTION, SOLUTION INTRAVENOUS at 06:37

## 2021-01-01 RX ADMIN — TAMSULOSIN HYDROCHLORIDE 0.4 MG: 0.4 CAPSULE ORAL at 09:42

## 2021-01-01 RX ADMIN — SODIUM CHLORIDE, PRESERVATIVE FREE 10 ML: 5 INJECTION INTRAVENOUS at 10:48

## 2021-01-01 RX ADMIN — METHYLPREDNISOLONE SODIUM SUCCINATE 40 MG: 40 INJECTION, POWDER, FOR SOLUTION INTRAMUSCULAR; INTRAVENOUS at 03:33

## 2021-01-01 RX ADMIN — HEPARIN SODIUM AND DEXTROSE 980 UNITS/HR: 10000; 5 INJECTION INTRAVENOUS at 12:06

## 2021-01-01 RX ADMIN — SODIUM CHLORIDE: 9 INJECTION, SOLUTION INTRAVENOUS at 03:50

## 2021-01-01 RX ADMIN — TAMSULOSIN HYDROCHLORIDE 0.4 MG: 0.4 CAPSULE ORAL at 09:50

## 2021-01-01 RX ADMIN — INSULIN LISPRO 6 UNITS: 100 INJECTION, SOLUTION INTRAVENOUS; SUBCUTANEOUS at 17:29

## 2021-01-01 RX ADMIN — APIXABAN 2.5 MG: 2.5 TABLET, FILM COATED ORAL at 21:39

## 2021-01-01 RX ADMIN — INSULIN LISPRO 9 UNITS: 100 INJECTION, SOLUTION INTRAVENOUS; SUBCUTANEOUS at 12:37

## 2021-01-01 RX ADMIN — APIXABAN 2.5 MG: 2.5 TABLET, FILM COATED ORAL at 21:04

## 2021-01-01 RX ADMIN — FAMOTIDINE 20 MG: 10 INJECTION, SOLUTION INTRAVENOUS at 07:56

## 2021-01-01 RX ADMIN — SODIUM CHLORIDE, PRESERVATIVE FREE 10 ML: 5 INJECTION INTRAVENOUS at 08:29

## 2021-01-01 RX ADMIN — Medication 2 PUFF: at 09:07

## 2021-01-01 RX ADMIN — ACETAMINOPHEN 650 MG: 325 TABLET ORAL at 08:25

## 2021-01-01 RX ADMIN — METHYLPREDNISOLONE SODIUM SUCCINATE 40 MG: 40 INJECTION, POWDER, FOR SOLUTION INTRAMUSCULAR; INTRAVENOUS at 15:04

## 2021-01-01 RX ADMIN — SODIUM CHLORIDE, PRESERVATIVE FREE 10 ML: 5 INJECTION INTRAVENOUS at 21:14

## 2021-01-01 RX ADMIN — METHYLPREDNISOLONE SODIUM SUCCINATE 40 MG: 40 INJECTION, POWDER, FOR SOLUTION INTRAMUSCULAR; INTRAVENOUS at 15:23

## 2021-01-01 RX ADMIN — PIPERACILLIN AND TAZOBACTAM 3375 MG: 3; .375 INJECTION, POWDER, LYOPHILIZED, FOR SOLUTION INTRAVENOUS at 05:01

## 2021-01-01 RX ADMIN — ESCITALOPRAM OXALATE 10 MG: 10 TABLET ORAL at 09:32

## 2021-01-01 RX ADMIN — TAMSULOSIN HYDROCHLORIDE 0.4 MG: 0.4 CAPSULE ORAL at 09:32

## 2021-01-01 RX ADMIN — Medication 2 PUFF: at 08:42

## 2021-01-01 RX ADMIN — DEXAMETHASONE SODIUM PHOSPHATE 4 MG: 4 INJECTION, SOLUTION INTRAMUSCULAR; INTRAVENOUS at 08:48

## 2021-01-01 RX ADMIN — ACETAMINOPHEN 650 MG: 325 TABLET ORAL at 14:59

## 2021-01-01 RX ADMIN — DILTIAZEM HYDROCHLORIDE 120 MG: 120 CAPSULE, COATED, EXTENDED RELEASE ORAL at 09:39

## 2021-01-01 RX ADMIN — INSULIN LISPRO 3 UNITS: 100 INJECTION, SOLUTION INTRAVENOUS; SUBCUTANEOUS at 10:33

## 2021-01-01 RX ADMIN — HEPARIN SODIUM 5000 UNITS: 5000 INJECTION INTRAVENOUS; SUBCUTANEOUS at 16:36

## 2021-01-01 RX ADMIN — LORAZEPAM 1 MG: 2 INJECTION INTRAMUSCULAR; INTRAVENOUS at 01:23

## 2021-01-01 RX ADMIN — ASPIRIN 81 MG: 81 TABLET, CHEWABLE ORAL at 09:16

## 2021-01-01 RX ADMIN — Medication 2 PUFF: at 21:34

## 2021-01-01 RX ADMIN — INSULIN LISPRO 2 UNITS: 100 INJECTION, SOLUTION INTRAVENOUS; SUBCUTANEOUS at 20:53

## 2021-01-01 RX ADMIN — ESCITALOPRAM OXALATE 10 MG: 10 TABLET ORAL at 10:46

## 2021-01-01 RX ADMIN — INSULIN LISPRO 6 UNITS: 100 INJECTION, SOLUTION INTRAVENOUS; SUBCUTANEOUS at 12:29

## 2021-01-01 RX ADMIN — HEPARIN SODIUM 2180 UNITS: 1000 INJECTION INTRAVENOUS; SUBCUTANEOUS at 05:26

## 2021-01-01 RX ADMIN — TAMSULOSIN HYDROCHLORIDE 0.4 MG: 0.4 CAPSULE ORAL at 08:25

## 2021-01-01 RX ADMIN — ASPIRIN 81 MG: 81 TABLET, CHEWABLE ORAL at 10:15

## 2021-01-01 RX ADMIN — INSULIN LISPRO 6 UNITS: 100 INJECTION, SOLUTION INTRAVENOUS; SUBCUTANEOUS at 08:54

## 2021-01-01 RX ADMIN — Medication 2 PUFF: at 20:46

## 2021-01-01 RX ADMIN — INSULIN GLARGINE 25 UNITS: 100 INJECTION, SOLUTION SUBCUTANEOUS at 10:16

## 2021-01-01 RX ADMIN — LORAZEPAM 1 MG: 2 INJECTION INTRAMUSCULAR; INTRAVENOUS at 13:37

## 2021-01-01 RX ADMIN — INSULIN LISPRO 5 UNITS: 100 INJECTION, SOLUTION INTRAVENOUS; SUBCUTANEOUS at 20:40

## 2021-01-01 RX ADMIN — INSULIN LISPRO 1 UNITS: 100 INJECTION, SOLUTION INTRAVENOUS; SUBCUTANEOUS at 09:58

## 2021-01-01 RX ADMIN — BARICITINIB 1 MG: 1 TABLET, FILM COATED ORAL at 09:37

## 2021-01-01 RX ADMIN — SODIUM CHLORIDE, PRESERVATIVE FREE 10 ML: 5 INJECTION INTRAVENOUS at 09:53

## 2021-01-01 RX ADMIN — METHYLPREDNISOLONE SODIUM SUCCINATE 40 MG: 40 INJECTION, POWDER, FOR SOLUTION INTRAMUSCULAR; INTRAVENOUS at 09:17

## 2021-01-01 RX ADMIN — INSULIN LISPRO 2 UNITS: 100 INJECTION, SOLUTION INTRAVENOUS; SUBCUTANEOUS at 22:40

## 2021-01-01 RX ADMIN — Medication 2000 UNITS: at 10:46

## 2021-01-01 RX ADMIN — INSULIN LISPRO 3 UNITS: 100 INJECTION, SOLUTION INTRAVENOUS; SUBCUTANEOUS at 17:58

## 2021-01-01 RX ADMIN — INSULIN LISPRO 6 UNITS: 100 INJECTION, SOLUTION INTRAVENOUS; SUBCUTANEOUS at 21:59

## 2021-01-01 RX ADMIN — SODIUM CHLORIDE, PRESERVATIVE FREE 10 ML: 5 INJECTION INTRAVENOUS at 09:55

## 2021-01-01 RX ADMIN — ASPIRIN 81 MG: 81 TABLET, CHEWABLE ORAL at 08:42

## 2021-01-01 RX ADMIN — METHYLPREDNISOLONE SODIUM SUCCINATE 40 MG: 40 INJECTION, POWDER, FOR SOLUTION INTRAMUSCULAR; INTRAVENOUS at 04:06

## 2021-01-01 RX ADMIN — METHYLPREDNISOLONE SODIUM SUCCINATE 40 MG: 40 INJECTION, POWDER, FOR SOLUTION INTRAMUSCULAR; INTRAVENOUS at 03:22

## 2021-01-01 RX ADMIN — ESCITALOPRAM OXALATE 10 MG: 10 TABLET ORAL at 10:09

## 2021-01-01 RX ADMIN — METHYLPREDNISOLONE SODIUM SUCCINATE 40 MG: 40 INJECTION, POWDER, FOR SOLUTION INTRAMUSCULAR; INTRAVENOUS at 15:05

## 2021-01-01 RX ADMIN — SODIUM CHLORIDE, PRESERVATIVE FREE 10 ML: 5 INJECTION INTRAVENOUS at 09:51

## 2021-01-01 RX ADMIN — ROSUVASTATIN CALCIUM 40 MG: 10 TABLET, FILM COATED ORAL at 09:32

## 2021-01-01 RX ADMIN — ROSUVASTATIN CALCIUM 40 MG: 10 TABLET, FILM COATED ORAL at 09:52

## 2021-01-01 RX ADMIN — DILTIAZEM HYDROCHLORIDE 120 MG: 120 CAPSULE, COATED, EXTENDED RELEASE ORAL at 09:16

## 2021-01-01 RX ADMIN — Medication 2000 MG: at 08:33

## 2021-01-01 RX ADMIN — METHYLPREDNISOLONE SODIUM SUCCINATE 40 MG: 40 INJECTION, POWDER, FOR SOLUTION INTRAMUSCULAR; INTRAVENOUS at 03:48

## 2021-01-01 RX ADMIN — DIGOXIN 0.06 MG: 125 TABLET ORAL at 11:29

## 2021-01-01 RX ADMIN — SODIUM ZIRCONIUM CYCLOSILICATE 10 G: 5 POWDER, FOR SUSPENSION ORAL at 12:31

## 2021-01-01 RX ADMIN — Medication 2 PUFF: at 20:00

## 2021-01-01 SDOH — ECONOMIC STABILITY: FOOD INSECURITY: WITHIN THE PAST 12 MONTHS, THE FOOD YOU BOUGHT JUST DIDN'T LAST AND YOU DIDN'T HAVE MONEY TO GET MORE.: NEVER TRUE

## 2021-01-01 ASSESSMENT — PULMONARY FUNCTION TESTS
PIF_VALUE: 0
PIF_VALUE: 16
PIF_VALUE: 20
PIF_VALUE: 20
PIF_VALUE: 3
PIF_VALUE: 20
PIF_VALUE: 21
PIF_VALUE: 20
PIF_VALUE: 21
PIF_VALUE: 0
PIF_VALUE: 27
PIF_VALUE: 20
PIF_VALUE: 20
PIF_VALUE: 8
PIF_VALUE: 17
PIF_VALUE: 20
PIF_VALUE: 19
PIF_VALUE: 20
PIF_VALUE: 17
PIF_VALUE: 16
PIF_VALUE: 20
PIF_VALUE: 15
PIF_VALUE: 8
PIF_VALUE: 14
PIF_VALUE: 16
PIF_VALUE: 20
PIF_VALUE: 20
PIF_VALUE: 17
PIF_VALUE: 17
PIF_VALUE: 1
PIF_VALUE: 20
PIF_VALUE: 23
PIF_VALUE: 21
PIF_VALUE: 19
PIF_VALUE: 15
PIF_VALUE: 20
PIF_VALUE: 20
PIF_VALUE: 14
PIF_VALUE: 16
PIF_VALUE: 14
PIF_VALUE: 22
PIF_VALUE: 20
PIF_VALUE: 17
PIF_VALUE: 0
PIF_VALUE: 20
PIF_VALUE: 17
PIF_VALUE: 20
PIF_VALUE: 21
PIF_VALUE: 20
PIF_VALUE: 0
PIF_VALUE: 20
PIF_VALUE: 20
PIF_VALUE: 17
PIF_VALUE: 20
PIF_VALUE: 6
PIF_VALUE: 15
PIF_VALUE: 16
PIF_VALUE: 20
PIF_VALUE: 20
PIF_VALUE: 17
PIF_VALUE: 20
PIF_VALUE: 19
PIF_VALUE: 17
PIF_VALUE: 14
PIF_VALUE: 17
PIF_VALUE: 21
PIF_VALUE: 19
PIF_VALUE: 20
PIF_VALUE: 17
PIF_VALUE: 20
PIF_VALUE: 18
PIF_VALUE: 20
PIF_VALUE: 17
PIF_VALUE: 0
PIF_VALUE: 21
PIF_VALUE: 20
PIF_VALUE: 17
PIF_VALUE: 20
PIF_VALUE: 14
PIF_VALUE: 20
PIF_VALUE: 14
PIF_VALUE: 17
PIF_VALUE: 20
PIF_VALUE: 20
PIF_VALUE: 16
PIF_VALUE: 14
PIF_VALUE: 17
PIF_VALUE: 20
PIF_VALUE: 14
PIF_VALUE: 17
PIF_VALUE: 21
PIF_VALUE: 19
PIF_VALUE: 19
PIF_VALUE: 28
PIF_VALUE: 21
PIF_VALUE: 14
PIF_VALUE: 19
PIF_VALUE: 20
PIF_VALUE: 17
PIF_VALUE: 2
PIF_VALUE: 20
PIF_VALUE: 16
PIF_VALUE: 20
PIF_VALUE: 17
PIF_VALUE: 20
PIF_VALUE: 14
PIF_VALUE: 18
PIF_VALUE: 20
PIF_VALUE: 16
PIF_VALUE: 14
PIF_VALUE: 20
PIF_VALUE: 17
PIF_VALUE: 19
PIF_VALUE: 16
PIF_VALUE: 20
PIF_VALUE: 17
PIF_VALUE: 19
PIF_VALUE: 20
PIF_VALUE: 20
PIF_VALUE: 17
PIF_VALUE: 21
PIF_VALUE: 20
PIF_VALUE: 17
PIF_VALUE: 17
PIF_VALUE: 21
PIF_VALUE: 18
PIF_VALUE: 20
PIF_VALUE: 17
PIF_VALUE: 21
PIF_VALUE: 19
PIF_VALUE: 17
PIF_VALUE: 16
PIF_VALUE: 20
PIF_VALUE: 20
PIF_VALUE: 21
PIF_VALUE: 0
PIF_VALUE: 20
PIF_VALUE: 21
PIF_VALUE: 20
PIF_VALUE: 20
PIF_VALUE: 2
PIF_VALUE: 20
PIF_VALUE: 19
PIF_VALUE: 18
PIF_VALUE: 20
PIF_VALUE: 17
PIF_VALUE: 20
PIF_VALUE: 0
PIF_VALUE: 16

## 2021-01-01 ASSESSMENT — PAIN SCALES - GENERAL
PAINLEVEL_OUTOF10: 0
PAINLEVEL_OUTOF10: 4
PAINLEVEL_OUTOF10: 3
PAINLEVEL_OUTOF10: 0
PAINLEVEL_OUTOF10: 2
PAINLEVEL_OUTOF10: 0
PAINLEVEL_OUTOF10: 2
PAINLEVEL_OUTOF10: 0
PAINLEVEL_OUTOF10: 1
PAINLEVEL_OUTOF10: 0
PAINLEVEL_OUTOF10: 3
PAINLEVEL_OUTOF10: 0
PAINLEVEL_OUTOF10: 3
PAINLEVEL_OUTOF10: 0

## 2021-01-01 ASSESSMENT — ENCOUNTER SYMPTOMS
CONSTIPATION: 0
ABDOMINAL DISTENTION: 0
NAUSEA: 0
SHORTNESS OF BREATH: 0
CONSTIPATION: 0
ABDOMINAL PAIN: 0
VOMITING: 0
DIARRHEA: 0
BACK PAIN: 0
TROUBLE SWALLOWING: 0
SHORTNESS OF BREATH: 1
ANAL BLEEDING: 0
FACIAL SWELLING: 0
STRIDOR: 0
WHEEZING: 0
SHORTNESS OF BREATH: 0
COUGH: 1
TROUBLE SWALLOWING: 0
SORE THROAT: 0
EYE DISCHARGE: 0
WHEEZING: 0
DIARRHEA: 0
RHINORRHEA: 0
COUGH: 0
NAUSEA: 0
BACK PAIN: 0
SHORTNESS OF BREATH: 0
NAUSEA: 0
VOMITING: 0
VOMITING: 0
SINUS PAIN: 0
COUGH: 0
DIARRHEA: 0
BLOOD IN STOOL: 0
EYE ITCHING: 0
SORE THROAT: 0
EYE PAIN: 0
EYE REDNESS: 0
CHEST TIGHTNESS: 0
NAUSEA: 0
SORE THROAT: 0
SINUS PRESSURE: 0
COUGH: 0
CHEST TIGHTNESS: 0
SINUS PRESSURE: 1
ABDOMINAL PAIN: 0
DIARRHEA: 0
CONSTIPATION: 0
PHOTOPHOBIA: 0
CHEST TIGHTNESS: 0
ABDOMINAL DISTENTION: 0
CHEST TIGHTNESS: 0
BACK PAIN: 0
VOMITING: 0
VOICE CHANGE: 0

## 2021-01-01 ASSESSMENT — PATIENT HEALTH QUESTIONNAIRE - PHQ9
2. FEELING DOWN, DEPRESSED OR HOPELESS: 0
1. LITTLE INTEREST OR PLEASURE IN DOING THINGS: 0
SUM OF ALL RESPONSES TO PHQ QUESTIONS 1-9: 0
SUM OF ALL RESPONSES TO PHQ9 QUESTIONS 1 & 2: 0
SUM OF ALL RESPONSES TO PHQ QUESTIONS 1-9: 0
2. FEELING DOWN, DEPRESSED OR HOPELESS: 0
SUM OF ALL RESPONSES TO PHQ9 QUESTIONS 1 & 2: 0
SUM OF ALL RESPONSES TO PHQ QUESTIONS 1-9: 0
1. LITTLE INTEREST OR PLEASURE IN DOING THINGS: 0
SUM OF ALL RESPONSES TO PHQ QUESTIONS 1-9: 0
SUM OF ALL RESPONSES TO PHQ QUESTIONS 1-9: 0

## 2021-01-01 ASSESSMENT — PAIN DESCRIPTION - PAIN TYPE: TYPE: SURGICAL PAIN

## 2021-01-01 ASSESSMENT — PAIN DESCRIPTION - ORIENTATION: ORIENTATION: LEFT;LOWER

## 2021-01-01 ASSESSMENT — PAIN DESCRIPTION - LOCATION: LOCATION: BACK

## 2021-01-01 ASSESSMENT — PAIN SCALES - WONG BAKER
WONGBAKER_NUMERICALRESPONSE: 0
WONGBAKER_NUMERICALRESPONSE: 0

## 2021-01-01 ASSESSMENT — LIFESTYLE VARIABLES: HOW OFTEN DO YOU HAVE A DRINK CONTAINING ALCOHOL: 0

## 2021-01-01 ASSESSMENT — PAIN - FUNCTIONAL ASSESSMENT: PAIN_FUNCTIONAL_ASSESSMENT: 0-10

## 2021-01-07 NOTE — PROGRESS NOTES
Subjective:      Patient ID: Boogie Zuleta is a 81 y.o. male. HPI      Here for f/u hyperlipidemia, htn, dm, renal insufficiency. C/o worsening tremor B hands. Makes it difficult for write and eat. Fasting blood sugars have improved since adding insulin. Denies hypoglycemic episodes. htn has improved since starting ramipril 10mg and propranolol 20mg. Takes his crestor in the evenings and it has improved. Has been avoiding otc nephrotoxins. Being followed by Nephrology for renal insufficiency which has improved since right ureteroscopy, stone manipulation and stent placement      Review of Systems   Constitutional: Negative for unexpected weight change. Respiratory: Negative for cough, chest tightness, shortness of breath and wheezing. Cardiovascular: Negative for chest pain, palpitations and leg swelling. Gastrointestinal: Negative for abdominal distention, constipation, diarrhea, nausea and vomiting. Musculoskeletal: Negative for arthralgias, back pain and myalgias. Skin: Negative for rash. Neurological: Negative for  and numbness. +tremor  All other systems reviewed and are negative. Objective:   Physical Exam   Constitutional: He is oriented to person, place, and time. He appears well-developed and well-nourished. No distress. Neck: Neck supple. No tracheal deviation present. No thyromegaly present. full rom  Cardiovascular: Normal rate, regular rhythm and intact distal pulses. Murmur heard. Pulmonary/Chest: Effort normal and breath sounds normal. No respiratory distress. He has no wheezes. He has no rales. He exhibits no tenderness. Abdominal: Soft. He exhibits no distension. There is no tenderness. There is no rebound and no guarding. No hepatosplenomegaly   Musculoskeletal: He exhibits no edema. Neurological: He is alert and oriented to person, place, and time. Skin: He is not diaphoretic. Psychiatric: He has a normal mood and affect.  His behavior

## 2021-03-31 NOTE — TELEPHONE ENCOUNTER
Refill request for toujeo medication.      Name of [facility]- [facility]      Last visit - 1-7-21     Pending visit - 6-29-21    Last refill -1-15-21      Medication Contract signed -   Abhay rao-         Additional Comments

## 2021-04-20 NOTE — TELEPHONE ENCOUNTER
Refill request for crestor medication.      Name of [facility]- [facility]      Last visit - 1-7-21     Pending visit - 6-29-21    Last refill -1-15-21      Medication Contract signed -   Last Edmonia Galeazzi ran-         Additional Comments

## 2021-05-14 NOTE — PROGRESS NOTES
[facility] PHYSICIAN PRACTICES  [address]  [address]  Dept: [phone]    Preoperative Consultation    Nuria López  YOB: 1935    Date of Service:  5/14/2021    Vitals:    05/14/21 0746   BP: 132/74   Site: Right Upper Arm   Position: Sitting   Cuff Size: Large Adult   Pulse: 72   Temp: 97.5 °F (36.4 °C)   TempSrc: Temporal   SpO2: 99%   Weight: 145 lb (65.8 kg)      Wt Readings from Last 2 Encounters:   05/14/21 145 lb (65.8 kg)   01/07/21 143 lb (64.9 kg)     BP Readings from Last 3 Encounters:   05/14/21 132/74   01/07/21 118/76   09/22/20 122/68      Chief Complaint   Patient presents with    Pre-op Exam     kidney stone / [place] : 6-1-2021 / Dr. Saira Valles / [phone]     No Known Allergies  Outpatient Medications Marked as Taking for the 5/14/21 encounter (Office Visit) with ROXANA Yates CNP   Medication Sig Dispense Refill    rosuvastatin (CRESTOR) 40 MG tablet TAKE ONE TABLET BY MOUTH DAILY 90 tablet 1    TOUJEO SOLOSTAR 300 UNIT/ML SOPN INJECT 10 UNITS UNDER THE SKIN DAILY 5 pen 4    escitalopram (LEXAPRO) 10 MG tablet TAKE ONE TABLET BY MOUTH DAILY 30 tablet 11    B-D UF III MINI PEN NEEDLES 31G X 5 MM MISC USE ONE - DAILY 100 each 4    fluorouracil (EFUDEX) 5 % cream Apply twice daily to scalp for 2 weeks 40 g 0    TRUE METRIX BLOOD GLUCOSE TEST strip USE TO TEST BLOOD SUGAR TWO TIMES A DAY FOR DIABETES 100 strip 5    tamsulosin (FLOMAX) 0.4 MG capsule Take 0.4 mg by mouth daily      acetaminophen (TYLENOL) 500 MG tablet Take 500 mg by mouth every 6 hours as needed for Pain      Cholecalciferol (VITAMIN D3) 2000 units CAPS Take by mouth      Blood Glucose Monitoring Suppl (TRUE METRIX AIR GLUCOSE METER) ANDRES E11.65 1 Device 0    Blood Glucose Monitoring Suppl (TRUE METRIX AIR GLUCOSE METER) W/DEVICE KIT Dispense Blood Glucose monitoring kit for diabetes ICD-10-CM: E11.65 1 kit 0    Lancets MISC Test one time daily 100 each 3    aspirin 81 MG chewable tablet Take 81 mg by mouth daily. This patient presents to the office today for a preoperative consultation at the request of surgeon, Dr. Nic Murphy, who plans on performing kidney stone removal on June 1 at [address] [place] [address].  The current problem began several years ago, and symptoms have been worsening with time. Conservative therapy: N/A. Planned anesthesia: IV sedation   Known anesthesia problems: None   Bleeding risk: No recent or remote history of abnormal bleeding  Personal or FH of DVT/PE: No    Patient objection to receiving blood products: No      RCRI       +1 +0    1.) High-Risk Surgery      []   [x]     ? Intraperitoneal   ? Intrathoracic   ? Suprainguinal vascular     2.) History of ischemic heart disease   []   [x]     ? History of MI   ? History of positiveexercise test   ? Current chest paint considered due       to myocardial ischemia   ? Use of nitrate therapy   ? ECG with pathological Q waves     3.) History ofcongestive heart failure   []   [x]     ? Pulmonary edema, bilateral rales or S3 gallop   ? Paroxysmal nocturnal dyspnea   ? CXR showing pulmonary vascular redistribution     4. )History of cerebrovascular disease   []   [x]     ? Prior TIA or stroke     5.) Pre-operative insulin treatment   []   [x]     6.) Pre-operative creatinine >2 mg/dL   []   [x]       1. Are you a loud and/or regular snorer? []  Yes      [x] No     2. Have you been observed to gasp or stop breathing during sleep? []  Yes      [x] No     3. Do you feel tired or groggy upon awakening or do you awaken with a headache?                       []  Yes      [x] No     4. Are you often tired or fatigued during the wake time hours? []  Yes      [x] No     5. Do you fall asleep sitting, reading, watching TV or driving? []  Yes      [x] No     6. Do you often have problems with memory or concentration? []  Yes      [x] No     **If patient's score is ? 3 they are considered high risk for ROGELIO. Notify the anesthesiologist of the high risk and document in focus note. Note:  If the patient's BMI is more than 35 kg m¯² , has neck circumference > 40 cm, and/or high blood pressure the risk is greater (© American Sleep Apnea Association, 2006). Patient Active Problem List   Diagnosis    MVP (mitral valve prolapse)    Hyperlipemia    BPH (benign prostatic hyperplasia)    IGT (impaired glucose tolerance)    Dysmetabolic syndrome X    Benign essential tremor    HTN (hypertension)    Hematuria    Renal insufficiency    Diabetes mellitus ([address]    Murmur, cardiac    Thoracic compression fracture (HCC)    Bradycardia    Type 2 diabetes mellitus with hyperglycemia (HCC)    Right wrist pain    Abrasion of right forearm    Abrasion of left forearm    Lymphocytosis    Chronic kidney disease, stage III (moderate)    Essential tremor    DM (diabetes mellitus), secondary, uncontrolled, w/neurologic complic (HCC)    HTN (hypertension), benign    Dyslipidemia    Chronic kidney disease (CKD), stage IV (severe) (HCC)     Past Medical History:   Diagnosis Date    Back pain     Cancer (HCC)     skin    Chronic kidney disease     Diabetes mellitus ([address]     Essential tremor     History of BPH     Hypercholesteremia     Hypertension     Renal insufficiency     Type 2 diabetes mellitus with hyperglycemia ([address] 12/21/2017     Past Surgical History:   Procedure Laterality Date    CATARACT REMOVAL      CYSTOSCOPY      HERNIA REPAIR      KYPHOSIS SURGERY  8/24/2012    KYPHOPLASTY T10 AND T12     Family History   Problem Relation Age of Onset    Heart Disease Father     Heart Disease Sister      Social History     Socioeconomic History    Marital status:       Spouse name: Not on file    Number of children: Not on file    Years of education: Not on file    Highest education level: Not on file   Occupational History    Not on file   Social Needs    Financial resource strain: Not hard at all   [employer] insecurity     Worry: Never true     Inability: Never true   [employer] needs     Medical: Not on file     Non-medical: Not on file   Tobacco Use    Smoking status: Former Smoker     Packs/day: 1.00     Years: 10.00     Pack years: 10.00     Types: Cigarettes     Quit date: 3/19/1978     Years since quittin.1    Smokeless tobacco: Never Used    Tobacco comment: quit 25 years ago   Substance and Sexual Activity    Alcohol use: Yes     Comment: on occasion    Drug use: No    Sexual activity: Not on file   Lifestyle    Physical activity     Days per week: Not on file     Minutes per session: Not on file    Stress: Not on file   Relationships    Social connections     Talks on phone: Not on file     Gets together: Not on file     Attends Mandaeism service: Not on file     Active member of club or organization: Not on file     Attends meetings of clubs or organizations: Not on file     Relationship status: Not on file    Intimate partner violence     Fear of current or ex partner: Not on file     Emotionally abused: Not on file     Physically abused: Not on file     Forced sexual activity: Not on file   Other Topics Concern    Not on file   Social History Narrative    Not on file       Review of Systems   Constitutional: Negative for fever. HENT: Negative for sinus pain and sore throat. Respiratory: Negative for cough, chest tightness and shortness of breath. Cardiovascular: Negative for chest pain and palpitations. Gastrointestinal: Negative for constipation, diarrhea, nausea and vomiting. Genitourinary: Negative for dysuria and urgency. Skin: Negative for rash. Neurological: Negative for dizziness and weakness. All other systems were reviewed and are negative. Physical Exam  Vitals signs and nursing note reviewed. Constitutional:       Appearance: Normal appearance. He is well-developed.    HENT:      Head: Normocephalic and atraumatic. Right Ear: Hearing and external ear normal.      Left Ear: Hearing and external ear normal.      Nose: Nose normal.   Eyes:      General: Lids are normal.      Pupils: Pupils are equal, round, and reactive to light. Neck:      Musculoskeletal: Normal range of motion. Cardiovascular:      Rate and Rhythm: Normal rate and regular rhythm. No extrasystoles are present. Chest Wall: PMI is not displaced. Pulses: Normal pulses. Heart sounds: Normal heart sounds, S1 normal and S2 normal. Heart sounds not distant. No murmur. No systolic murmur. No diastolic murmur. No friction rub. No gallop. No S3 or S4 sounds. Pulmonary:      Effort: Pulmonary effort is normal. No accessory muscle usage or respiratory distress. Breath sounds: Normal breath sounds. No decreased breath sounds, wheezing, rhonchi or rales. Abdominal:      General: Bowel sounds are normal.      Palpations: Abdomen is soft. Skin:     General: Skin is warm and dry. Neurological:      Mental Status: He is alert. Psychiatric:         Speech: Speech normal.         EKG Interpretation:  normal sinus rhythm, RBBB, unchanged from previous tracings.     Lab Review   Hospital Outpatient Visit on 05/07/2021   Component Date Value    Sodium 05/07/2021 137     Potassium 05/07/2021 4.7     Chloride 05/07/2021 102     CO2 05/07/2021 25     Anion Gap 05/07/2021 10     Glucose 05/07/2021 195*    BUN 05/07/2021 29*    CREATININE 05/07/2021 2.1*    GFR Non- 05/07/2021 30*    GFR  05/07/2021 37*    Calcium 05/07/2021 9.3     Phosphorus 05/07/2021 3.2     Albumin 05/07/2021 4.5     WBC 05/07/2021 47.2*    RBC 05/07/2021 4.97     Hemoglobin 05/07/2021 14.6     Hematocrit 05/07/2021 45.0     MCV 05/07/2021 90.7     MCH 05/07/2021 29.4     MCHC 05/07/2021 32.4     RDW 05/07/2021 15.8*    Platelets 35/28/1400 104*    MPV 05/07/2021 8.8     Path Consult 05/07/2021 No     Neutrophils % 05/07/2021 7.0     Lymphocytes % 05/07/2021 93.0     Monocytes % 05/07/2021 0.0     Eosinophils % 05/07/2021 0.0     Basophils % 05/07/2021 0.0     Neutrophils Absolute 05/07/2021 3.3     Lymphocytes Absolute 05/07/2021 43.9*    Monocytes Absolute 05/07/2021 0.0     Eosinophils Absolute 05/07/2021 0.0     Basophils Absolute 05/07/2021 0.0     Vit D, 25-Hydroxy 05/07/2021 71.8     Color, UA 05/07/2021 Yellow     Clarity, UA 05/07/2021 Clear     Glucose, Ur 05/07/2021 Negative     Bilirubin Urine 05/07/2021 Negative     Ketones, Urine 05/07/2021 Negative     Specific Gravity, UA 05/07/2021 1.020     Blood, Urine 05/07/2021 SMALL*    pH, UA 05/07/2021 5.5     Protein, UA 05/07/2021 TRACE*    Urobilinogen, Urine 05/07/2021 0.2     Nitrite, Urine 05/07/2021 Negative     Leukocyte Esterase, Urine 05/07/2021 SMALL*    Microscopic Examination 05/07/2021 YES     Urine Type 05/07/2021 unknown     Protein, Ur 05/07/2021 25.00*    Creatinine, Ur 05/07/2021 70.7     Protein/Creat Ratio 05/07/2021 0.4     WBC, UA 05/07/2021 3-5     RBC, UA 05/07/2021 None seen        Assessment:       81 y.o. patient with planned surgery as above. Known risk factors for perioperative complications: Diabetes mellitus, Hypertension  Current medications which may produce withdrawal symptoms ifwithheld perioperatively: none     Plan: 1. Preoperative workup as follows: none  2. Change in medication regimen before surgery: Hold all medications on morning of surgery, Discontinue NSAIDs 7 days before surgery, Ok to take Tylenol prior to surgery.   3. Prophylaxis for cardiac events with perioperative beta-blockers: Not indicated  ACC/AHA indications for pre-operative beta-blocker use:    · Vascular surgery with history of postitive stress test  · Intermediate or high risk surgery with history of CAD   · Intermediate or high risk surgery with multiple clinical predictors of CAD- 2 of the following: history of compensated or prior heart failure, history ofcerebrovascular disease, DM, or renal insufficiency    Routine administration of higher-dose, long-acting metoprolol in beta-blocker-naïve patients on the day of surgery, and in the absence of dose titration is associated with an overall increase in mortality. Beta-blockers should be started days to weeks prior to surgery and titrated to pulse < 70.  4. Deep vein thrombosis prophylaxis: regimen to be chosen by surgical team  5.  No contraindications to planned surgery        Sharath Gibbons, ROXANA - CNP

## 2021-05-25 NOTE — PROGRESS NOTES
Augusta Tran received a viral test for COVID-19. They were educated on isolation and quarantine as appropriate. For any symptoms, they were directed to seek care from their PCP, given contact information to establish with a doctor, directed to an urgent care or the emergency room.

## 2021-05-25 NOTE — PRE-PROCEDURE INSTRUCTIONS

## 2021-05-25 NOTE — PATIENT INSTRUCTIONS
Advance Care Planning  People with COVID-19 may have no symptoms, mild symptoms, such as fever, cough, and shortness of breath or they may have more severe illness, developing severe and fatal pneumonia. As a result, Advance Care Planning with attention to naming a health care decision maker (someone you trust to make healthcare decisions for you if you could not speak for yourself) and sharing other health care preferences is important BEFORE a possible health crisis. Please contact your Primary Care Provider to discuss Advance Care Planning. Preventing the Spread of Coronavirus Disease 2019 in Homes and Residential Communities  For the most recent information go to [email or web address]    Prevention steps for People with confirmed or suspected COVID-19 (including persons under investigation) who do not need to be hospitalized  and   People with confirmed COVID-19 who were hospitalized and determined to be medically stable to go home    Your healthcare provider and public health staff will evaluate whether you can be cared for at home. If it is determined that you do not need to be hospitalized and can be isolated at home, you will be monitored by staff from your local or state health department. You should follow the prevention steps below until a healthcare provider or local or state health department says you can return to your normal activities. Stay home except to get medical care  People who are mildly ill with COVID-19 are able to isolate at home during their illness. You should restrict activities outside your home, except for getting medical care. Do not go to work, school, or public areas. Avoid using public transportation, ride-sharing, or taxis. Separate yourself from other people and animals in your home  People: As much as possible, you should stay in a specific room and away from other people in your home.  Also, you should use a separate bathroom, if available. Animals: You should restrict contact with pets and other animals while you are sick with COVID-19, just like you would around other people. Although there have not been reports of pets or other animals becoming sick with COVID-19, it is still recommended that people sick with COVID-19 limit contact with animals until more information is known about the virus. When possible, have another member of your household care for your animals while you are sick. If you are sick with COVID-19, avoid contact with your pet, including petting, snuggling, being kissed or licked, and sharing food. If you must care for your pet or be around animals while you are sick, wash your hands before and after you interact with pets and wear a facemask. Call ahead before visiting your doctor  If you have a medical appointment, call the healthcare provider and tell them that you have or may have COVID-19. This will help the healthcare providers office take steps to keep other people from getting infected or exposed. Wear a facemask  You should wear a facemask when you are around other people (e.g., sharing a room or vehicle) or pets and before you enter a healthcare providers office. If you are not able to wear a facemask (for example, because it causes trouble breathing), then people who live with you should not stay in the same room with you, or they should wear a facemask if they enter your room. Cover your coughs and sneezes  Cover your mouth and nose with a tissue when you cough or sneeze. Throw used tissues in a lined trash can. Immediately wash your hands with soap and water for at least 20 seconds or, if soap and water are not available, clean your hands with an alcohol-based hand  that contains at least 60% alcohol.   Clean your hands often  Wash your hands often with soap and water for at least 20 seconds, especially after blowing your nose, coughing, or sneezing; going to the bathroom; and

## 2021-06-01 PROBLEM — N20.0 RENAL CALCULI: Status: ACTIVE | Noted: 2021-01-01

## 2021-06-01 NOTE — PRE SEDATION
Sedation Pre-Procedure Note    Patient Name: Flip Cardona   YOB: 1935  Room/Bed: OR Pool /NONE  Medical Record Number: [id number]  Date: 6/1/2021   Time: 8:55 AM       Indication: L nephrolithiasis    Consent: I have discussed with the patient and/or the patient representative the indication, alternatives, and the possible risks and/or complications of the planned procedure and the anesthesia methods. The patient and/or patient representative appear to understand and agree to proceed. Vital Signs:   Vitals:    06/01/21 0722   BP: (!) 145/75   Pulse: 89   Resp: 18   Temp: 98.5 °F (36.9 °C)   SpO2: 97%       Past Medical History:   has a past medical history of Back pain, Cancer ([address] Chronic kidney disease, Diabetes mellitus ([address] Essential tremor, History of BPH, Hypercholesteremia, Hypertension, Renal insufficiency, and Type 2 diabetes mellitus with hyperglycemia ([facility] [address] [facility] Past Surgical History:   has a past surgical history that includes hernia repair; Cataract removal; Cystocopy; and Kyphosis surgery (8/24/2012). Medications:   Scheduled Meds:    sodium chloride flush  10 mL Intravenous 2 times per day     Continuous Infusions:    lactated ringers 125 mL/hr at 06/01/21 0756    sodium chloride       PRN Meds: sodium chloride flush, sodium chloride, meperidine, HYDROmorphone, HYDROmorphone, morphine, morphine, oxyCODONE-acetaminophen **OR** oxyCODONE-acetaminophen, ondansetron, promethazine, diphenhydrAMINE, labetalol, hydrALAZINE  Home Meds:   Prior to Admission medications    Medication Sig Start Date End Date Taking?  Authorizing Provider   rosuvastatin (CRESTOR) 40 MG tablet TAKE ONE TABLET BY MOUTH DAILY 4/20/21  Yes ROXANA Angel - CNP   TOUJEO SOLOSTAR 300 UNIT/ML SOPN INJECT 10 UNITS UNDER THE SKIN DAILY 3/31/21  Yes Pema Alvares MD   tamsulosin (FLOMAX) 0.4 MG capsule Take 0.4 mg by mouth daily   Yes Historical Provider, MD   Cholecalciferol (VITAMIN D3) 2000 units CAPS Take by mouth   Yes Historical Provider, MD   escitalopram (LEXAPRO) 10 MG tablet TAKE ONE TABLET BY MOUTH DAILY 1/8/21   Noni Ley MD   B-D UF III MINI PEN NEEDLES 31G X 5 MM MISC USE ONE - DAILY 11/3/20   Noni Ley MD   fluorouracil (EFUDEX) 5 % cream Apply twice daily to scalp for 2 weeks 11/19/19   Ariela Sanchez MD   TRUE METRIX BLOOD GLUCOSE TEST strip USE TO TEST BLOOD SUGAR TWO TIMES A DAY FOR DIABETES 11/18/19   Noni Ley MD   acetaminophen (TYLENOL) 500 MG tablet Take 500 mg by mouth every 6 hours as needed for Pain    Historical Provider, MD   Blood Glucose Monitoring Suppl (TRUE METRIX AIR GLUCOSE METER) [place] E11.65 9/22/16   Noni Ley MD   Blood Glucose Monitoring Suppl (TRUE METRIX AIR GLUCOSE METER) W/DEVICE KIT Dispense Blood Glucose monitoring kit for diabetes ICD-10-CM: E11.65 9/13/16   Noni Ley MD   Lancets MISC Test one time daily 10/1/13   Noni Ley MD   aspirin 81 MG chewable tablet Take 81 mg by mouth daily. Historical Provider, MD   Pre-Sedation Documentation and Exam:   I have reviewed the patient's history and review of systems.     Sedation/ Anesthesia Plan:   General, see separate anesthesia note        Electronically signed by Ángela Hassan MD on 6/1/2021 at 8:55 AM

## 2021-06-01 NOTE — OP NOTE
Operative Note      Patient: Jameel Cash  YOB: 1935  MRN: [id number]    Date of Procedure: 6/1/2021    PREOPERATIVE DIAGNOSE: Large LEFT renal calculi  POSTOPERATIVE DIAGNOSE: same      OPERATION PERFORMED:  1. Cystourethroscopy, w/ ureteral catheterization. 2.  Percutaneous nephrolithotomy, greater than 2 cm  3. Balloon dilation of nephrostomy tract. 4.  Nephrostomy tube exchange, 10F drainage tube  5. Antegrade nephrostogram     SURGEON:  Damion Rivera MD.     ANESTHESIA:  General with an ET tube. Radiologist:  Dr. Polly Mast from Interventional Radiology     INDICATIONS:  The patient is a 80-year-old who was found to have a  large \renal stone, >2cm total stone burden. See CT, 2.3cm renal pelvis and multiple smaller lower pole stones (5-6mm each). The risks, benefits, and alternatives of treatment were explained to the patient (see office notes). The patient signed the consent prior to surgery. FINDINGS:    -lower pole entry by Interventional Radiology.    Rosio Life was completely cleared.  -Antegrade nephrostogram showed 10-Greek nephrostomy in place and no extravasation of contrast at end of case. OPERATIVE PROCEDURE:  The patient was brought to the IR suite,  Anesthesia was induced. The patient was prepped and draped and repositioned on the gurney. We advanced the flexible cystoscope through the urethra up into the bladder. We advanced the wire into the ureteral orifice followed by a balloon occlusion catheter. The catheter easily advanced up to the kidney. Next, we deployed a 16-F ramirez catheter alongside the catheter and secured them for the procedure and the ramirez catheter was left to gravity drainage. The patient was repositioned prone. Interventional Radiology achieved percutaneous access. See their separate dictation. Once IR doctor had the 6-7F catheter in the mid-ureter, we then transferred the patient to preop and then operating room.   Timeout re-performed and pt flank preppped and drapped in usual sterile fashion. A stab incision was made at flank site after local anesthetic was injected (10cc of 0.5% marcaine). Then 2 wires were advanced down the ureter under fluoroscopic guidance and the 6-7F catheter was backed off, after contrast was injected to opacify the collecting system. This makes it easier to ensure the balloon would enter the collecting system at the correct location in the collecting system. I advanced a balloon dilating NephroMax dilator into infundibula of entry. We balloon dilated the tract to 30-Bruneian under fluoroscopic guidance to ensure we were in the proper location near the stone and within the collecting system. We then advanced the nephrostomy sheath over the balloon under fluoroscopic guidance. We then used normal saline to irrigate the renal pelvis of any blood with red rubber catheter. We then advanced nephroscope and advanced a LithoClast lithotripsy device. We pulverized the stone and suctioned fragments as it pulverized. Once we broke up the main stone in the renal pelvis, we used a grasper to remove a couple large pieces that remained, it was a rather soft stone so most of it was removed via suction. I then used a flexible cystoscope to inspect all other calyx. We then performed antegrade nephrostogram and pyelogram confirmed no residual stone. I then advanced a 10-Bruneian nephrostomy catheter through the nephromax sheath. Next, we removed the sheath. We used a string to get a curl in the upper pole and then injected contrast to confirm location in collecting system and no extravasation. The ureteral catheter that was placed in the ureter for the IR team and that was removed in its entirety. Next, we closed the flank incision subcutaneous layer with a 2-0 Vicryl in an interrupted fashion. The nephrostomy was secured with 0-silk stitch to the skin and positioned to gravity drainage.  The patient was emerged from the anesthesia and brought to the PACU in stable condition. Local anesthetic was used at the flank incision site, prior to incision and at the end of the procedure. The site was dressed with gauze and tap. ESTIMATED BLOOD LOSS:  50 mL. IV FLUIDS:  1000mL crystalloid. URINE OUTPUT:  Not recorded, diluted from NS irrigation     SPECIMENS:  renal stones. DRAINS:  As above. DISPOSITION: The pt will be recovered in the PACU and dc home in 1-2 days if vitals, labs stable. Molina removed tomorrow or Thursday. Plan to remove the nephrostomy tube in 1-3 days, pending his clinical course.     Electronically signed by Aguilar Cody MD on 6/1/2021 at 11:55 AM

## 2021-06-01 NOTE — FLOWSHEET NOTE
06/01/21 1601   Vital Signs   Temp 97 °F (36.1 °C)   Temp Source Oral   Pulse 85   Heart Rate Source Monitor   Resp 18   /76   BP Location Left upper arm   Patient Position Semi fowlers   Level of Consciousness Alert (0)   MEWS Score 1   Oxygen Therapy   SpO2 95 %   O2 Device None (Room air)   Patient admitted to room _229_ from OR. Patient oriented to room, call light, bed rails, phone, lights and bathroom. Patient instructed about the schedule of the day including: vital sign frequency, lab draws, possible tests, frequency of MD and staff rounds, daily weights, I &O's and prescribed diet. Bed alarm deferred patient low fall risk and refuses alarm. Telemetry box in place, patient aware of placement and reason. Bed locked, in lowest position, side rails up 2/4, call light within reach. Recliner Assessment:     Patient is able to demonstrate the ability to move from a reclining position to an upright position within the recliner. 4 Eyes Skin Assessment     The patient is being assess for   Admission    I agree that 2 RN's have performed a thorough Head to Toe Skin Assessment on the patient. ALL assessment sites listed below have been assessed. Areas assessed for pressure by both nurses:   [x]   Head, Face, and Ears   [x]   Shoulders, Back, and Chest, Abdomen  [x]   Arms, Elbows, and Hands   [x]   Coccyx, Sacrum, and Ischium  [x]   Legs, Feet, and Heels        Scab to left shin. No other issues noted. Does the Patient have Skin Breakdown related to pressure? No          Mor Prevention initiated:  No   Wound Care Orders initiated:  No      [facility] nurse consulted for Pressure Injury (Stage 3,4, Unstageable, DTI, NWPT, Complex wounds)and New or Established Ostomies:  No      Primary Nurse eSignature: Electronically signed by Vannessa Coffey RN on 6/1/21 at 4:18 PM EDT      No pain noted. Up to [facility], asst x1.

## 2021-06-01 NOTE — CARE COORDINATION
Attempted meeting with pt at bedside but he was being seen by another provider. CM will attempt again when able. Please notify CM if needs or concerns arise.

## 2021-06-01 NOTE — PROGRESS NOTES
4 Eyes Skin Assessment   Four eyes skin assessment completed at this time by Shyam Ibarra RN   and SANA Vuong. Assessment revealed: Intact skin    The patient is being assessed for  Admission redness, rashes,sores, and abrasions to pt skin. Scabbed area to left knee     I agree that 2 RN's have performed a thorough Head to Toe Skin Assessment on the patient. ALL assessment sites listed below have been assessed.        Areas assessed by both nurses:  [x]   Head, Face, and Ears   [x]   Shoulders, Back, and Chest  [x]   Arms, Elbows, and Hands   [x]   Coccyx, Sacrum, and Ischum  [x]   Legs, Feet, and Heels              Shyam Ibarra RN

## 2021-06-01 NOTE — ANESTHESIA PRE PROCEDURE
Department of Anesthesiology  Preprocedure Note       Name:  Morgan Lopez   Age:  81 y.o.  :  1935                                          MRN:  [id number]         Date:  2021      Surgeon: Giuseppe Ricks):  Sosa Pickard MD    Procedure: Procedure(s):  LEFT PERCUTANEOUS NEPHROLITHOTOMY, POSSIBLE CYSTOSCOPY, POSSIBLE LEFT STENT PLACEMENT    Medications prior to admission:   Prior to Admission medications    Medication Sig Start Date End Date Taking? Authorizing Provider   rosuvastatin (CRESTOR) 40 MG tablet TAKE ONE TABLET BY MOUTH DAILY 21  Yes Chad Ding, APRN - CNP   TOUJEO SOLOSTAR 300 UNIT/ML SOPN INJECT 10 UNITS UNDER THE SKIN DAILY 3/31/21  Yes Leetta Gottron, MD   tamsulosin (FLOMAX) 0.4 MG capsule Take 0.4 mg by mouth daily   Yes Historical Provider, MD   Cholecalciferol (VITAMIN D3) 2000 units CAPS Take by mouth   Yes Historical Provider, MD   escitalopram (LEXAPRO) 10 MG tablet TAKE ONE TABLET BY MOUTH DAILY 21   Leetta Gottron, MD   B-D UF III MINI PEN NEEDLES 31G X 5 MM MISC USE ONE - DAILY 11/3/20   Leetta Gottron, MD   fluorouracil (EFUDEX) 5 % cream Apply twice daily to scalp for 2 weeks 19   Gifty Petersen MD   TRUE METRIX BLOOD GLUCOSE TEST strip USE TO TEST BLOOD SUGAR TWO TIMES A DAY FOR DIABETES 19   Leetta Gottron, MD   acetaminophen (TYLENOL) 500 MG tablet Take 500 mg by mouth every 6 hours as needed for Pain    Historical Provider, MD   Blood Glucose Monitoring Suppl (TRUE METRIX AIR GLUCOSE METER) Shavon Ricketts E11.65 16   Leetta Gottron, MD   Blood Glucose Monitoring Suppl (TRUE METRIX AIR GLUCOSE METER) W/DEVICE KIT Dispense Blood Glucose monitoring kit for diabetes ICD-10-CM: E11.65 16   Leetta Gottron, MD   Lancets MISC Test one time daily 10/1/13   Leetta Gottron, MD   aspirin 81 MG chewable tablet Take 81 mg by mouth daily.       Historical Provider, MD       Current medications:    Current Facility-Administered Medications   Medication Dose Route Frequency Provider Last Rate Last Admin    lactated ringers infusion   Intravenous Continuous Vincenzo Flynn MD        sodium chloride flush 0.9 % injection 10 mL  10 mL Intravenous 2 times per day Vincenzo Flynn MD        sodium chloride flush 0.9 % injection 10 mL  10 mL Intravenous PRN Vincenzo Flynn MD        0.9 % sodium chloride infusion  25 mL Intravenous PRN Vincenzo Flynn MD        famotidine (PEPCID) injection 20 mg  20 mg Intravenous Once Vincenzo Flynn MD        ceFAZolin (ANCEF) 2000 mg in sterile water 20 mL IV syringe  2,000 mg Intravenous Once Isabella Tovar MD           Allergies:  No Known Allergies    Problem List:    Patient Active Problem List   Diagnosis Code    MVP (mitral valve prolapse) I34.1    Hyperlipemia E78.5    BPH (benign prostatic hyperplasia) N40.0    IGT (impaired glucose tolerance) J81.31    Dysmetabolic syndrome X H93.33    Benign essential tremor G25.0    HTN (hypertension) I10    Hematuria     Renal insufficiency N28.9    Diabetes mellitus ([address] E11.9    Murmur, cardiac R01.1    Thoracic compression fracture (HCC) S22.000A    Bradycardia R00.1    Type 2 diabetes mellitus with hyperglycemia (HCC) E11.65    Right wrist pain M25.531    Abrasion of right forearm S50.811A    Abrasion of left forearm S50.812A    Lymphocytosis D72.820    Chronic kidney disease, stage III (moderate) N18.30    Essential tremor G25.0    DM (diabetes mellitus), secondary, uncontrolled, w/neurologic complic (HCC) V43.66, V20.62    HTN (hypertension), benign I10    Dyslipidemia E78.5    Chronic kidney disease (CKD), stage IV (severe) (HCC) N18.4       Past Medical History:        Diagnosis Date    Back pain     Cancer ([address]     skin    Chronic kidney disease     Diabetes mellitus ([address]     Essential tremor     History of BPH     Hypercholesteremia     Hypertension     Renal insufficiency     Type 2 diabetes mellitus with hyperglycemia ([facility] 2017       Past Surgical History:        Procedure Laterality Date    CATARACT REMOVAL      CYSTOSCOPY      HERNIA REPAIR      KYPHOSIS SURGERY  2012    KYPHOPLASTY T10 AND T12       Social History:    Social History     Tobacco Use    Smoking status: Former Smoker     Packs/day: 1.00     Years: 10.00     Pack years: 10.00     Types: Cigarettes     Quit date: 3/19/1978     Years since quittin.2    Smokeless tobacco: Never Used    Tobacco comment: quit 25 years ago   Substance Use Topics    Alcohol use: Yes     Comment: on occasion                                Counseling given: Not Answered  Comment: quit 25 years ago      Vital Signs (Current):   Vitals:    21 1254 21 0722   BP:  (!) 145/75   Pulse:  89   Resp:  18   Temp:  98.5 °F (36.9 °C)   TempSrc:  Oral   SpO2:  97%   Weight: 140 lb (63.5 kg) 138 lb (62.6 kg)   Height: 6' 7\" (2.007 m) 5' 4\" (1.626 m)                                              BP Readings from Last 3 Encounters:   21 (!) 145/75   21 132/74   21 118/76       NPO Status: Time of last liquid consumption: 0000                        Time of last solid consumption: 0000                        Date of last liquid consumption: 21                        Date of last solid food consumption: 21    BMI:   Wt Readings from Last 3 Encounters:   21 138 lb (62.6 kg)   21 145 lb (65.8 kg)   21 143 lb (64.9 kg)     Body mass index is 23.69 kg/m².     CBC:   Lab Results   Component Value Date    WBC 47.2 2021    RBC 4.97 2021    HGB 14.6 2021    HCT 45.0 2021    MCV 90.7 2021    RDW 15.8 2021     2021       CMP:   Lab Results   Component Value Date     2021    K 4.7 2021     2021    CO2 25 2021    BUN 29 2021    CREATININE 2.1 2021    GFRAA 37 2021    GFRAA 59 2013    FABIOLAATIO 2.2 01/07/2021    LABGLOM 30 05/07/2021    GLUCOSE 195 05/07/2021    PROT 6.7 01/07/2021    PROT 6.6 09/05/2012    CALCIUM 9.3 05/07/2021    BILITOT 0.6 01/07/2021    ALKPHOS 40 01/07/2021    AST 26 01/07/2021    ALT 18 01/07/2021       POC Tests: No results for input(s): POCGLU, POCNA, POCK, POCCL, POCBUN, POCHEMO, POCHCT in the last 72 hours. Coags:   Lab Results   Component Value Date    PROTIME 10.2 08/24/2012    INR 0.89 08/24/2012       HCG (If Applicable): No results found for: PREGTESTUR, PREGSERUM, HCG, HCGQUANT     ABGs: No results found for: PHART, PO2ART, HAL4NWZ, UIG0WSS, BEART, T6EZWLRT     Type & Screen (If Applicable):  No results found for: LABABO, LABRH    Drug/Infectious Status (If Applicable):  No results found for: HIV, HEPCAB    COVID-19 Screening (If Applicable):   Lab Results   Component Value Date    COVID19 Not Detected 05/25/2021           Anesthesia Evaluation  Patient summary reviewed and Nursing notes reviewed  Airway: Mallampati: I  TM distance: >3 FB   Neck ROM: full  Mouth opening: > = 3 FB Dental: normal exam         Pulmonary:Negative Pulmonary ROS and normal exam  breath sounds clear to auscultation                             Cardiovascular:    (+) hypertension: mild, valvular problems/murmurs: no interval change,         Rhythm: regular  Rate: normal                    Neuro/Psych:   Negative Neuro/Psych ROS              GI/Hepatic/Renal:   (+) renal disease: CRI,           Endo/Other:    (+) DiabetesType II DM, , .                 Abdominal:           Vascular: negative vascular ROS. Anesthesia Plan      general     ASA 3       Induction: intravenous. MIPS: Postoperative opioids intended and Prophylactic antiemetics administered. Anesthetic plan and risks discussed with patient. Plan discussed with CRNA.                   Dg Santana MD   6/1/2021

## 2021-06-01 NOTE — PROGRESS NOTES
Transport here to take pt to room #229. Pt in stable condition at this time. Pt denies pain or any needs at this time.

## 2021-06-01 NOTE — PROGRESS NOTES
Cefazolin Day 1    Recent Labs     06/01/21  0753   CREATININE 1.8*       Estimated Creatinine Clearance: 26 mL/min (A) (based on SCr of 1.8 mg/dL (H)). Recent Labs     06/01/21  0753   WBC 39.2*     Dose decreased from 1 g q8h to 1 g q12h due to renal function. Pt received 2 g dose at approximately 0800 on 6/1/21. Due for 3 more doses. Pharmacy will continue to monitor.    Teo TaveraD, [facility], 6/1/2021 3:06 PM

## 2021-06-01 NOTE — ANESTHESIA POSTPROCEDURE EVALUATION
Department of Anesthesiology  Postprocedure Note    Patient: Jameel Cash  MRN: [id number]  YOB: 1935  Date of evaluation: 6/1/2021  Time:  1:34 PM     Procedure Summary     Date: 06/01/21 Room / Location: [facility] OR [address] [place] [address] OR [facility] ROOM [facility]    Anesthesia Start: 6997 Anesthesia Stop: 8812    Procedures:       cystoscopy, ramirez catheter placement (Left Bladder)      NEPHROSTOMY PLACEMENT (Left Pelvis)      LEFT PERCUTANEOUS NEPHROLITHOTOMY (Left Back) Diagnosis:       (X)      (HYDRONEPHROSIS WITH RENAL AND URETERAL CALCULUS OBSTRUCTION)    Surgeons: Damion Rivera MD; Polly Mast MD Responsible Provider: Elizabet Gunn MD    Anesthesia Type: general ASA Status: 3          Anesthesia Type: general    Twan Phase I: Twan Score: 10    Twan Phase II:      Last vitals: Reviewed and per EMR flowsheets.        Anesthesia Post Evaluation    Patient location during evaluation: PACU  Patient participation: complete - patient participated  Level of consciousness: awake and alert  Pain score: 0  Airway patency: patent  Nausea & Vomiting: no nausea and no vomiting  Complications: no  Cardiovascular status: blood pressure returned to baseline  Respiratory status: acceptable  Hydration status: stable

## 2021-06-01 NOTE — PROGRESS NOTES
Max dose of 10 mg day if CrCl is <30. Current CrCl is 26. Normal dose is 40 mg daily. Dose will be decreased to 10 mg daily until renal function improves. Pharmacy will continue to monitor.    Eve Miller PharmD, [facility], 6/1/2021 3:11 PM

## 2021-06-02 NOTE — CARE COORDINATION
Case Management Assessment  Initial Evaluation      Patient Name: Chad Tate  YOB: 1935  Diagnosis: Renal calculi [N20.0]  Date / Time: 6/1/2021  6:53 AM    Admission status/Date:  06/01/2021  Chart Reviewed: Yes      Patient Interviewed: Yes   Family Interviewed:  No      Hospitalization in the last 30 days:  No      Health Care Decision Maker :     (CM - must 1st enter selection under Navigator - emergency contact- Health Care Decision Maker Relationship and pick relationship)   Who do you trust or have selected to make healthcare decisions for you      Met with: patient  Interview conducted  (bedside/phone): bedside    Current PCP: Jan Lee MD    Financial  Medicare/[facility]  Precert required for SNF : NA        3 night stay required - WAIVED    ADLS  Support Systems/Care Needs: Children, Friends/Neighbors  Transportation: self    Meal Preparation: self/grand son    Housing  Living Arrangements:  3rd floorsenior apartment  Steps: Elevator access/uses steps  Intent for return to present living arrangements: Yes  Identified Issues: NA    Home Care Information  Active with [address] : No Agency:(Services)  Type of Home Care Services: None  Passport/Waiver : No  :                      Phone Number:    Passport/Waiver Services: NA          Durable Medical Equiptment   DME Provider: NARDA  Equipment: NA  Walker___Cane___RTS___ BSC___Shower Chair___Hospital Bed___W/C____Other________  02 at ____Liter(s)---wears(frequency)_______ HHN ___ CPAP___ BiPap___   N/A____      Home O2 Use :  No      Informed of need for care provider to bring portable home O2 tank on day of discharge for nursing to connect prior to leaving:   Not Indicated  Verbalized agreement/Understanding:   Not Indicated    Community Service Affiliation  Dialysis:  No    · Agency:  · Location:  · Dialysis Schedule:  · Phone:   · Fax:     Other Community Services: (ex:PT/OT,Mental Health,Wound Clinic, Cardio/Pul Rehab,Pain [address])    DISCHARGE PLAN: Explained Case Management role/services. Chart reviewed. Met with patient at bedside and explained the role of the CM. Plans to return home. FARSHADA. Kaykay Garcia will pick him up. aDenies any new HHC or DME notes. Anticipate DC later today.

## 2021-06-02 NOTE — PROGRESS NOTES
Spoke with Dr. Frandy Espino regarding pt. Updated him on how he is doing. Per Dr. Frandy Espino, it is ok to change dressing to left nephrostomy tube and it is ok to removed ramirez if pt would like it out. Per MD, Dr. Itzel Ac, will be in to check on pt later today.

## 2021-06-02 NOTE — FLOWSHEET NOTE
06/1935   Vital Signs   Temp 97 °F (36.1 °C)   Temp Source Oral   Pulse 88   Heart Rate Source Monitor   Resp 16   /77   BP Location Left upper arm   Patient Position High fowlers   Level of Consciousness Alert (0)   MEWS Score 1   Patient Currently in Pain No   Oxygen Therapy   SpO2 96 %   O2 Device None (Room air)   Pt A/O x4 assessment completed. Meds given per MAR. Pt denies any pain. Molina draining to gravity. Left nephrostomy tube noted. Both red color output. Pt denies any needs at this time.  Call light within reach

## 2021-06-02 NOTE — PLAN OF CARE
Problem: Infection:  Goal: Will remain free from infection  Description: Will remain free from infection  Outcome: Ongoing     Problem: Daily Care:  Goal: Daily care needs are met  Description: Daily care needs are met  Outcome: Ongoing     Problem: Pain:  Goal: Patient's pain/discomfort is manageable  Description: Patient's pain/discomfort is manageable  Outcome: Ongoing     Problem: Discharge Planning:  Goal: Patients continuum of care needs are met  Description: Patients continuum of care needs are met  Outcome: Ongoing

## 2021-06-03 NOTE — PROGRESS NOTES
Urology Progress Note    Subjective: I am ready . HPI: Patient has been admitted for a left PCNL. P/E  /72   Pulse 70   Temp 98.1 °F (36.7 °C) (Oral)   Resp 16   Ht 5' 4\" (1.626 m)   Wt 138 lb (62.6 kg)   SpO2 95%   BMI 23.69 kg/m²   Skin: Intact  Respiratory: Breathing without difficulty, stable. GI: No acute changes, stable po intake. : Neph tube with blood tinged urine, no clots. MSK: No acute changes, stable. Neuro: No acute changes, stable. Labs  Lab Results   Component Value Date    WBC 44.8 06/02/2021    HGB 12.4 06/02/2021    HCT 38.4 06/02/2021    MCV 92.0 06/02/2021    PLT 95 06/02/2021     Lab Results   Component Value Date     06/02/2021    K 4.9 06/02/2021     06/02/2021    CO2 22 06/02/2021    BUN 31 06/02/2021    CREATININE 2.0 06/02/2021    CALCIUM 9.0 06/02/2021       Assessment/Plan  Overall stable. Neph tube removed. Home today.     Electronically signed by Feng Judd MD on 6/3/2021 at 7:58 AM

## 2021-06-03 NOTE — CARE COORDINATION
DISCHARGE ORDER  Date/Time 6/3/2021 2:14 PM  Completed by: Sulma Bonilla RN, Case Management    Patient Name: Misha Landrum      : 1935  Admitting Diagnosis: Renal calculi [N20.0]      Admit order Date and Status: 21 inpt  (verify MD's last order for status of admission)      Noted discharge order. If applicable PT/OT recommendation at Discharge: N/A  DME recommendation by PT/OT:N/A  Confirmed discharge plan   Yes  with whom patient  If pt confirmed DC plan does family need to be contacted by CM No  Discharge Plan:  Order for dc noted. Spoke with pt who cont plan to return home. Florence Moran grandpriya will  and take home.  grandson will be staying with him. Discussed HHC and pt declines    Reviewed chart. Role of discharge planner explained and patient verbalized understanding. Discharge order is noted. Has Home O2 in place on admit:  No  Informed of need to bring portable home O2 tank on day of discharge for nursing to connect prior to leaving:   Not Indicated  Verbalized agreement/Understanding:   Not Indicated  Pt is being d/c'd to home today. Pt's O2 sats are 96% on RA. Discharge timeout done with  Nsg, CM and pt. All discharge needs and concerns addressed.

## 2021-06-03 NOTE — PROGRESS NOTES
Dr. Carol Bagley removed left nephrostomy tube. Pink tinged drainage present. Patient tolerated well. Covered with bandage.

## 2021-06-03 NOTE — PLAN OF CARE
Problem: Infection:  Goal: Will remain free from infection  Description: Will remain free from infection  6/2/2021 2339 by Aziza Gottlieb RN  Outcome: Ongoing  6/2/2021 1658 by Matteo Hankins RN  Outcome: Ongoing     Problem: Daily Care:  Goal: Daily care needs are met  Description: Daily care needs are met  6/2/2021 2339 by Aziza Gottlieb RN  Outcome: Ongoing  6/2/2021 1658 by Matteo Hankins RN  Outcome: Ongoing     Problem: Pain:  Goal: Patient's pain/discomfort is manageable  Description: Patient's pain/discomfort is manageable  6/2/2021 2339 by Aziza Gottlieb RN  Outcome: Ongoing  6/2/2021 1658 by Matteo Hankins RN  Outcome: Ongoing     Problem: Discharge Planning:  Goal: Patients continuum of care needs are met  Description: Patients continuum of care needs are met  6/2/2021 2339 by Aziza Gottlieb RN  Outcome: Ongoing  6/2/2021 1658 by Matteo Hankins RN  Outcome: Ongoing

## 2021-06-03 NOTE — PROGRESS NOTES
Pts dressing to back side soaked through earlier placed reinforcement. New reinforcement dressing placed again. Pt denies any pain or needs.  Call light within reach and bed alarm on

## 2021-06-03 NOTE — PROGRESS NOTES
Bedside report given to [facility] RN  pt in stable condition no needs at this time.  Call light within reach

## 2021-06-03 NOTE — PROGRESS NOTES
Urology Progress Note    Subjective: I feel good. HPI: Patient has been admitted for a PCNL. P/E  /68   Pulse 65   Temp 97.2 °F (36.2 °C) (Oral)   Resp 16   Ht 5' 4\" (1.626 m)   Wt 138 lb (62.6 kg)   SpO2 96%   BMI 23.69 kg/m²   Skin: Intact  Respiratory: Breathing without difficulty, stable. GI: No acute changes, stable po intake. : voiding, cath out. MSK: No acute changes, stable. Neuro: No acute changes, stable. Labs  Lab Results   Component Value Date    WBC 44.8 06/02/2021    HGB 12.4 06/02/2021    HCT 38.4 06/02/2021    MCV 92.0 06/02/2021    PLT 95 06/02/2021     Lab Results   Component Value Date     06/02/2021    K 4.9 06/02/2021     06/02/2021    CO2 22 06/02/2021    BUN 31 06/02/2021    CREATININE 2.0 06/02/2021    CALCIUM 9.0 06/02/2021       Assessment/Plan  Overall stable. Likely home tomorrow.     Electronically signed by Travis Downey MD on 6/2/2021 at 9:58 PM

## 2021-06-03 NOTE — FLOWSHEET NOTE
06/02/21 1933   Vital Signs   Temp 97.2 °F (36.2 °C)   Temp Source Oral   Pulse 65   Heart Rate Source Monitor   Resp 16   /68   BP Location Left upper arm   Patient Position Semi fowlers   Level of Consciousness Alert (0)   MEWS Score 1   Oxygen Therapy   SpO2 96 %   O2 Device None (Room air)   Pt A/O x 4 assessment completed. Dressing reinforced on left lower back from nephrostomy tube, Pt wiped down with chlorhexidine wipes. Meds given per MAR. Pt denies any needs.  [place] light within reach

## 2021-06-03 NOTE — PROGRESS NOTES
Bandage over incision from nephrostomy tube was saturated. Writer replaced new bandage, clean, dry and intact at discharge.

## 2021-06-03 NOTE — DISCHARGE SUMMARY
Physician Discharge Summary        Patient ID:  Morgan Lopez  [id number]  81 y.o.  1935    Admit date: 6/1/2021    Discharge date and time: No discharge date for patient encounter. Admitting Physician: oSsa Pickard MD     Discharge Physician: Curt Dunaway MD    Admission Diagnoses: Renal calculi [N20.0]    Discharge Diagnoses: Renal calculi [N20.0]    Discharged Condition: good      Hospital Course: Patient admitted for renal colic and a kidney stone. Stone management protocal was used and the patient is recovering. Management included PCNL surgical removal conservative management. Patient with successful voiding trial prior to discharge. Instructions for post op care and follow up were reviewed. Physical exam at the time of discharge was unremarkable, no post operative complications noted. Discharge Exam:   Physical exam at the time of discharge was unremarkable, no post operative complications noted.      Disposition: home    Patient Instructions:      Medication List      ASK your doctor about these medications    acetaminophen 500 MG tablet  Commonly known as: TYLENOL     aspirin 81 MG chewable tablet     B-D UF III MINI PEN NEEDLES 31G X 5 MM Misc  Generic drug: Insulin Pen Needle  USE ONE - DAILY     escitalopram 10 MG tablet  Commonly known as: LEXAPRO  TAKE ONE TABLET BY MOUTH DAILY     fluorouracil 5 % cream  Commonly known as: Efudex  Apply twice daily to scalp for 2 weeks     Lancets Misc  Test one time daily     rosuvastatin 40 MG tablet  Commonly known as: CRESTOR  TAKE ONE TABLET BY MOUTH DAILY     tamsulosin 0.4 MG capsule  Commonly known as: FLOMAX     Toujeo SoloStar 300 UNIT/ML Sopn  Generic drug: Insulin Glargine (1 Unit Dial)  INJECT 10 UNITS UNDER THE SKIN DAILY     * True Metrix Air Glucose Meter w/Device Kit  Dispense Blood Glucose monitoring kit for diabetes ICD-10-CM: E11.65     * True Metrix Air Glucose Meter Sarai  E11.65     True Metrix Blood Glucose Test strip  Generic drug: blood glucose test strips  USE TO TEST BLOOD SUGAR TWO TIMES A DAY FOR DIABETES     Vitamin D3 50 MCG (2000 UT) Caps         * This list has 2 medication(s) that are the same as other medications prescribed for you. Read the directions carefully, and ask your doctor or other care provider to review them with you. Activity: no lifting, or Strenuous exercise for 1 week. Diet: regular diet    Follow-up with Dr. Cristal Alfaro in 2-3 weeks.     Signed:  Licha Mckeon MD  6/3/2021  7:59 AM

## 2021-06-04 NOTE — CARE COORDINATION
Did[facility] Transitions Initial Follow Up Call    Call within 2 business days of discharge: Yes    Patient: Karl Escobedo Patient : 1935   MRN: [id number]  Reason for Admission: renal calculi  Discharge Date: 6/3/21 RARS: Readmission Risk Score: 14      Last Discharge [facility]       Complaint Diagnosis Description Type Department Provider    21  Renal calculi Admission (Discharged) [facility] 2 Mela Singleton MD        Was this an external facility discharge? No Discharge Facility: NA    COVID-19 Not Detected on 2021. Patient completed Ramirez Raman 2-step COVID vaccination on 2021.    1st attempt - Unable to reach patient by phone at this time. Message left requesting return call.        Follow Up  Future Appointments   Date Time Provider Kacy Romo   2021  9:20 AM ORXANA Manning - CNP [facility] AND DIOGO Cabrales RN

## 2021-06-07 NOTE — CARE COORDINATION
[facility] Transitions Initial Follow Up Call    Call within 2 business days of discharge: Yes    Patient: Karl Escobedo Patient : 1935   MRN: [id number]  Reason for Admission: renal calculi  Discharge Date: 6/3/21 RARS: Readmission Risk Score: 14      Last Discharge [address] [address]       Complaint Diagnosis Description Type Department Provider    21  Renal calculi Admission (Discharged) [facility] 2 Mela Singleton MD           Was this an external facility discharge? No Discharge Facility: NA    2nd attempt - Unable to reach patient by phone at this time. Message left requesting return call. No further CTN outreach scheduled.        Follow Up  Future Appointments   Date Time Provider Kacy Romo   2021  9:20 AM ROXANA Manning - CNP [facility]IOGO Cabrales RN

## 2021-06-29 NOTE — PATIENT INSTRUCTIONS
Personalized Preventive Plan for Nuria López - 6/29/2021  Medicare offers a range of preventive health benefits. Some of the tests and screenings are paid in full while other may be subject to a deductible, co-insurance, and/or copay. Some of these benefits include a comprehensive review of your medical history including lifestyle, illnesses that may run in your family, and various assessments and screenings as appropriate. After reviewing your medical record and screening and assessments performed today your provider may have ordered immunizations, labs, imaging, and/or referrals for you. A list of these orders (if applicable) as well as your Preventive Care list are included within your After Visit Summary for your review. Other Preventive Recommendations:    · A preventive eye exam performed by an eye specialist is recommended every 1-2 years to screen for glaucoma; cataracts, macular degeneration, and other eye disorders. · A preventive dental visit is recommended every 6 months. · Try to get at least 150 minutes of exercise per week or 10,000 steps per day on a pedometer . · Order or download the FREE \"Exercise & Physical Activity: Your Everyday Guide\" from [employer]. Call 4-997.172.7876 or search [employer] online. · You need 0566-5569 mg of calcium and 4042-9706 IU of vitamin D per day. It is possible to meet your calcium requirement with diet alone, but a vitamin D supplement is usually necessary to meet this goal.  · When exposed to the sun, use a sunscreen that protects against both UVA and UVB radiation with an SPF of 30 or greater. Reapply every 2 to 3 hours or after sweating, drying off with a towel, or swimming. · Always wear a seat belt when traveling in a car. Always wear a helmet when riding a bicycle or motorcycle.     Update tetanus booster at pharmacy  Obtain both shinigrix injections at pharmacy

## 2021-06-29 NOTE — PROGRESS NOTES
Medicare Annual Wellness Visit  Name: Caity Mckinney Date: 2021   MRN: <[id number]> Sex: Male   Age: 81 y.o. Ethnicity: Non-/Non    : 1935 Race: Asha Castrejon is here for Medicare AWV    Screenings for behavioral, psychosocial and functional/safety risks, and cognitive dysfunction are all negative except as indicated below. These results, as well as other patient data from the [address] form, are documented in Flowsheets linked to this Encounter. No Known Allergies    Prior to Visit Medications    Medication Sig Taking? Authorizing Provider   docusate sodium (COLACE) 100 MG capsule Take 1 capsule by mouth 2 times daily Yes Ken Ford MD   rosuvastatin (CRESTOR) 40 MG tablet TAKE ONE TABLET BY MOUTH DAILY Yes ROXANA Harris - CNP   TOUJEO SOLOSTAR 300 UNIT/ML SOPN INJECT 10 UNITS UNDER THE SKIN DAILY Yes Roe Delgado MD   escitalopram (LEXAPRO) 10 MG tablet TAKE ONE TABLET BY MOUTH DAILY Yes MD CAESAR Petit-MOHAN UF III MINI PEN NEEDLES 31G X 5 MM MISC USE ONE - DAILY Yes Roe Delgado MD   TRUE METRIX BLOOD GLUCOSE TEST strip USE TO TEST BLOOD SUGAR TWO TIMES A DAY FOR DIABETES Yes Roe Delgado MD   tamsulosin (FLOMAX) 0.4 MG capsule Take 0.4 mg by mouth daily Yes Historical Provider, MD   acetaminophen (TYLENOL) 500 MG tablet Take 500 mg by mouth every 6 hours as needed for Pain Yes Historical Provider, MD   Cholecalciferol (VITAMIN D3) 2000 units CAPS Take by mouth Yes Historical Provider, MD   Blood Glucose Monitoring Suppl (TRUE METRIX AIR GLUCOSE METER) Kate Yates E11.65 Yes Roe Delgado MD   Blood Glucose Monitoring Suppl (TRUE METRIX AIR GLUCOSE METER) W/DEVICE KIT Dispense Blood Glucose monitoring kit for diabetes ICD-10-CM: E11.65 Yes Roe Delgado MD   Lancets MISC Test one time daily Yes Roe Delgado MD   aspirin 81 MG chewable tablet Take 81 mg by mouth daily.    Yes Historical Provider, MD       Past Medical History:   Diagnosis Date    Back pain     Cancer ([facility] [address]     skin    Chronic kidney disease     Diabetes mellitus ([address]     Essential tremor     History of BPH     Hypercholesteremia     Renal insufficiency     Type 2 diabetes mellitus with hyperglycemia ([facility] [address] 12/21/2017       Past Surgical History:   Procedure Laterality Date    CATARACT REMOVAL      CYSTOSCOPY      HERNIA REPAIR  1959    KIDNEY STONE REMOVAL Left 6/1/2021    LEFT PERCUTANEOUS NEPHROLITHOTOMY performed by Mortimer Robinson, MD at [address]  8/24/2012    KYPHOPLASTY T10 AND T12    NEPHROSTOMY Left 6/1/2021    cystoscopy, ramirez catheter placement performed by Mortimer Robinson, MD at [address] [facility] NEPHROSTOMY Left 6/1/2021    NEPHROSTOMY [facility]AC[facility] [address] [facility]    cystoscopy, ramirez catheter placement (Left Bladder) nephrostomy placement left pelvis left percutaneous nephrolithotomy       Family History   Problem Relation Age of Onset    Heart Disease Father     Heart Disease Sister        CareTeam (Including outside providers/suppliers regularly involved in providing care):   Patient Care Team:  Nila Vaca MD as PCP - Lizet Brown MD as PCP - [facility]  Mechelle Goff MD as Consulting Physician (Urology)  Bharti Banasl MD as Consulting Physician (Nephrology)  Namrata Lang MD (Orthopedic Surgery)  Nila Vaca MD as Consulting Physician (Internal Medicine)    Wt Readings from Last 3 Encounters:   06/29/21 143 lb (64.9 kg)   06/01/21 138 lb (62.6 kg)   05/14/21 145 lb (65.8 kg)     Vitals:    06/29/21 0918   BP: 118/64   Pulse: 92   Temp: 97.4 °F (36.3 °C)   SpO2: 94%   Weight: 143 lb (64.9 kg)   Height: 5' 6.5\" (1.689 m)     Body mass index is 22.74 kg/m². Based upon direct observation of the patient, evaluation of cognition reveals recent and remote memory intact.   Word recall 3/3   CDT wnl    General Appearance: alert and oriented to person, place and time, well developed and well- nourished, in no acute distress  Skin: warm and dry, no rash or erythema  Head: normocephalic and atraumatic  Eyes: pupils equal, round, and reactive to light, extraocular eye movements intact, conjunctivae normal  ENT: tympanic membrane, external ear and ear canal normal bilaterally, nose without deformity, nasal mucosa and turbinates normal without polyps  Neck: supple and non-tender without mass, no thyromegaly or thyroid nodules, no cervical lymphadenopathy  Pulmonary/Chest: clear to auscultation bilaterally- no wheezes, rales or rhonchi, normal air movement, no respiratory distress  Cardiovascular: normal rate, regular rhythm, normal S1 and S2, no murmurs, rubs, clicks, or gallops, distal pulses intact, no carotid bruits  Abdomen: soft, non-tender, non-distended, normal bowel sounds, no masses or organomegaly  Extremities: no cyanosis, clubbing or edema  Musculoskeletal: normal range of motion, no joint swelling, deformity or tenderness  Neurologic: reflexes normal and symmetric, no cranial nerve deficit, gait, coordination and speech normal    Patient's complete Health Risk Assessment and screening values have been reviewed and are found in Flowsheets. The following problems were reviewed today and where indicated follow up appointments were made and/or referrals ordered.     Positive Risk Factor Screenings with Interventions:            Hearing/Vision:  No exam data present  Hearing/Vision  Do you or your family notice any trouble with your hearing that hasn't been managed with hearing aids?: (!) Yes  Do you have difficulty driving, watching TV, or doing any of your daily activities because of your eyesight?: No  Have you had an eye exam within the past year?: Yes  Hearing/Vision Interventions:  · Hearing concerns:  pt has plans to get hearing aid for left ear    Safety:  Safety  Do you have working smoke detectors?: Yes  Have all throw rugs been removed or fastened?: Yes  Do you have non-slip mats or surfaces in all bathtubs/showers?: (!) No  Do all of your stairways have a railing or banister?: Yes  Are your doorways, halls and stairs free of clutter?: Yes  Do you always fasten your seatbelt when you are in a car?: Yes  Safety Interventions:  · Patient declines any further evaluation/treatment for this issue  · states surface is non skid     Personalized Preventive Plan   Current Health Maintenance Status  Immunization History   Administered Date(s) Administered    COVID-19, Moderna, PF, 100mcg/0.5mL 01/20/2021    DTaP 10/24/2006    Influenza 10/03/2012, 09/25/2013    Influenza, High Dose (Fluzone 65 yrs and older) 12/17/2015, 12/23/2016, 12/21/2017    Influenza, Quadv, adjuvanted, 65 yrs +, IM, PF (Fluad) 09/22/2020    Influenza, Triv, inactivated, subunit, adjuvanted, IM (Fluad 65 yrs and older) 10/29/2019    Pneumococcal Conjugate 13-valent ([id number]) 12/17/2015    Pneumococcal Conjugate 7-valent (Prevnar7) 10/10/2005    Pneumococcal Polysaccharide ([id number]) 06/29/2017        Health Maintenance   Topic Date Due    Shingles Vaccine (1 of 2) Never done    DTaP/Tdap/Td vaccine (2 - Tdap) 10/24/2016    Diabetic retinal exam  05/21/2019    Annual Wellness Visit (AWV)  Never done    A1C test (Diabetic or Prediabetic)  07/07/2021    Diabetic foot exam  09/22/2021    Lipid screen  01/07/2022    Potassium monitoring  06/02/2022    Creatinine monitoring  06/02/2022    Flu vaccine  Completed    Pneumococcal 65+ yrs at Risk Vaccine  Completed    COVID-19 Vaccine  Completed    Hepatitis A vaccine  Aged Out    Hib vaccine  Aged Out    Meningococcal (ACWY) vaccine  Aged Out     Recommendations for Humagade Due: see orders and patient instructions/AVS.  .   Recommended screening schedule for the next 5-10 years is provided to the patient in written form: see Patient Instructions/AVS.    1. Routine general medical examination at a health care facility  I have reviewed the patient's medical history in detail and updated the computerized patient record. Repeat AMW in one year  Update DTap at pharmacy  Obtain both injections of shingrix at pharmacy 2- 6 months apart    2. Type 2 diabetes mellitus with hyperglycemia, unspecified whether long term insulin use (HCC)  Reviewed home glucose log  Fasting from 126 to 130  PP from 147 to 155  Toujeo is 16 units daily    Chronic diagnosis and management per DR. Hoyos, PCP  FU with her as planned    3.  Essential hypertension  BP is WNl today

## 2021-07-21 PROBLEM — C91.10 CHRONIC LYMPHOCYTIC LEUKEMIA (HCC): Status: ACTIVE | Noted: 2020-01-15

## 2021-07-21 NOTE — PROGRESS NOTES
7/21/21    Linh Ragland  1935      Chief Complaint   Patient presents with    6 Month Follow-Up         Leandro Lim:  1. Mixed hyperlipidemia  Controlled. Continue to monitor  - Lipid Panel; Future  - Comprehensive Metabolic Panel; Future    2. Type 2 diabetes mellitus with hyperglycemia, unspecified whether long term insulin use (HCC)  Controlled. Continue current regimen  - Hemoglobin A1C; Future    3. Essential hypertension  Stable. Discussed lifestyle modification and patient expressed understanding  - Comprehensive Metabolic Panel; Future      HPI  Here for f/u hyperlipidemia, dm and htn. Pt feels well. Lipids improved with rosuvastatin 40mg. Denies muscle aches    Fasting blood sugars at goal. Tolerating insulin. No hypoglycemic episodes. HgA1c 7.1    Controls blood pressure with lifestyle modification. Denies cp/shortness of breath/pnd/orthopnea      Review of Systems   Constitutional: Negative for unexpected weight change. Respiratory: Negative for cough, chest tightness, shortness of breath and wheezing. Cardiovascular: Negative for chest pain, palpitations and leg swelling. Gastrointestinal: Negative for abdominal distention, constipation, diarrhea, nausea and vomiting. Musculoskeletal: Negative for arthralgias, back pain and myalgias. Neurological: Negative for tremors and numbness. All other systems reviewed and are negative.       Current Outpatient Medications   Medication Sig Dispense Refill    rosuvastatin (CRESTOR) 40 MG tablet TAKE ONE TABLET BY MOUTH DAILY 90 tablet 1    TOUJEO SOLOSTAR 300 UNIT/ML SOPN INJECT 10 UNITS UNDER THE SKIN DAILY 5 pen 4    escitalopram (LEXAPRO) 10 MG tablet TAKE ONE TABLET BY MOUTH DAILY 30 tablet 11    B-D UF III MINI PEN NEEDLES 31G X 5 MM MISC USE ONE - DAILY 100 each 4    TRUE METRIX BLOOD GLUCOSE TEST strip USE TO TEST BLOOD SUGAR TWO TIMES A DAY FOR DIABETES 100 strip 5    tamsulosin (FLOMAX) 0.4 MG capsule Take 0.4 mg by mouth daily     

## 2021-10-06 NOTE — ED PROVIDER NOTES
**ADVANCED PRACTICE PROVIDER, I HAVE EVALUATED THIS [facility]  ED  EMERGENCY DEPARTMENT ENCOUNTER      Pt Name: Dominic Silveira  QAL:[id number]  Birthdate 1935  Date of evaluation: 10/6/2021  Provider: DIANNE Ruffin      Chief Complaint:    Chief Complaint   Patient presents with    Shortness of Breath     \"bad chest cold\" since monday. Nursing Notes, Past Medical Hx, Past Surgical Hx, Social Hx, Allergies, and Family Hx were all reviewed and agreed with or any disagreements were addressed in the HPI.    HPI: (Location, Duration, Timing, Severity, Quality, Assoc Sx, Context, Modifying factors)    Chief Complaint of nasal congestion and cough. This is a  81 y.o. male who presents via personal transport complaining of sinus congestion and productive cough. The patient has a past medical history of diabetes, CKD, essential tremor, and lung cancer. The patient is not currently in treatment he states he has an upcoming appointment with his pulmonologist to discuss treatment options. He states beginning on Monday he had worsening sinus congestion with a productive cough however he has since not had a cough since then. He denies shortness of breath. He states his primary complaint is sinus congestion. He has not taken anything for his symptoms. He denies fever, chills, lightheadedness, dizziness, chest pain, abdominal pain, nausea, vomiting, or diarrhea. He does feel his symptoms are improving.      PastMedical/Surgical History:      Diagnosis Date    Back pain     Cancer ([address]     skin    Chronic kidney disease     Diabetes mellitus ([address]     Essential tremor     History of BPH     Hypercholesteremia     Renal insufficiency     Type 2 diabetes mellitus with hyperglycemia ([address] 12/21/2017         Procedure Laterality Date    CATARACT REMOVAL      CYSTOSCOPY      HERNIA REPAIR  1959    KIDNEY STONE REMOVAL Left 6/1/2021    LEFT PERCUTANEOUS Gastrointestinal: Negative for abdominal pain, diarrhea, nausea and vomiting. Genitourinary: Negative for dysuria, frequency and urgency. Musculoskeletal: Negative for back pain. Skin: Negative for rash. Neurological: Negative for dizziness, weakness and headaches. Physical Exam:  Physical Exam  Vitals and nursing note reviewed. Constitutional:       Appearance: Normal appearance. He is not diaphoretic. HENT:      Head: Normocephalic and atraumatic. Nose: Nose normal.      Mouth/Throat:      Mouth: Mucous membranes are moist.      Pharynx: Oropharynx is clear. No pharyngeal swelling or oropharyngeal exudate. Eyes:      General:         Right eye: No discharge. Left eye: No discharge. Extraocular Movements: Extraocular movements intact. Cardiovascular:      Rate and Rhythm: Normal rate and regular rhythm. Pulses: Normal pulses. Heart sounds: Normal heart sounds. No murmur heard. No friction rub. No gallop. Pulmonary:      Effort: Pulmonary effort is normal. No tachypnea or respiratory distress. Breath sounds: Normal breath sounds. No stridor. No decreased breath sounds, wheezing, rhonchi or rales. Abdominal:      Palpations: Abdomen is soft. Tenderness: There is no abdominal tenderness. There is no guarding or rebound. Musculoskeletal:         General: Normal range of motion. Cervical back: Normal range of motion and neck supple. Right lower leg: No tenderness. No edema. Left lower leg: No tenderness. No edema. Skin:     General: Skin is warm and dry. Coloration: Skin is not pale. Neurological:      Mental Status: He is alert and oriented to person, place, and time.    Psychiatric:         Mood and Affect: Mood normal.         Behavior: Behavior normal.         MEDICAL DECISION MAKING    Vitals:    Vitals:    10/06/21 1841 10/06/21 1908 10/06/21 2126   BP: 117/72 126/69 (!) 141/66   Pulse: 108 105 88   Resp: 22 17 21   Temp: 98.5 °F (36.9 °C)     TempSrc: Oral     SpO2: 94% 94% 94%   Weight: 145 lb (65.8 kg)     Height: 5' 6\" (1.676 m)         LABS:  Labs Reviewed   CBC WITH AUTO DIFFERENTIAL - Abnormal; Notable for the following components:       Result Value    WBC 46.9 (*)     RDW 16.2 (*)     Platelets 92 (*)     Neutrophils Absolute 8.0 (*)     Lymphocytes Absolute 37.5 (*)     Monocytes Absolute 1.4 (*)     Atypical Lymphocytes Relative 15 (*)     Anisocytosis 1+ (*)     Macrocytes Occasional (*)     Microcytes Occasional (*)     Polychromasia Occasional (*)     Poikilocytes Occasional (*)     Ovalocytes Occasional (*)     All other components within normal limits    Narrative:     Theronjayde Javed tel. [phone],  Hematology results called to and read back byJp Jackson RN, 10/06/2021  20:18, by [facility]  Hematology results called to and read back byjp jackson rn, 10/06/2021  20:10, by TYSON  Performed at:  [place] [address] [address]   Phone [phone]   BASIC METABOLIC PANEL W/ REFLEX TO MG FOR LOW K - Abnormal; Notable for the following components:    Sodium 135 (*)     Glucose 164 (*)     BUN 29 (*)     CREATININE 2.3 (*)     GFR Non- 27 (*)     GFR  33 (*)     All other components within normal limits    Narrative:     Performed at:  [facility]) [place] [address] [address]   Phone [phone]   TROPONIN    Narrative:     Performed at:  [place] [address] [address]   Phone [phone]   SAMPLE POSSIBLE BLOOD BANK TESTING    Narrative:     Performed at:  [place] [address] [address]   Phone  of labs reviewed and were negative at this time or not returned at the time of this note.     RADIOLOGY:   Non-plain film images such as CT, Ultrasound and MRI are read by the radiologist. DIANNE Cm have directly visualized the radiologic plain film image(s) with the below findings:      Interpretation per the Radiologist below, if available at the time of this note:    CT CHEST WO CONTRAST   Final Result   Chronic obstructive lung changes with bibasilar fibrosis and scarring and   bronchiectasis with subpleural interstitial opacities which is more prominent   on the right and is slightly increased which could be due to progressive   fibrosis or early pneumonitis recommend short-term follow-up      Small pleural-based nodules along the lung bases posteriorly which are   unchanged with no new nodule seen      Mildly dilated and atherosclerotic thoracic aorta with moderate coronary   artery calcifications which is unchanged. Small left renal stone. Cholelithiasis which is unchanged. XR CHEST PORTABLE   Final Result   No acute cardiopulmonary disease. MEDICAL DECISION MAKING / ED COURSE:      Patient was seen and evaluated by myself and attending physician. All diagnostic, treatment, and disposition decisions were made in conjunction with attending physician. Patient was given:  Medications - No data to display    Patient was seen and evaluated for nasal congestion and cough. Vital signs are stable, patient was initially slightly tachycardic at triage which did resolve without intervention. CBC with chronically elevated white blood cells at 46.9, this is again chronic when compared to most recent lab work which is likely due to known history of lung cancer. Platelets are 92. BMP with BUN of 29 and creatinine 2.3 with a GFR of 27, this is stable when compared to most recent lab work. EKG interpreted by attending physician found to have normal sinus rhythm. Chest xray without acute cardiopulmonary disease. Troponin negative. CT chest obtained without contrast due to kidney function.  Imaging results with stable chronic changes, small pleural based nodules along the lung base without evidence of new nodule. Stable dilated and atherosclerotic thoracic aorta with mod calcifications. At this time a discussion was had with the patient regarding all lab and imaging results. At this time patient symptoms are most consistent with sinus congestion, possible viral URI. We discussed symptomatic treatment at home with OTC flonase and mucinex. He will follow up with his PCP in 48 hours for further evaluation and treatment. He is given very strict return precautions and will return to the emergency department if he develops any new or worsening symptoms. The patient tolerated their visit well. I evaluated the patient. The physician was available for consultation as needed. The patient and / or the family were informed of the results of any tests, a time was given to answer questions, a plan was proposed and they agreed with plan.       Results for orders placed or performed during the hospital encounter of 10/06/21   CBC Auto Differential   Result Value Ref Range    WBC 46.9 (HH) 4.0 - 11.0 K/uL    RBC 4.72 4.20 - 5.90 M/uL    Hemoglobin 13.5 13.5 - 17.5 g/dL    Hematocrit 41.3 40.5 - 52.5 %    MCV 87.5 80.0 - 100.0 fL    MCH 28.7 26.0 - 34.0 pg    MCHC 32.8 31.0 - 36.0 g/dL    RDW 16.2 (H) 12.4 - 15.4 %    Platelets 92 (L) 106 - 450 K/uL    MPV 8.1 5.0 - 10.5 fL    PLATELET SLIDE REVIEW Decreased     SLIDE REVIEW see below     Path Consult No     Neutrophils % 16.0 %    Lymphocytes % 65.0 %    Monocytes % 3.0 %    Eosinophils % 0.0 %    Basophils % 0.0 %    Neutrophils Absolute 8.0 (H) 1.7 - 7.7 K/uL    Lymphocytes Absolute 37.5 (H) 1.0 - 5.1 K/uL    Monocytes Absolute 1.4 (H) 0.0 - 1.3 K/uL    Eosinophils Absolute 0.0 0.0 - 0.6 K/uL    Basophils Absolute 0.0 0.0 - 0.2 K/uL    Bands Relative 1 0 - 7 %    Atypical Lymphocytes Relative 15 (H) 0 - 6 %    Anisocytosis 1+ (A)     Macrocytes Occasional (A)     Microcytes Occasional (A)     Polychromasia Occasional (A)     Poikilocytes Occasional (A)     Ovalocytes Occasional (A)    Basic Metabolic Panel w/ Reflex to MG   Result Value Ref Range    Sodium 135 (L) 136 - 145 mmol/L    Potassium reflex Magnesium 4.3 3.5 - 5.1 mmol/L    Chloride 99 99 - 110 mmol/L    CO2 24 21 - 32 mmol/L    Anion Gap 12 3 - 16    Glucose 164 (H) 70 - 99 mg/dL    BUN 29 (H) 7 - 20 mg/dL    CREATININE 2.3 (H) 0.8 - 1.3 mg/dL    GFR Non-African American 27 (A) >60    GFR  33 (A) >60    Calcium 10.3 8.3 - 10.6 mg/dL   Troponin   Result Value Ref Range    Troponin <0.01 <0.01 ng/mL   Sample possible blood bank testing   Result Value Ref Range    Specimen Status ANDRE    EKG 12 Lead   Result Value Ref Range    Ventricular Rate 91 BPM    Atrial Rate 91 BPM    P-R Interval 198 ms    QRS Duration 170 ms    Q-T Interval 422 ms    QTc Calculation (Bazett) 519 ms    P Axis 22 degrees    R Axis -69 degrees    T Axis -1 degrees    Diagnosis       Normal sinus rhythmRight bundle branch blockLeft anterior fascicular block Bifascicular block Abnormal ECGWhen compared with ECG of 01-OCT-2019 09:10,Borderline criteria for Lateral infarct are no longer PresentConfirmed by Isiah Yin ([id number]) on 10/7/2021 12:56:34 PM         I estimate there is LOW risk for EPIGLOTTITIS, PNEUMONIA, MENINGITIS, OR URINARY TRACT INFECTION, thus I consider the discharge disposition reasonable. Also, there is no evidence or peritonitis, sepsis, or toxicity. Jaye Jones and I have discussed the diagnosis and risks, and we agree with discharging home to follow-up with their primary doctor. We also discussed returning to the Emergency Department immediately if new or worsening symptoms occur. We have discussed the symptoms which are most concerning (e.g., changing or worsening pain, trouble swallowing or breating, neck stiffness, fever) that necessitate immediate return. Final Impression    1. Sinus congestion    2.  Cough Discharge Vital Signs:  Blood pressure (!) 140/65, pulse 80, temperature 98.5 °F (36.9 °C), temperature source Oral, resp. rate 21, height 5' 6\" (1.676 m), weight 145 lb (65.8 kg), SpO2 94 %. CLINICAL IMPRESSION:  1. Sinus congestion    2.  Cough        DISPOSITION Decision To Discharge 10/07/2021 12:31:03 AM      PATIENT REFERRED TO:  [facility]  ED  [address] [facility] [address]  Go to   If symptoms worsen    Neva Rothman MD  [facility] [place] [address]  [phone]    Schedule an appointment as soon as possible for a visit         DISCHARGE MEDICATIONS:  New Prescriptions    No medications on file       DISCONTINUED MEDICATIONS:  Discontinued Medications    No medications on file              (Please note the MDM and HPI sections of this note were completed with a voice recognition program.  Efforts were made to edit the dictations but occasionally words are mis-transcribed.)    Electronically signed, Neftaly Ron,           Neftaly Ron  10/09/21 1123

## 2021-10-07 NOTE — ED PROVIDER NOTES
I personally evaluated and examined the patient in conjunction with the APC and agree with the assessment, treatment plan and disposition of the patient has recorded by the APC. I reviewed pertinent nurse's notes, triage notes, vital signs, past medical history, family and social history, medications, and allergies. Complete review of systems was conducted by the mid-level provider and/or myself. Review of systems is negative except as documented in the history of present illness. EKG:     FINAL IMPRESSION     1. Sinus congestion    2. Cough            Electronically signed by: BHARGAVI Hodges MD  10/07/21 8001

## 2021-10-10 PROBLEM — U07.1 COVID-19: Status: ACTIVE | Noted: 2021-01-01

## 2021-10-10 NOTE — ED NOTES
Patients son Mae Espinosa called and updated on patients current condition and positive covid result.  Family verbalized understanding of no visitors due to positive covid test.      Jeovany King RN  10/10/21 1124

## 2021-10-10 NOTE — H&P
Hospital Medicine History & Physical      PCP: Kacy Rangel MD    Date of Admission: 10/10/2021    Date of Service: Pt seen/examined on 10/10/21 and Admitted to Inpatient with expected LOS greater than two midnights due to medical therapy. Chief Complaint:  falls    History Of Present Illness:      81 y.o. male who presented to [facility] with falls and SOB. Notes he lives alone in an assisted living apartment. States he was recently diagnosed with lung cancer. Is in the process of that work-up. Was diagnosed with COVID. He was vaccinated with both doses. He was home and became weak. He fell multiple times. Came to the ED and found to be hypoxic. Started on oxygen. He does not wear oxygen at baseline. He is feeling better. Denies fever, chills, N/V, pain. Past Medical History:          Diagnosis Date    Back pain     Cancer ([address]     skin    Chronic kidney disease     Diabetes mellitus ([address]     Essential tremor     History of BPH     Hypercholesteremia     Renal insufficiency     Type 2 diabetes mellitus with hyperglycemia ([address] 12/21/2017       Past Surgical History:          Procedure Laterality Date    CATARACT REMOVAL      CYSTOSCOPY      HERNIA REPAIR  1959    KIDNEY STONE REMOVAL Left 6/1/2021    LEFT PERCUTANEOUS NEPHROLITHOTOMY performed by Lianna Martinez MD at [facility]  8/24/2012    KYPHOPLASTY T10 AND T12    NEPHROSTOMY Left 6/1/2021    cystoscopy, ramirez catheter placement performed by Lianna Martinez MD at [facility] [address] NEPHROSTOMY Left 6/1/2021    NEPHROSTOMY PLACEMENT performed by Lester Duque MD at [place] [address] Left 06/01/2021    cystoscopy, ramirez catheter placement (Left Bladder) nephrostomy placement left pelvis left percutaneous nephrolithotomy       Medications Prior to Admission:      Prior to Admission medications    Medication Sig Start Date End Date Taking?  Authorizing Provider   rosuvastatin (CRESTOR) 40 MG tablet TAKE ONE TABLET BY MOUTH DAILY 4/20/21   Anna Villaseñor, APRN - CNP   TOUJEO SOLOSTAR 300 UNIT/ML SOPN INJECT 10 UNITS UNDER THE SKIN DAILY 3/31/21   Margot Bansal MD   escitalopram (LEXAPRO) 10 MG tablet TAKE ONE TABLET BY MOUTH DAILY 1/8/21   Margot Bansal MD   B-D UF III MINI PEN NEEDLES 31G X 5 MM MISC USE ONE - DAILY 11/3/20   Margot Bansal MD   TRUE METRIX BLOOD GLUCOSE TEST strip USE TO TEST BLOOD SUGAR TWO TIMES A DAY FOR DIABETES 11/18/19   Margot Bansal MD   tamsulosin (FLOMAX) 0.4 MG capsule Take 0.4 mg by mouth daily    Historical Provider, MD   acetaminophen (TYLENOL) 500 MG tablet Take 500 mg by mouth every 6 hours as needed for Pain    Historical Provider, MD   Cholecalciferol (VITAMIN D3) 2000 units CAPS Take by mouth    Historical Provider, MD   Blood Glucose Monitoring Suppl (TRUE METRIX AIR GLUCOSE METER) Lucero Settler E11.65 9/22/16   Margot Bansal MD   Blood Glucose Monitoring Suppl (TRUE METRIX AIR GLUCOSE METER) W/DEVICE KIT Dispense Blood Glucose monitoring kit for diabetes ICD-10-CM: E11.65 9/13/16   Margot Bansal MD   Lancets MISC Test one time daily 10/1/13   Margot Bansal MD   aspirin 81 MG chewable tablet Take 81 mg by mouth daily. Historical Provider, MD       Allergies:  Patient has no known allergies. Social History:      The patient currently lives alone in an assisted living apartment. TOBACCO:   reports that he quit smoking about 43 years ago. His smoking use included cigarettes. He has a 10.00 pack-year smoking history. He has never used smokeless tobacco.  ETOH:   reports current alcohol use.   E-Cigarettes/Vaping Use     Questions Responses    E-Cigarette/Vaping Use Never User    Start Date     Passive Exposure     Quit Date     Counseling Given     Comments             Family History:      Positive as follows:        Problem Relation Age of Onset    Heart Disease Father     Heart Disease Sister        REVIEW OF SYSTEMS COMPLETED:   Pertinent positives as noted in the HPI. All other systems reviewed and negative. PHYSICAL EXAM PERFORMED:    /71   Pulse 90   Temp 100.7 °F (38.2 °C) (Oral)   Resp (!) 31   Ht 5' 8\" (1.727 m)   Wt 140 lb (63.5 kg)   SpO2 94%   BMI 21.29 kg/m²     General appearance:  No apparent distress, appears stated age and cooperative. HEENT:  Normal cephalic, atraumatic without obvious deformity. Pupils equal, round, and reactive to light. Extra ocular muscles intact. Conjunctivae/corneas clear. Neck: Supple, with full range of motion. No jugular venous distention. Trachea midline. Respiratory:  Normal respiratory effort. Clear to auscultation, bilaterally without Rales/Wheezes/Rhonchi. Cardiovascular:  Regular rate and rhythm with normal S1/S2 without murmurs, rubs or gallops. Abdomen: Soft, non-tender, non-distended with normal bowel sounds. Musculoskeletal:  Generalized weakness. Skin: Skin color, texture, turgor normal.  No rashes or lesions. Neurologic:  Neurovascularly intact without any focal sensory/motor deficits. Cranial nerves: II-XII intact, grossly non-focal.  Psychiatric:  Alert and oriented, thought content appropriate, normal insight  Capillary Refill: Brisk,3 seconds, normal  Peripheral Pulses: +2 palpable, equal bilaterally       Labs:     Recent Labs     10/10/21  1009   WBC 28.2*   HGB 13.0*   HCT 39.8*   PLT 93*     Recent Labs     10/10/21  1009   *   K 4.1   CL 97*   CO2 22   BUN 43*   CREATININE 2.7*   CALCIUM 9.1     Recent Labs     10/10/21  1009   AST 99*   ALT 44*   BILITOT 1.1*   ALKPHOS 51     No results for input(s): INR in the last 72 hours.   Recent Labs     10/10/21  1009   TROPONINI 0.01       Urinalysis:      Lab Results   Component Value Date    NITRU Negative 10/10/2021    WBCUA 0-2 10/10/2021    BACTERIA 2+ 01/02/2020    RBCUA None seen 10/10/2021    BLOODU LARGE 10/10/2021    SPECGRAV 1.025 10/10/2021    GLUCOSEU Negative 10/10/2021       Radiology:     CXR: I have reviewed the CXR with the following interpretation: fibrotic changes  EKG:  I have reviewed the EKG with the following interpretation: sinus, RBBB, PVCs    XR THORACIC SPINE (MIN 4 VIEWS)   Final Result   Multiple old compression fractures status post vertebral body enhancement T10   and T12      No acute fracture definitely identified         XR CHEST PORTABLE   Final Result   No acute cardiopulmonary disease      Stable interstitial opacities, likely fibrotic. ASSESSMENT:    Active Hospital Problems    Diagnosis Date Noted    COVID-19 [U07.1] 10/10/2021         PLAN:    Date of symptom onset - September 27   Date of diagnosis - 10/4/21  Date of admission - 10/10/21  Vaccinated - both doses approx 6 months ago  CXR - fibrotic changes  Troponin and EKG -<0.01, sinus, RBBB, PVCs  Supplemental oxygen : -  currently on 1-2LPM to maintain sats >94%, wean as tolerated  Labs : ferritin, daily D-dimer, CRP for prognostication  Steroids : IV Solu-Medrol 40 mg twice daily   Antiviral : IV remdesivir - unable to use as with CKD  IV antibiotics : hold, low suspicion of concomitant bacterial pneumonia  Anticoagulation : heparin  IV fluids :  Avoid overzealous fluids  Intubation/MV : discussed with patient, patient agreeable if needed  Consider Tocilizumab if CRP >75   Continue excellent supportive care, QD family updates, droplet plus isolation    Lung cancer - recent diagnosis. Started work-up as outpt. Lymphocytosis - chronically elevated WBCs. Monitor CBC. HLD - continue statin - monitor LFTs. Stable. DM2- SSI and lantus. Monitor BS. CKD - stage 4 - BL 2-2.5 BL. Currently 2.7. Monitor BMP. Renal Osteodystrophy - continue Vit D - 2000IU daily. DVT Prophylaxis: heparin  Diet: No diet orders on file  Code Status: Prior    PT/OT Eval Status: ordered    Dispo - 2-4 days       Parmjit Stapleton MD    Thank you Kristian Alan MD for the opportunity to be involved in this patient's care.  If you

## 2021-10-10 NOTE — PROGRESS NOTES
4 Eyes Skin Assessment     The patient is being assess for   Admission    I agree that 2 RN's have performed a thorough Head to Toe Skin Assessment on the patient. ALL assessment sites listed below have been assessed. Areas assessed for pressure by both nurses:   [x]   Head, Face, and Ears   [x]   Shoulders, Back, and Chest, Abdomen  [x]   Arms, Elbows, and Hands   [x]   Coccyx, Sacrum, and Ischium  [x]   Legs, Feet, and Heels        Skin Assessed Under all Medical Devices by both nurses:  O2 device tubing              All Mepilex Borders were peeled back and area peeked at by both nurses:  Yes  Please list where Mepilex Borders are located:  n/a             **SHARE this note so that the co-signing nurse is able to place an eSignature**    Co-signer eSignature: Electronically signed by Sergio Mazariegos RN on 10/10/21 at 6:59 PM EDT    Does the Patient have Skin Breakdown related to pressure?   No          Mor Prevention initiated:  Yes  Wound Care Orders initiated:  No      [employer] nurse consulted for Pressure Injury (Stage 3,4, Unstageable, DTI, NWPT, Complex wounds)and New or Established Ostomies:  No      Primary Nurse eSignature: Electronically signed by Hugo Matson RN on 29/70/08 at 6:54 PM EDT

## 2021-10-10 NOTE — ED PROVIDER NOTES
ICD-10-CM: E11.65 16   Boris Baxter MD   Lancets MISC Test one time daily 10/1/13   Boris Baxter MD   aspirin 81 MG chewable tablet Take 81 mg by mouth daily. Historical Provider, MD       Allergies as of 10/10/2021    (No Known Allergies)       Past Medical History:   Diagnosis Date    Back pain     Cancer ([facility] [address]     skin    Chronic kidney disease     Diabetes mellitus ([facility] [address]     Essential tremor     History of BPH     Hypercholesteremia     Renal insufficiency     Type 2 diabetes mellitus with hyperglycemia ([facility] [address] 2017        Surgical History:   Past Surgical History:   Procedure Laterality Date    CATARACT REMOVAL      CYSTOSCOPY      HERNIA REPAIR      KIDNEY STONE REMOVAL Left 2021    LEFT PERCUTANEOUS NEPHROLITHOTOMY performed by Leslie Milan MD at [address]  2012    KYPHOPLASTY T10 AND T12    NEPHROSTOMY Left 2021    cystoscopy, ramirez catheter placement performed by Leslie Milan MD at [facility] [address] NEPHROSTOMY Left 2021    [facility]OST[facility] [address] [facility]    cystoscopy, ramirez catheter placement (Left Bladder) nephrostomy placement left pelvis left percutaneous nephrolithotomy        Family History:    Family History   Problem Relation Age of Onset    Heart Disease Father     Heart Disease Sister        Social History     Socioeconomic History    Marital status:       Spouse name: Not on file    Number of children: Not on file    Years of education: Not on file    Highest education level: Not on file   Occupational History    Not on file   Tobacco Use    Smoking status: Former Smoker     Packs/day: 1.00     Years: 10.00     Pack years: 10.00     Types: Cigarettes     Quit date: 3/19/1978     Years since quittin.5    Smokeless tobacco: Never Used    Tobacco comment: quit 25 years ago   Vaping Use    Vaping Use: Never used   Substance and Sexual Activity    Alcohol use: Yes     Comment: on occasion    Drug use: No    Sexual activity: Not on file   Other Topics Concern    Not on file   Social History Narrative    Not on file     Social Determinants of Health     Financial Resource Strain: Low Risk     Difficulty of Paying Living Expenses: Not hard at all   Food Insecurity: No Food Insecurity    Worried About [address] in the Last Year: Never true    [address] in the Last Year: Never true   Transportation Needs:     Lack of Transportation (Medical):  Lack of Transportation (Non-Medical):    Physical Activity:     Days of Exercise per Week:     Minutes of Exercise per Session:    Stress:     Feeling of Stress :    Social Connections:     Frequency of Communication with Friends and Family:     Frequency of Social Gatherings with Friends and Family:     Attends Jew Services:     Active Member of Clubs or Organizations:     Attends Club or Organization Meetings:     Marital Status:    Intimate Partner Violence:     Fear of Current or Ex-Partner:     Emotionally Abused:     Physically Abused:     Sexually Abused:          Review of Systems   Constitutional: Positive for fever. Negative for activity change, appetite change, chills, diaphoresis and fatigue. HENT: Negative. Negative for congestion, dental problem, ear pain, facial swelling, rhinorrhea, sinus pressure, sneezing, sore throat, tinnitus, trouble swallowing and voice change. Eyes: Negative for photophobia, pain, discharge, redness, itching and visual disturbance. Respiratory: Positive for cough and shortness of breath. Negative for chest tightness, wheezing and stridor. Cardiovascular: Negative for chest pain, palpitations and leg swelling. Gastrointestinal: Negative for abdominal distention, abdominal pain, anal bleeding, blood in stool, constipation, diarrhea, nausea and vomiting.    Genitourinary: Negative for difficulty urinating, discharge, dysuria, frequency, hematuria, testicular pain and urgency. Musculoskeletal: Negative for back pain, joint swelling, neck pain and neck stiffness. Skin: Negative for rash and wound. Neurological: Positive for facial asymmetry and weakness. Negative for dizziness, syncope, speech difficulty, numbness and headaches. Hematological: Does not bruise/bleed easily. Psychiatric/Behavioral: Negative for agitation, confusion, hallucinations, self-injury, sleep disturbance and suicidal ideas. The patient is not nervous/anxious. All other systems reviewed and are negative. Physical Exam  Vitals and nursing note reviewed. Constitutional:       General: He is not in acute distress. Appearance: He is well-developed. HENT:      Head: Normocephalic and atraumatic. Right Ear: External ear normal.      Left Ear: External ear normal.      Nose: Nose normal.      Mouth/Throat:      Pharynx: No oropharyngeal exudate. Eyes:      General: No scleral icterus. Right eye: No discharge. Left eye: No discharge. Conjunctiva/sclera: Conjunctivae normal.      Pupils: Pupils are equal, round, and reactive to light. Neck:      Vascular: No JVD. Trachea: No tracheal deviation. Cardiovascular:      Rate and Rhythm: Normal rate and regular rhythm. Heart sounds: Normal heart sounds. No murmur heard. No friction rub. No gallop. Pulmonary:      Effort: Pulmonary effort is normal. No respiratory distress. Breath sounds: Examination of the right-lower field reveals rhonchi. Examination of the left-lower field reveals rhonchi. Rhonchi present. No wheezing or rales. Abdominal:      General: Bowel sounds are normal. There is no distension. Palpations: Abdomen is soft. There is no mass. Tenderness: There is no abdominal tenderness. There is no guarding or rebound. Musculoskeletal:         General: No tenderness. Normal range of motion.       Cervical back: Normal range of motion and neck supple. Lymphadenopathy:      Cervical: No cervical adenopathy. Skin:     General: Skin is warm and dry. Capillary Refill: Capillary refill takes less than 2 seconds. Coloration: Skin is not pale. Findings: Ecchymosis (bruising both forearms; no deformity) present. No erythema or rash. Neurological:      General: No focal deficit present. Mental Status: He is alert and oriented to person, place, and time. Cranial Nerves: No cranial nerve deficit. Motor: No abnormal muscle tone. Coordination: Coordination normal.      Deep Tendon Reflexes: Reflexes are normal and symmetric. Reflexes normal.   Psychiatric:         Behavior: Behavior normal.         Thought Content:  Thought content normal.         Judgment: Judgment normal.          Procedures     MDM   Results for orders placed or performed during the hospital encounter of 10/10/21   COVID-19, Rapid    Specimen: Nasopharyngeal Swab   Result Value Ref Range    SARS-CoV-2, NAAT DETECTED (AA) Not Detected   CBC auto differential   Result Value Ref Range    WBC 28.2 (H) 4.0 - 11.0 K/uL    RBC 4.51 4.20 - 5.90 M/uL    Hemoglobin 13.0 (L) 13.5 - 17.5 g/dL    Hematocrit 39.8 (L) 40.5 - 52.5 %    MCV 88.2 80.0 - 100.0 fL    MCH 28.7 26.0 - 34.0 pg    MCHC 32.6 31.0 - 36.0 g/dL    RDW 16.3 (H) 12.4 - 15.4 %    Platelets 93 (L) 226 - 450 K/uL    MPV 8.2 5.0 - 10.5 fL   Comprehensive metabolic panel   Result Value Ref Range    Sodium 133 (L) 136 - 145 mmol/L    Potassium 4.1 3.5 - 5.1 mmol/L    Chloride 97 (L) 99 - 110 mmol/L    CO2 22 21 - 32 mmol/L    Anion Gap 14 3 - 16    Glucose 228 (H) 70 - 99 mg/dL    BUN 43 (H) 7 - 20 mg/dL    CREATININE 2.7 (H) 0.8 - 1.3 mg/dL    GFR Non-African American 23 (A) >60    GFR  27 (A) >60    Calcium 9.1 8.3 - 10.6 mg/dL    Total Protein 6.5 6.4 - 8.2 g/dL    Albumin 3.8 3.4 - 5.0 g/dL    Albumin/Globulin Ratio 1.4 1.1 - 2.2    Total Bilirubin 1.1 (H) 0.0 - 1.0 mg/dL    Alkaline Phosphatase 51 40 - 129 U/L    ALT 44 (H) 10 - 40 U/L    AST 99 (H) 15 - 37 U/L    Globulin 2.7 Not Established g/dL   Troponin   Result Value Ref Range    Troponin 0.01 <0.01 ng/mL   Brain Natriuretic Peptide   Result Value Ref Range    Pro- (H) 0 - 449 pg/mL   EKG 12 Lead   Result Value Ref Range    Ventricular Rate 96 BPM    Atrial Rate 96 BPM    P-R Interval 174 ms    QRS Duration 162 ms    Q-T Interval 402 ms    QTc Calculation (Bazett) 507 ms    P Axis 29 degrees    R Axis -62 degrees    T Axis 4 degrees    Diagnosis       Sinus rhythm with Premature supraventricular complexesRight bundle branch blockLeft anterior fascicular block** Bifascicular block **Abnormal ECGWhen compared with ECG of 06-OCT-2021 19:43,Premature supraventricular complexes are now Present       I spoke with Dr. Kiran Hudson, hospitalist We thoroughly discussed the history, physical exam, laboratory and imaging studies, as well as, emergency department course. Based upon that discussion, we've decided to admit Adeola Watson for further observation and evaluation of Felt Jose Rafael Lee's dyspnea. As I have deemed necessary from their history, physical, and studies, I have considered and evaluated Adeola Watson for the following diagnoses:  ACUTE CORONARY SYNDROME, CHRONIC OBSTRUCTIVE PULMONARY DISEASE, CONGESTIVE HEART FAILURE, PERICARDIAL TAMPONADE, PNEUMONIA, PNEUMOTHORAX, PULMONARY EMBOLISM, SEPSIS, and THORACIC DISSECTION. FINAL IMPRESSION  1. Generalized weakness    2. COVID-19    3. LEONOR (acute kidney injury) ([facility] [address]    4. Hyponatremia    5. Leukocytosis, unspecified type    6. Elevated LFTs    7. Thrombocytopenia ([address]    8. Hypoxia    9. Elevated d-dimer        Vitals:  Blood pressure 138/71, pulse 90, temperature 100.7 °F (38.2 °C), temperature source Oral, resp. rate (!) 31, height 5' 8\" (1.727 m), weight 140 lb (63.5 kg), SpO2 94 %.      Radiology  XR CHEST PORTABLE    Result Date: 10/10/2021  No acute cardiopulmonary disease Stable interstitial opacities, likely fibrotic. XR THORACIC SPINE (MIN 4 VIEWS)    Result Date: 10/10/2021  Multiple old compression fractures status post vertebral body enhancement T10 and T12 No acute fracture definitely identified       EKG Interpretation. The Ekg interpreted by me in the absence of a cardiologist shows. normal sinus rhythm with a rate of 96 with RBBB and LAHB present  Axis is   Normal  QTc is  507 ms  Intervals and Durations are unremarkable. No specific ST-T wave changes appreciated. No evidence of acute ischemia. No old EKG available for comparison.           Ovidio Worley MD  10/10/21 7070

## 2021-10-11 NOTE — PROGRESS NOTES
Physical Therapy    Facility/Department: [facility] - MED SURG/ORTHO  Initial Assessment    NAME: Marisa Boyce  : 1935  MRN: [id number]    Date of Service: 10/11/2021    Discharge Recommendations:  Continue to assess pending progress (possibly home with 24 hour supervision or assist, Home with Home health PT)  PT Equipment Recommendations  Equipment Needed: No (CTA, may need RW)    Assessment   Body structures, Functions, Activity limitations: Decreased functional mobility ; Decreased balance;Decreased strength;Decreased endurance  Assessment: Pt presents to [facility] with COVID pneumonia requiring increasd oxygen. PTA, pt performs functional mobilty independently and has PMH of Parkinson's disease. Pt reports experiencing multiple falls prior to admission. Currently, pt requires CGA-min(A) for bed mobility and simple transfers and demo decreased Spo2 on 5 L O2 requiring extended rest breaks to recover with cues for PLB. Futher transfer and ambulation assessment limited due to decreased SpO2 and recovery time. Pt would benefit from continued skilled PT to address deficits listed above and maximize strength and endurance and reduce risk for falls. Recommend Home with 24/7 sup/assist and HHPT upon d/c  Treatment Diagnosis: impaired endurance  Prognosis: Good  Decision Making: Medium Complexity  PT Education: Goals; General Safety;PT Role;Disease Specific Education;Plan of Care; Functional Mobility Training;Transfer Training  Patient Education: pt educated on PLB, importance of oxygen and meaning of Spo2 reading; importance of continued mobility.  Pt verbalizes understanding  REQUIRES PT FOLLOW UP: Yes  Activity Tolerance  Activity Tolerance: Vitals at rest: /62, HR 81 bpm, Spo2 90% on 3 L O2; Vitals with activity: /56, HR 90 bpm, SpO2 80% on 3 L, increased O2 to 5 L per RN and pt improved to 90% on 5L with 2 minute rest and PLB       Patient Diagnosis(es): The primary encounter diagnosis was Generalized weakness. Diagnoses of COVID-19, LEONOR (acute kidney injury) ([facility] [address] Hyponatremia, Leukocytosis, unspecified type, Elevated LFTs, Thrombocytopenia ([facility] [address] Hypoxia, and Elevated d-dimer were also pertinent to this visit. has a past medical history of Back pain, Cancer ([facility] [address] Chronic kidney disease, COVID-19, Diabetes mellitus ([facility] [address] Essential tremor, History of BPH, Hypercholesteremia, Renal insufficiency, and Type 2 diabetes mellitus with hyperglycemia ([facility] [address] has a past surgical history that includes Cataract removal; Cystocopy; Kyphosis surgery (8/24/2012); other surgical history (Left, 06/01/2021); Kidney stone removal (Left, 6/1/2021); Nephrostomy (Left, 6/1/2021); Nephrostomy (Left, 6/1/2021); and hernia repair ([id number]).     Restrictions  Restrictions/Precautions  Restrictions/Precautions: General Precautions, Isolation, Fall Risk  Position Activity Restriction  Other position/activity restrictions: 3 L O 2, telemetry, Up with Assist        Subjective  General  Chart Reviewed: Yes  Patient assessed for rehabilitation services?: Yes  Additional Pertinent Hx: PMH: Parkinson's Disease  Response To Previous Treatment: Not applicable  Family / Caregiver Present: No  Referring Practitioner: Elke Caban MD  Referral Date : 10/11/21  Diagnosis: Hyponatremia  Follows Commands: Within Functional Limits  General Comment  Comments: RN cleared pt for therapy eval  Subjective  Subjective: Pt supine in bed upon arrival, agreeable to PT eval  Pain Screening  Patient Currently in Pain: Denies  Vital Signs  Patient Currently in Pain: Denies     Orientation  Orientation  Overall Orientation Status: Within Normal Limits     Social/Functional History  Social/Functional History  Lives With: Alone  Type of Home: Apartment  Home Layout: One level  Home Access: Stairs to enter with rails  Entrance Stairs - Number of Steps: 13  Entrance Stairs - Rails: Left  Bathroom Shower/Tub: Walk-in shower, Shower chair without [facility] Toilet: Standard  Bathroom Equipment: Grab bars in shower, Grab bars around toilet  Home Equipment: [employer]  ADL Assistance: Independent  Homemaking Responsibilities: Yes  Meal Prep Responsibility: Primary  Laundry Responsibility: Primary  Cleaning Responsibility: Primary  Shopping Responsibility: Primary  Ambulation Assistance: Independent  Transfer Assistance: Independent  Active : Yes  Mode of Transportation: Car  Occupation: Retired  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 Extended rest break in order to improve to 90+%     Transfers  Sit to Stand: Contact guard assistance  Stand to sit: Contact guard assistance  Comment: Minimal standing tolerance due to decreased SpO2. Ambulation  Ambulation?: No (defered ambulation assessment due to decreased Spo2 with bed mobility and simple transfers)           Plan   Plan  Times per week: 3-5x/week  Current Treatment Recommendations: Strengthening, Neuromuscular Re-education, Home Exercise Program, Safety Education & Training, Patient/Caregiver Education & Training, Endurance Training, Balance Training, Functional Mobility Training, Transfer Training, Gait Training, Stair training  Safety Devices  Type of devices:  All fall risk precautions in place, Bed alarm in place, Nurse notified, Call light within reach, Gait belt, Patient at risk for falls, Left in bed    AM-PAC Score  AM-PAC Inpatient Mobility Raw Score : 17 (10/11/21 1717)  AM-PAC Inpatient T-Scale Score : 42.13 (10/11/21 1717)  Mobility Inpatient CMS 0-100% Score: 50.57 (10/11/21 1717)  Mobility Inpatient CMS G-Code Modifier : CK (10/11/21 1717)        Goals  Short term goals  Time Frame for Short term goals: 7 days ( 10/18/2021)  Short term goal 1: Pt will perform bed mobility independently  Short term goal 2: Pt will perform SPT and STS with cane and supervision  Short term goal 3: Pt will ambulate 50 ft with supervision and LRAD  Short term goal 4: Pt will perform 12-15 reps of BLE exercises to demo understanding of HEp  Patient Goals   Patient goals : \"to be independent\"       Therapy Time   Individual Concurrent Group Co-treatment   Time In 1425         Time Out 1450         Minutes 25         Timed Code Treatment Minutes: 15 Minutes (10 min eval)     If pt is unable to be seen after this session, please let this note serve as discharge summary. Please see case management note for discharge disposition. Thank you.     Eliana Acevedo, PT

## 2021-10-11 NOTE — PROGRESS NOTES
Occupational Therapy   Occupational Therapy Initial Assessment  Date: 10/11/2021   Patient Name: Greg Machado  MRN: [id number]     : 1935    Date of Service: 10/11/2021    Discharge Recommendations:  Continue to assess pending progress (pending safe mobility/endurance)       Assessment   Performance deficits / Impairments: Decreased functional mobility ; Decreased safe awareness;Decreased balance;Decreased coordination;Decreased ADL status; Decreased endurance;Decreased high-level IADLs  Assessment: pt normally independent with high level IADL's & functional mobility without AD & without supplemental O 2, but frequent falls; now requiring 4-5 L O 2, with decreased O 2 sats & requiring min assist with bathroom mobility; pt to benefit from skilled OT services  OT Education: OT Role;Plan of Care;Precautions  Patient Education: disease specific: importance of use RED/nurse call light for Assist with ADL needs & transfers, pt able to return/demonstrate  Barriers to Learning: none perceived at this time  REQUIRES OT FOLLOW UP: Yes  Activity Tolerance  Activity Tolerance: Patient limited by fatigue  Activity Tolerance: high pro's on 4 L O 2: BP = 119/70, HR = 84 %, with PLB, rebounds to 88 % ; sitting EOB:BP = 105/58, HR = 91, 82 % O 2 sats, unable to rebound past 85 % at rest & PLB . at 5 L O 2, pt rebounded to 89 %O 2 sats; RN notified; Safety Devices  Safety Devices in place: Yes  Type of devices: Bed alarm in place;Call light within reach; Left in bed;Nurse notified           Patient Diagnosis(es): The primary encounter diagnosis was Generalized weakness. Diagnoses of COVID-19, LEONOR (acute kidney injury) ([address] Hyponatremia, Leukocytosis, unspecified type, Elevated LFTs, Thrombocytopenia ([address] Hypoxia, and Elevated d-dimer were also pertinent to this visit.      has a past medical history of Back pain, Cancer ([address] Chronic kidney disease, COVID-19, Diabetes mellitus ([address] Essential tremor, History of BPH, Impaired  Vision Exceptions: Wears glasses at all times  Hearing: Exceptions to [facility]  Hearing Exceptions: Bilateral hearing aid          Balance  Sitting Balance: Supervision (EOB 5 minutes)  Standing Balance: Minimal assistance (with gait belt per nursing)  Standing Balance  Activity: bathroom mobility(observed with nursing while donning PPE)  Toilet Transfers  Toilet - Technique: Ambulating (min assist with gait belt on 4 L O 2 per nursing)  Equipment Used: Grab bars  Toilet Transfer: Minimal assistance (on/off low toilet)  ADL  Toileting: Minimal assistance (per nursing)    RUE Tone: Normotonic  LUE Tone: Normotonic  Coordination  Movements Are Fluid And Coordinated: No  Coordination and Movement description: Resting tremors;Right UE;Left UE     Bed mobility  Supine to Sit: Minimal assistance  Sit to Supine: Contact guard assistance  Scooting: Modified independent  Comment: decreased O 2 sats with minimal exertion  Transfers  Sit to stand: Minimal assistance  Stand to sit: Contact guard assistance  Transfer Comments: cue for safe hand placement     Vision - Basic Assessment  Prior Vision: Wears glasses all the time  Cognition  Overall Cognitive Status: Exceptions (pleasant, cooperative)  Arousal/Alertness: Appropriate responses to stimuli  Following Commands:  Follows one step commands with repetition (d/t [place])  Attention Span: Appears intact  Memory: Decreased recall of precautions  Insights: Decreased awareness of deficits  Initiation: Does not require cues  Sequencing: Does not require cues    Type of ROM/Therapeutic Exercise: AROM  Comment: sitting EOB   Hand flex/ext: x  5  Reps  Wrist flex/ext:  X5  Reps  Elbow flex/ext:  x  5  Reps  Forearm sup/pron:  x  5  Reps      LUE AROM : WFL  RUE AROM : WFL        Plan   Plan  Times per week: 2-3x/ week  Current Treatment Recommendations: Endurance Training, ROM, Balance Training, Functional Mobility Training, Safety Education & Training, Positioning, Self-Care / ADL      AM-PAC Score  Self care score = 15  Goals  Short term goals  Time Frame for Short term goals: 2 weeks(10-25-21)  Short term goal 1: supervision with bathroom mobility with LRAD (Least Restrictive Assistive Device) by 10-25-21  Short term goal 2: supervision with standing ADL's for 5 minutes by 10-25-21  Short term goal 3:  Independent with LE self care by 10-20-21  Short term goal 4: tolerate 15 reps BUE AROM exercises  Short term goal 5: supervision with functional/toilet transfers  Patient Goals   Patient goals : get stronger to go home       Therapy Time   Individual Concurrent Group Co-treatment   Time In 1505         Time Out 1540         Minutes [address] [place] [address]

## 2021-10-11 NOTE — PROGRESS NOTES
Perfect Serve MD Shane. [address] [facility] or Facility: [facility] From: Foster Repine RE: Rosario Link 1935 RM: [id number]     PLT 94. Do you still want the heparin given? Need Callback: NO CALLBACK REQ C5 MED SURG / ORTHO    Response:  Yes

## 2021-10-11 NOTE — PLAN OF CARE
Problem: Falls - Risk of:  Goal: Absence of physical injury  Description: Absence of physical injury  Outcome: Ongoing     Problem: Falls - Risk of:  Goal: Will remain free from falls  Description: Will remain free from falls  Note: 1 assist with a cane, bed alarm,  socks, call light within reach, bed in the lowest position

## 2021-10-11 NOTE — PROGRESS NOTES
Hospitalist Progress Note      PCP: Rick Calvillo MD    Date of Admission: 10/10/2021    Chief Complaint: [facility]: 81 y.o. male who presented to [facility] with falls and SOB. Notes he lives alone in an assisted living apartment. States he was recently diagnosed with lung cancer. Is in the process of that work-up. Was diagnosed with COVID. He was vaccinated with both doses. He was home and became weak. He fell multiple times. Came to the ED and found to be hypoxic. Started on oxygen. He does not wear oxygen at baseline. Subjective: Feeling about the same today. Mari PO. No events. Medications:  Reviewed    Infusion Medications    sodium chloride      dextrose       Scheduled Medications    aspirin  81 mg Oral Daily    escitalopram  10 mg Oral Daily    rosuvastatin  40 mg Oral Daily    tamsulosin  0.4 mg Oral Daily    sodium chloride flush  5-40 mL IntraVENous 2 times per day    heparin (porcine)  5,000 Units SubCUTAneous 3 times per day    methylPREDNISolone  40 mg IntraVENous Q12H    Vitamin D  2,000 Units Oral Daily    insulin glargine  10 Units SubCUTAneous Nightly    insulin lispro  0-12 Units SubCUTAneous TID WC    insulin lispro  0-6 Units SubCUTAneous Nightly     PRN Meds: sodium chloride flush, sodium chloride, ondansetron **OR** ondansetron, polyethylene glycol, acetaminophen **OR** acetaminophen, glucose, dextrose, glucagon (rDNA), dextrose      Intake/Output Summary (Last 24 hours) at 10/11/2021 1427  Last data filed at 10/11/2021 1350  Gross per 24 hour   Intake 960 ml   Output    Net 960 ml       Physical Exam Performed:    /66   Pulse 83   Temp 98.4 °F (36.9 °C) (Oral)   Resp 18   Ht 5' 8\" (1.727 m)   Wt 140 lb (63.5 kg)   SpO2 92%   BMI 21.29 kg/m²     General appearance: Frail, elderly. HEENT: Pupils equal, round, and reactive to light. Conjunctivae/corneas clear. Neck: Supple, with full range of motion. No jugular venous distention. Trachea midline. Respiratory:  Normal respiratory effort. Clear to auscultation, bilaterally without Rales/Wheezes/Rhonchi. Cardiovascular: Regular rate and rhythm with normal S1/S2 without murmurs, rubs or gallops. Abdomen: Soft, non-tender, non-distended with normal bowel sounds. Musculoskeletal: Generalized weakness. Skin: Skin color, texture, turgor normal.  No rashes or lesions. Neurologic:  Neurovascularly intact without any focal sensory/motor deficits. Cranial nerves: II-XII intact, grossly non-focal.  Psychiatric: Alert and oriented, thought content appropriate, normal insight  Capillary Refill: Brisk,3 seconds, normal   Peripheral Pulses: +2 palpable, equal bilaterally       Labs:   Recent Labs     10/10/21  1009 10/11/21  0705   WBC 28.2* 38.7*   HGB 13.0* 12.0*   HCT 39.8* 36.6*   PLT 93* 94*     Recent Labs     10/10/21  1009 10/11/21  0705   * 136   K 4.1 4.1   CL 97* 102   CO2 22 21   BUN 43* 48*   CREATININE 2.7* 2.6*   CALCIUM 9.1 8.9     Recent Labs     10/10/21  1009   AST 99*   ALT 44*   BILITOT 1.1*   ALKPHOS 51     No results for input(s): INR in the last 72 hours. Recent Labs     10/10/21  1009   TROPONINI 0.01       Urinalysis:      Lab Results   Component Value Date    NITRU Negative 10/10/2021    WBCUA 0-2 10/10/2021    BACTERIA 2+ 01/02/2020    RBCUA None seen 10/10/2021    BLOODU LARGE 10/10/2021    SPECGRAV 1.025 10/10/2021    GLUCOSEU Negative 10/10/2021       Radiology:  XR THORACIC SPINE (MIN 4 VIEWS)   Final Result   Multiple old compression fractures status post vertebral body enhancement T10   and T12      No acute fracture definitely identified         XR CHEST PORTABLE   Final Result   No acute cardiopulmonary disease      Stable interstitial opacities, likely fibrotic.              Assessment/Plan:    Active Hospital Problems    Diagnosis     COVID-19 [U07.1]      Date of symptom onset - September 27   Date of diagnosis - 10/4/21  Date of admission - 10/10/21  Vaccinated - both doses approx 6 months ago  CXR - fibrotic changes  Troponin and EKG -<0.01, sinus, RBBB, PVCs  Supplemental oxygen : -  currently on 1-2LPM to maintain sats >94%, wean as tolerated  Labs : ferritin, daily D-dimer, CRP for prognostication  Steroids : IV Solu-Medrol 40 mg twice daily   Antiviral : IV remdesivir - unable to use as with CKD  IV antibiotics : hold, low suspicion of concomitant bacterial pneumonia but will start if needed, follow fever curve as WBC not helpful in setting of chronic lymphocytosis  Anticoagulation : heparin  IV fluids :  Avoid overzealous fluids  Intubation/MV : discussed with patient, patient agreeable if needed  Consider Tocilizumab if CRP >75   Continue excellent supportive care, QD family updates, droplet plus isolation     Lung cancer - recent diagnosis. Started work-up as outpt? No records that I can fine. Oncology consulted.      Lymphocytosis - chronically elevated WBCs. Monitor CBC. Oncology consulted.      HLD - continue statin - monitor LFTs. Stable.     DM2- SSI and lantus. Monitor BS as will be elevated due to steroids and illness. A1c 7.5 July, 2021.      CKD - stage 4 - BL 2-2.5 BL. Currently 2.6. Monitor BMP.      Renal Osteodystrophy - continue Vit D - 2000IU daily.     DVT Prophylaxis: heparin  Diet: ADULT DIET;  Regular  Code Status: Limited    PT/OT Eval Status: ordered    Dispo - 2-4 days    Roxanna Elaine MD  \

## 2021-10-12 NOTE — CARE COORDINATION
10/12/21 Attempted to assess, pt not answering phone, referral made to [facility] for possible home care, following for RW, and new home o2.

## 2021-10-12 NOTE — CONSULTS
SURGICAL HISTORY Left 06/01/2021    cystoscopy, rmairez catheter placement (Left Bladder) nephrostomy placement left pelvis left percutaneous nephrolithotomy       Current Medications:     Current Facility-Administered Medications   Medication Dose Route Frequency Provider Last Rate Last Admin    insulin glargine (LANTUS) injection vial 20 Units  20 Units SubCUTAneous Nightly Brody Love MD   20 Units at 10/11/21 2054    aspirin chewable tablet 81 mg  81 mg Oral Daily Brody Love MD   81 mg at 10/12/21 0953    escitalopram (LEXAPRO) tablet 10 mg  10 mg Oral Daily Brody Love MD   10 mg at 10/12/21 0034    rosuvastatin (CRESTOR) tablet 40 mg  40 mg Oral Daily Brody oLve MD   40 mg at 10/12/21 0952    tamsulosin (FLOMAX) capsule 0.4 mg  0.4 mg Oral Daily Brody Love MD   0.4 mg at 10/12/21 5541    sodium chloride flush 0.9 % injection 5-40 mL  5-40 mL IntraVENous 2 times per day Brody Love MD   10 mL at 10/12/21 0953    sodium chloride flush 0.9 % injection 5-40 mL  5-40 mL IntraVENous PRN Brody Love MD   10 mL at 10/12/21 0319    0.9 % sodium chloride infusion  25 mL IntraVENous PRN Brody Love MD        ondansetron (ZOFRAN-ODT) disintegrating tablet 4 mg  4 mg Oral Q8H PRN Brody Love MD        Or    ondansetron (ZOFRAN) injection 4 mg  4 mg IntraVENous Q6H PRN Brody Love MD        polyethylene glycol (GLYCOLAX) packet 17 g  17 g Oral Daily PRN Brody Love MD        acetaminophen (TYLENOL) tablet 650 mg  650 mg Oral Q6H PRN Brody Love MD   650 mg at 10/12/21 0053    Or    acetaminophen (TYLENOL) suppository 650 mg  650 mg Rectal Q6H PRN Brody Love MD        heparin (porcine) injection 5,000 Units  5,000 Units SubCUTAneous 3 times per day Brody Love MD   5,000 Units at 10/12/21 0617    methylPREDNISolone sodium (SOLU-MEDROL) injection 40 mg  40 mg IntraVENous Q12H Brody Love MD   40 mg at 10/12/21 9001    Vitamin D (CHOLECALCIFEROL) tablet 2,000 Units  2,000 Units Oral Daily Jose Luis Roy MD   2,000 Units at 10/12/21 0953    insulin lispro (HUMALOG) injection vial 0-12 Units  0-12 Units SubCUTAneous TID WC Jose Luis Roy MD   6 Units at 10/11/21 1706    insulin lispro (HUMALOG) injection vial 0-6 Units  0-6 Units SubCUTAneous Nightly Jose Luis Roy MD   2 Units at 10/11/21 2053    glucose (GLUTOSE) 40 % oral gel 15 g  15 g Oral PRN Jose Luis Roy MD        dextrose 50 % IV solution  12.5 g IntraVENous PRN Jose Luis oRy MD        glucagon (rDNA) injection 1 mg  1 mg IntraMUSCular PRN Jose Luis Roy MD        dextrose 5 % solution  100 mL/hr IntraVENous PRN Jose Luis Roy MD           Allergies:     No Known Allergies    Social History:     Social History     Socioeconomic History    Marital status:      Spouse name: Not on file    Number of children: Not on file    Years of education: Not on file    Highest education level: Not on file   Occupational History    Not on file   Tobacco Use    Smoking status: Former Smoker     Packs/day: 1.00     Years: 10.00     Pack years: 10.00     Types: Cigarettes     Quit date: 3/19/1978     Years since quittin.5    Smokeless tobacco: Never Used    Tobacco comment: quit 25 years ago   Vaping Use    Vaping Use: Never used   Substance and Sexual Activity    Alcohol use: Yes     Comment: on occasion    Drug use: No    Sexual activity: Not on file   Other Topics Concern    Not on file   Social History Narrative    Not on file     Social Determinants of Health     Financial Resource Strain: Low Risk     Difficulty of Paying Living Expenses: Not hard at all   Food Insecurity: No Food Insecurity    Worried About Running Out of Food in the Last Year: Never true    Bruno of Food in the Last Year: Never true   Transportation Needs:     Lack of Transportation (Medical):      Lack of Transportation (Non-Medical):    Physical Activity:  Days of Exercise per Week:     Minutes of Exercise per Session:    Stress:     Feeling of Stress :    Social Connections:     Frequency of Communication with Friends and Family:     Frequency of Social Gatherings with Friends and Family:     Attends Hoahaoism Services:     Active Member of Clubs or Organizations:     Attends Club or Organization Meetings:     Marital Status:    Intimate Partner Violence:     Fear of Current or Ex-Partner:     Emotionally Abused:     Physically Abused:     Sexually Abused:      Social History     Substance and Sexual Activity   Drug Use No     Social History     Substance and Sexual Activity   Alcohol Use Yes    Comment: on occasion     Social History     Substance and Sexual Activity   Sexual Activity Not on file     Social History     Tobacco Use   Smoking Status Former Smoker    Packs/day: 1.00    Years: 10.00    Pack years: 10.00    Types: Cigarettes    Quit date: 3/19/1978    Years since quittin.5   Smokeless Tobacco Never Used   Tobacco Comment    quit 25 years ago       Family History:     Family History   Problem Relation Age of Onset    Heart Disease Father     Heart Disease Sister        Review of Systems:     Constitutional: Denies fever, sweats, weight loss. .     Eyes: No visual changes or diplopia. No scleral icterus. ENT: No Headaches, no hearing loss, no  vertigo. No mouth sores or sore throat. Cardiovascular: No chest pain, no dyspnea on exertion, no palpitations, no  loss of consciousness. Respiratory: SEE HPI   Gastrointestinal: No abdominal pain, no appetite loss, no blood in stools. No change in bowel habits. Genitourinary: No dysuria, trouble voiding, or hematuria. Musculoskeletal:  SEE HPI   Integumentary: No rash or pruritis. Neurological: No headache, diplopia. No change in gait, balance, or coordination. No paresthesias. Endocrine: No temperature intolerance. No excessive thirst, fluid intake, or urination. Hematologic/Lymphatic: No abnormal bruising or ecchymoses, no blood clots or swollen lymph nodes. Allergic/Immunologic: No nasal congestion or hives. Physical Exam:     BP (!) 115/55   Pulse 78   Temp 98.2 °F (36.8 °C)   Resp 16   Ht 5' 8\" (1.727 m)   Wt 140 lb (63.5 kg)   SpO2 91%   BMI 21.29 kg/m²     CONSTITUTIONAL: awake, alert, cooperative, no apparent distress. EYES:  Pupils equal, round and reactive to light, sclera non-icteric, conjunctiva normal  ENT:  Normocephalic, without obvious abnormality, atraumatic, sinuses nontender on palpation, external ears without lesions, oral pharynx with moist mucus membranes, no mucositis. NECK:  Supple, symmetrical, trachea midline, no adenopathy, thyroid symmetric, not enlarged and no tenderness, skin normal  HEMATOLOGIC/LYMPHATICS:  no cervical lymphadenopathy, no supraclavicular lymphadenopathy, no axillary lymphadenopathy and no inguinal lymphadenopathy  BACK:  Symmetric, no curvature, spinous processes are non-tender on palpation, paraspinous muscles are non-tender on palpation, no costal vertebral tenderness  LUNGS:  Diffuse rhonchi to bilateral anterior chest walls with diminished posterior bases   CARDIOVASCULAR:  Regular rate and rhythm, normal S1 and S2, no S3 or S4, and no murmur noted  ABDOMEN:  Normal bowel sounds, soft, non-distended, non-tender, no masses palpated, no hepatosplenomegally  MUSCULOSKELETAL:  There is no redness, warmth, or swelling of the joints  NEUROLOGIC:   No focal findings. SKIN:  Scattered bruising and ecchymoses. EXT: without clubbing, cyanosis or edema.       Data:     CBC:  Recent Labs     10/11/21  0705 10/10/21  1009 10/06/21  1917   WBC 38.7* 28.2* 46.9*   HGB 12.0* 13.0* 13.5   HCT 36.6* 39.8* 41.3   MCV 86.8 88.2 87.5   PLT 94* 93* 92*       BMP:  Recent Labs     10/11/21  0705 10/10/21  1009 10/06/21  1917    133* 135*   K 4.1 4.1 4.3   CO2 21 22 24   BUN 48* 43* 29*   CREATININE 2.6* 2.7* 2.3* HEPATIC:  Recent Labs     10/10/21  1009   AST 99*   ALT 44*   ALKPHOS 51   PROT 6.5   BILITOT 1.1*       TUMOR MARKERS:  No results for input(s): PSA, CEA, , LL0512,  in the last 720 hours. MAGNESIUM:    No results found for: MG    PT/INR:    No results found for: PTINR    Lab Results   Component Value Date    INR 1.12 06/01/2021       PTT:    No results found for: APTT    U/A:    Lab Results   Component Value Date    NITRITE neg 01/14/2020    COLORU Yellow 10/10/2021    PHUR 5.5 10/10/2021    LABCAST 0-1 Coarse Gran 04/12/2017    WBCUA 0-2 10/10/2021    RBCUA None seen 10/10/2021    MUCUS 1+ 03/11/2016    BACTERIA 2+ 01/02/2020    CLARITYU Clear 10/10/2021    SPECGRAV 1.025 10/10/2021    LEUKOCYTESUR Negative 10/10/2021    UROBILINOGEN 0.2 10/10/2021    BILIRUBINUR Negative 10/10/2021    BILIRUBINUR neg 01/14/2020    BLOODU LARGE 10/10/2021    GLUCOSEU Negative 10/10/2021    AMORPHOUS 1+ 10/10/2021       Imaging:     Chronic obstructive lung changes with bibasilar fibrosis and scarring and   bronchiectasis with subpleural interstitial opacities which is more prominent   on the right and is slightly increased which could be due to progressive   fibrosis or early pneumonitis recommend short-term follow-up       Small pleural-based nodules along the lung bases posteriorly which are   unchanged with no new nodule seen       Mildly dilated and atherosclerotic thoracic aorta with moderate coronary   artery calcifications which is unchanged.       Small left renal stone.       Cholelithiasis which is unchanged. Impression:     COVID 19   Weakness  CLL   Pulm fibrosis /COPD   parkinsons   Falls   SOB     Plan:     Hx of CLL  - Does not need therapy now; on observation only'; CBC stable.  Last seen in the office by Dr. Camden Gurrola on 9-20-21   - send [address] [place] [address]?  - Patient reports being treated for lung ca by Dr. Camden Gurrola   - There is nothing in the record to substantiate this   - The patient may be confused by his CT chest with fibrosis and nodules; nodules improved in size and stable on CT chest this admit          Thank you very much for allowing me to participate in the care of this patient.     Angélica David CNP   Medical Oncology/Hematology    [facility] - [address],  [place]   Phone: [phone]  Fax: [phone]

## 2021-10-12 NOTE — PROGRESS NOTES
Physical Therapy  Facility/Department: [facility] - MED SURG/ORTHO  Daily Treatment Note  NAME: Maritza Portillo  : 1935  MRN: [id number]    Date of Service: 10/12/2021    Discharge Recommendations:  Subacute/Skilled Nursing Facility   PT Equipment Recommendations  Equipment Needed: No (CTA, may need RW)    Assessment   Body structures, Functions, Activity limitations: Decreased functional mobility ; Decreased balance;Decreased strength;Decreased endurance  Assessment: Pt pleasant and agreeable to PT session this date. Pt tolerates increased activity this date with 4 L O2. Pt requires up to min(A) for gait, would benefit from trial with cane or RW to improve balance and increase independence. Pt requires extended seated rest breaks to recover from activity with decreased SpO2 to 80% on 4 L with ambulation. Pt would benefit from continued skilled PT to address deficits. Pt may benefit from SNF upon d/c due to decreased endurance/decreased SpO2 on 4 L O2, limited assistance available upon d/c, and multiple steps to enter apartment. Treatment Diagnosis: impaired endurance  Prognosis: Good  Decision Making: Medium Complexity  PT Education: Goals; General Safety;PT Role;Disease Specific Education;Plan of Care; Functional Mobility Training;Transfer Training  Patient Education: Pt educated on PLB, use of RW to improve balance and benefits of continued mobility- verbalizes understanding  REQUIRES PT FOLLOW UP: Yes  Activity Tolerance  Activity Tolerance: Patient Tolerated treatment well;Patient limited by endurance  Activity Tolerance: Vitals at rest: /62, HR 79 bpm, Spo2 93% on 4 L O2; Vitals with activity:HR 97 bpm, Spo2 79% on 4 L improving to 90% with seated rest break and PLB within 2 minutes     Patient Diagnosis(es): The primary encounter diagnosis was Generalized weakness.  Diagnoses of COVID-19, LEONOR (acute kidney injury) ([facility] [address] Hyponatremia, Leukocytosis, unspecified type, Elevated LFTs, Thrombocytopenia ([facility] [address] Hypoxia, and Elevated d-dimer were also pertinent to this visit. has a past medical history of Back pain, Cancer ([facility] [address] Chronic kidney disease, COVID-19, Diabetes mellitus ([facility] [address] Essential tremor, History of BPH, Hypercholesteremia, Renal insufficiency, and Type 2 diabetes mellitus with hyperglycemia ([facility] [address] has a past surgical history that includes Cataract removal; Cystocopy; Kyphosis surgery (8/24/2012); other surgical history (Left, 06/01/2021); Kidney stone removal (Left, 6/1/2021); Nephrostomy (Left, 6/1/2021); Nephrostomy (Left, 6/1/2021); and hernia repair (6556). Restrictions  Restrictions/Precautions  Restrictions/Precautions: General Precautions, Isolation, Fall Risk  Position Activity Restriction  Other position/activity restrictions: 3 L O 2, telemetry, Up with Assist     Subjective    Pt supine in bed upon arrival, agreeable to PT session. Denies pain this session. Objective   Bed mobility  Supine to Sit: Contact guard assistance (HOB elevated, no bedrails)  Sit to Supine: Unable to assess  Scooting: Contact guard assistance (scooting to EOB)     Transfers  Sit to Stand: Contact guard assistance  Stand to sit: Contact guard assistance  Comment: no device     Ambulation  Ambulation?: Yes  Ambulation 1  Surface: level tile  Device: No Device  Other Apparatus: O2 (4L)  Assistance: Minimal assistance  Quality of Gait: moderately unsteady, increased LILIANA, slow sujit, decreased step length B  Distance: 15 ft + 30 ft  Comments: CGA-min(A) for balance, 2 LOB sideways requiring assistance to correct. Pt reaching for walls and bed during ambulation. Educated on benefits of RW during recovery to reduce risk for falls.  Rates RPE 6/10      Balance  Sitting - Static: Good  Sitting - Dynamic: Good (supervision)  Standing - Static: Fair  Standing - Dynamic: Fair;- (min(A) occasionally for balance recovery)     Exercises  Gluteal Sets: Bx10  Hip Flexion: Bx10  Knee Long Arc Quad: Bx10  Ankle Pumps: Bx10  Comments: performed in sitting; rest break between each exercise to recover SpO2 to 90+%      AM-PAC Score  AM-PAC Inpatient Mobility Raw Score : 17 (10/12/21 1501)  AM-PAC Inpatient T-Scale Score : 42.13 (10/12/21 1501)  Mobility Inpatient CMS 0-100% Score: 50.57 (10/12/21 1501)  Mobility Inpatient CMS G-Code Modifier : CK (10/12/21 1501)          Goals  Short term goals  Time Frame for Short term goals: 7 days ( 10/18/2021)  Short term goal 1: Pt will perform bed mobility independently--10/12 CGA  Short term goal 2: Pt will perform SPT and STS with cane and supervision--10/12 CGA  Short term goal 3: Pt will ambulate 50 ft with supervision and LRAD--10/12 min(A) up to 30 ft  Short term goal 4: Pt will perform 12-15 reps of BLE exercises to demo understanding of HEP--10/12 10 reps  Patient Goals   Patient goals : \"to be independent\"    Plan    Plan  Times per week: 3-5x/week  Current Treatment Recommendations: Strengthening, Neuromuscular Re-education, Home Exercise Program, Safety Education & Training, Patient/Caregiver Education & Training, Endurance Training, Balance Training, Functional Mobility Training, Transfer Training, Gait Training, Stair training  Safety Devices  Type of devices: All fall risk precautions in place, Nurse notified, Call light within reach, Gait belt, Patient at risk for falls, Left in chair, Chair alarm in place     Therapy Time   Individual Concurrent Group Co-treatment   Time In 1444         Time Out 1509         Minutes 25         Timed Code Treatment Minutes: 25 Minutes     If pt is unable to be seen after this session, please let this note serve as discharge summary. Please see case management note for discharge disposition. Thank you.     Eve Lr, PT

## 2021-10-12 NOTE — CONSULTS
Infectious Diseases   Consult Note      Reason for Consult:   COVID   Requesting Physician:  Debra Scott MD       Date of Admission: 10/10/2021  Subjective:   CHIEF COMPLAINT:  None given       HPI:    Graciela Carpio is an 81yoM with history of CKD, DM, HLD, BPH, DM  History of CLL, never treated     ED 10/6/21 - 3d history cough, congestion, managed symptomatically. Returned to ED 10/10/10 - weakness, SOB, hypoxemia  COVID NAAT+  WBC was 28, platelets 93  Currently denies SOB at rest though feels very SOB with activity. +Cough    On my entry, SpO2 was 78% on 4L NC with good waveform though on with ear monitor it has been higher. Current abx:  None     Solumedrol 40 q12  SQ heparin 5000 q8       Past Surgical History:       Diagnosis Date    Back pain     Cancer ([address]     skin    Chronic kidney disease     COVID-19 10/10/2021    Diabetes mellitus ([address]     Essential tremor     History of BPH     Hypercholesteremia     Renal insufficiency     Type 2 diabetes mellitus with hyperglycemia ([address] 12/21/2017         Procedure Laterality Date    CATARACT REMOVAL      CYSTOSCOPY     [facility]    KIDNEY STONE REMOVAL Left 6/1/2021    LEFT PERCUTANEOUS NEPHROLITHOTOMY performed by Telly Crystal MD at [address] [facility]  8/24/2012    KYPHOPLASTY T10 AND T12    NEPHROSTOMY Left 6/1/2021    cystoscopy, ramirez catheter placement performed by Telly Crystal MD at [facility] NEPHROSTOMY Left 6/1/2021    NEPHROSTOM[facility] PLACEMENT [facility] 06/01/2021    cystoscopy, ramirez catheter placement (Left Bladder) nephrostomy placement left pelvis left percutaneous nephrolithotomy       Social History:    TOBACCO:   reports that he quit smoking about 43 years ago. His smoking use included cigarettes. He has a 10.00 pack-year smoking history. He has never used smokeless tobacco.  ETOH:   reports current alcohol use.   There is no history of illicit drug use or other significant epidemiologic exposures.   Lives alone       Family History:       Problem Relation Age of Onset    Heart Disease Father     Heart Disease Sister        Current Medications:    Current Facility-Administered Medications: prochlorperazine (COMPAZINE) injection 5 mg, 5 mg, IntraVENous, Q6H PRN  insulin glargine (LANTUS) injection vial 20 Units, 20 Units, SubCUTAneous, Nightly  aspirin chewable tablet 81 mg, 81 mg, Oral, Daily  escitalopram (LEXAPRO) tablet 10 mg, 10 mg, Oral, Daily  rosuvastatin (CRESTOR) tablet 40 mg, 40 mg, Oral, Daily  tamsulosin (FLOMAX) capsule 0.4 mg, 0.4 mg, Oral, Daily  sodium chloride flush 0.9 % injection 5-40 mL, 5-40 mL, IntraVENous, 2 times per day  sodium chloride flush 0.9 % injection 5-40 mL, 5-40 mL, IntraVENous, PRN  0.9 % sodium chloride infusion, 25 mL, IntraVENous, PRN  polyethylene glycol (GLYCOLAX) packet 17 g, 17 g, Oral, Daily PRN  acetaminophen (TYLENOL) tablet 650 mg, 650 mg, Oral, Q6H PRN **OR** acetaminophen (TYLENOL) suppository 650 mg, 650 mg, Rectal, Q6H PRN  heparin (porcine) injection 5,000 Units, 5,000 Units, SubCUTAneous, 3 times per day  methylPREDNISolone sodium (SOLU-MEDROL) injection 40 mg, 40 mg, IntraVENous, Q12H  Vitamin D (CHOLECALCIFEROL) tablet 2,000 Units, 2,000 Units, Oral, Daily  insulin lispro (HUMALOG) injection vial 0-12 Units, 0-12 Units, SubCUTAneous, TID WC  insulin lispro (HUMALOG) injection vial 0-6 Units, 0-6 Units, SubCUTAneous, Nightly  glucose (GLUTOSE) 40 % oral gel 15 g, 15 g, Oral, PRN  dextrose 50 % IV solution, 12.5 g, IntraVENous, PRN  glucagon (rDNA) injection 1 mg, 1 mg, IntraMUSCular, PRN  dextrose 5 % solution, 100 mL/hr, IntraVENous, PRN      No Known Allergies       REVIEW OF SYSTEMS:    CONSTITUTIONAL:   Per HPI   EYES:  negative for eye discharge, acute visual disturbance and icterus  HEENT:  negative for acute hearing loss, tinnitus, ear drainage, sinus pressure, nasal congestion, epistaxis and snoring  RESPIRATORY:   Per HPI   CARDIOVASCULAR:  negative for chest pain, palpitations, exertional chest pressure/discomfort, edema, syncope  GASTROINTESTINAL:  negative for nausea, vomiting, diarrhea, constipation, blood in stool and abdominal pain  GENITOURINARY:  negative for frequency, dysuria, urinary incontinence, decreased urine volume, and hematuria  HEMATOLOGIC/LYMPHATIC:  negative for easy bruising, bleeding and lymphadenopathy  ALLERGIC/IMMUNOLOGIC:  negative for recurrent infections, angioedema, anaphylaxis and drug reactions  ENDOCRINE:  negative for weight changes and diabetic symptoms including polyuria, polydipsia and polyphagia  MUSCULOSKELETAL:  negative for acute joint swelling, decreased range of motion and muscle weakness  NEUROLOGICAL:  negative for headaches, slurred speech, unilateral weakness  PSYCHIATRIC/BEHAVIORAL: negative for hallucinations, behavioral problems, confusion and agitation. Objective:   PHYSICAL EXAM:      VITALS:  /62   Pulse 87   Temp 98.4 °F (36.9 °C) (Oral)   Resp 16   Ht 5' 8\" (1.727 m)   Wt 140 lb (63.5 kg)   SpO2 92%   BMI 21.29 kg/m²      24HR INTAKE/OUTPUT:      Intake/Output Summary (Last 24 hours) at 10/12/2021 1712  Last data filed at 10/12/2021 0951  Gross per 24 hour   Intake 220 ml   Output    Net 220 ml     CONSTITUTIONAL:  Frail elderly male   Awake, alert, cooperative, no apparent distress, and appears stated age  [de-identified]: NCAT, PERRL, EOMI. Sclera white, conjunctiva full. OP with moist mucosal membranes, no thrush, tongue protrudes midline  NECK:  Supple, symmetrical, trachea midline, no adenopathy  LUNGS:  no increased work of breathing  CARDIOVASCULAR:  RRR  ABDOMEN:  normal bowel sounds, soft, flat, NT   PSYCHIATRIC: Oriented to person place and time. No obvious depression or anxiety. MUSCULOSKELETAL: No obvious misalignment or effusion of the joints. No clubbing, cyanosis of the digits.   SKIN:  normal skin color, texture, turgor and no redness, warmth, or swelling. No palpable nodules or stigmata of embolic phenomenon  NEUROLOGIC: nonfocal exam  ACCESS:   IV site ok       DATA:    Old records have been reviewed    CBC:  Recent Labs     10/10/21  1009 10/11/21  0705   WBC 28.2* 38.7*   RBC 4.51 4.22   HGB 13.0* 12.0*   HCT 39.8* 36.6*   PLT 93* 94*   MCV 88.2 86.8   MCH 28.7 28.5   MCHC 32.6 32.9   RDW 16.3* 16.3*   BANDSPCT 3 3      BMP:  Recent Labs     10/10/21  1009 10/11/21  0705   * 136   K 4.1 4.1   CL 97* 102   CO2 22 21   BUN 43* 48*   CREATININE 2.7* 2.6*   CALCIUM 9.1 8.9   GLUCOSE 228* 239*        CRP   57  Ferritin  622  ddimer  359      Cultures:   10/10 COVID NAAT+      Radiology Review:  All pertinent images / reports were reviewed as a part of this visit. CXR 10/10/21  Impression   No acute cardiopulmonary disease       Stable interstitial opacities, likely fibrotic.        Assessment:     Patient Active Problem List   Diagnosis    MVP (mitral valve prolapse)    Hyperlipemia    BPH (benign prostatic hyperplasia)    IGT (impaired glucose tolerance)    Dysmetabolic syndrome X    Benign essential tremor    HTN (hypertension)    Hematuria    Renal insufficiency    Diabetes mellitus ([address]    Murmur, cardiac    Thoracic compression fracture (HCC)    Bradycardia    Type 2 diabetes mellitus with hyperglycemia (HCC)    Right wrist pain    Abrasion of right forearm    Abrasion of left forearm    Lymphocytosis    Chronic kidney disease, stage III (moderate) (HCC)    Essential tremor    DM (diabetes mellitus), secondary, uncontrolled, w/neurologic complic ([address]    HTN (hypertension), benign    Dyslipidemia    Chronic kidney disease (CKD), stage IV (severe) (HCC)    Renal calculi    Chronic lymphocytic leukemia ([address]    COVID-19       COVID-19 and acute hypoxemic respiratory failure   Fully vaccinated x2 doses, 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 In Cov-Barrier trial, GFR <30 was an exclusion criteria. Could consider giving toci though CRP has been <75 which was used in Recovery trial.  There is limited data on impact of these therapies in similarly immunosuppressed patients. Will see how he does overnight and what renal function looks like in AM to guide decisions re immunomodulatory therapies. -continue steroid   -DVT ppx  -outside window of benefit from mab, antiviral therapy     CLL    CKD at baseline     CEASAR Chauhan M.D. Thank you for the opportunity to participate in the care of your patient.     Please do not hesitate to contact me:   [phone] office

## 2021-10-12 NOTE — PROGRESS NOTES
Hospitalist Progress Note      PCP: Domo Eastman MD    Date of Admission: 10/10/2021    Chief Complaint: [facility] Course: 81 y. o. male who presented to [facility] with falls and SOB. Notes he lives alone in an assisted living apartment. States he was recently diagnosed with lung cancer. Is in the process of that work-up. Was diagnosed with COVID. He was vaccinated with both doses. He was home and became weak. He fell multiple times. Came to the ED and found to be hypoxic. Started on oxygen. He does not wear oxygen at baseline.      Subjective: Feeling improved from admission, on 4L oxygen(not on any at home), no overnight issues    Medications:  Reviewed    Infusion Medications    sodium chloride      dextrose       Scheduled Medications    insulin glargine  20 Units SubCUTAneous Nightly    aspirin  81 mg Oral Daily    escitalopram  10 mg Oral Daily    rosuvastatin  40 mg Oral Daily    tamsulosin  0.4 mg Oral Daily    sodium chloride flush  5-40 mL IntraVENous 2 times per day    heparin (porcine)  5,000 Units SubCUTAneous 3 times per day    methylPREDNISolone  40 mg IntraVENous Q12H    Vitamin D  2,000 Units Oral Daily    insulin lispro  0-12 Units SubCUTAneous TID WC    insulin lispro  0-6 Units SubCUTAneous Nightly     PRN Meds: sodium chloride flush, sodium chloride, ondansetron **OR** ondansetron, polyethylene glycol, acetaminophen **OR** acetaminophen, glucose, dextrose, glucagon (rDNA), dextrose      Intake/Output Summary (Last 24 hours) at 10/12/2021 1222  Last data filed at 10/12/2021 0951  Gross per 24 hour   Intake 520 ml   Output    Net 520 ml       Physical Exam Performed:    BP (!) 115/55   Pulse 78   Temp 98.2 °F (36.8 °C)   Resp 16   Ht 5' 8\" (1.727 m)   Wt 140 lb (63.5 kg)   SpO2 91%   BMI 21.29 kg/m²          General appearance: Frail, elderly. HEENT: Pupils equal, round, and reactive to light. Conjunctivae/corneas clear.   Neck: Supple, with full range of motion. No jugular venous distention. Trachea midline. Respiratory:  improved respiratory effort. Clear to auscultation, bilaterally without Wheezes/Rhonchi. Fine rales noted  Cardiovascular: Regular rate and rhythm with normal S1/S2 without murmurs, rubs or gallops. Abdomen: Soft, non-tender, non-distended with normal bowel sounds. Musculoskeletal: Generalized weakness. Skin: Skin color, texture, turgor normal.  No rashes or lesions. Neurologic:  Neurovascularly intact without any focal sensory/motor deficits. Cranial nerves: II-XII intact, grossly non-focal.  Psychiatric: Alert and oriented, thought content appropriate, normal insight  Capillary Refill: Brisk,3 seconds, normal   Peripheral Pulses: +2 palpable, equal bilaterally       Labs:   Recent Labs     10/10/21  1009 10/11/21  0705   WBC 28.2* 38.7*   HGB 13.0* 12.0*   HCT 39.8* 36.6*   PLT 93* 94*     Recent Labs     10/10/21  1009 10/11/21  0705   * 136   K 4.1 4.1   CL 97* 102   CO2 22 21   BUN 43* 48*   CREATININE 2.7* 2.6*   CALCIUM 9.1 8.9     Recent Labs     10/10/21  1009   AST 99*   ALT 44*   BILITOT 1.1*   ALKPHOS 51     No results for input(s): INR in the last 72 hours. Recent Labs     10/10/21  1009   TROPONINI 0.01       Urinalysis:      Lab Results   Component Value Date    NITRU Negative 10/10/2021    WBCUA 0-2 10/10/2021    BACTERIA 2+ 01/02/2020    RBCUA None seen 10/10/2021    BLOODU LARGE 10/10/2021    SPECGRAV 1.025 10/10/2021    GLUCOSEU Negative 10/10/2021       Radiology:  XR THORACIC SPINE (MIN 4 VIEWS)   Final Result   Multiple old compression fractures status post vertebral body enhancement T10   and T12      No acute fracture definitely identified         XR CHEST PORTABLE   Final Result   No acute cardiopulmonary disease      Stable interstitial opacities, likely fibrotic.                  Assessment/Plan:    Active Hospital Problems    Diagnosis     COVID-19 [U07.1]      Acute hypoxic resp failure- due to covid-19  Date of symptom onset - September 27   Date of diagnosis - 10/4/21  Date of admission - 10/10/21  Vaccinated - both doses of Moderna approx 6 months ago  CXR - fibrotic changes  Troponin and EKG -<0.01, sinus, RBBB, PVCs  Supplemental oxygen : -  currently on 1-2LPM to maintain sats >94%, wean as tolerated  Labs : ferritin, daily D-dimer, CRP for prognostication  Steroids : IV Solu-Medrol 40 mg twice daily   Antiviral : IV remdesivir - unable to use as with CKD  IV antibiotics : hold, low suspicion of concomitant bacterial pneumonia but will start if needed, follow fever curve as WBC not helpful in setting of chronic lymphocytosis  Anticoagulation : heparin  IV fluids :  Avoided overzealous fluids  Intubation/MV : discussed with patient, patient agreeable if needed  Consider Tocilizumab if CRP >75   Continue excellent supportive care, QD family updates, droplet plus isolation     Questionable Lung cancer - recent diagnosis per pt. Started work-up as outpt? No records that were found  Oncology consulted, recent CT chest noted fibrosis and nodules(improved in size per note)     Lymphocytosis - chronically elevated WBCs. Monitor CBC. Oncology consulted(noted hx of CLL, no need for tx now, obs only)  -IgG sent by onc        HLD - continue statin - monitor LFTs. Stable.     DM2- SSI and lantus. Monitor BS as will be elevated due to steroids and illness. A1c 7.5 July, 2021.      CKD - stage 4 - BL 2-2.5 BL. Currently 2.6. Monitor BMP.      Renal Osteodystrophy - continue Vit D - 2000IU daily.     DVT Prophylaxis: heparin  Diet: ADULT DIET;  Regular  Code Status: Limited    PT/OT Eval Status: ordered and rec 24h sup/assist vs home pt/ot     Dispo - 2-4 days, pending improved oxygenation    Doug Erickson MD

## 2021-10-12 NOTE — CARE COORDINATION
[facility]    Referral received from  to follow for home care services. I will follow for needs, and speak with patient to verify demos.     Fernanda Urena RN, BSN CTN  [facility] [phone]

## 2021-10-13 NOTE — ONCOLOGY
ONCOLOGY HEMATOLOGY CARE PROGRESS NOTE      SUBJECTIVE:     Afebrile. On 4 LPM by NC. A/O x 3. Ambulating about the room. ROS:   The remaining 10 point review of symptoms is unremarkable. OBJECTIVE        Physical    VITALS:  BP (!) 117/59   Pulse 75   Temp 98.2 °F (36.8 °C) (Oral)   Resp 20   Ht 5' 8\" (1.727 m)   Wt 140 lb (63.5 kg)   SpO2 94%   BMI 21.29 kg/m²   TEMPERATURE:  Current - Temp: 98.2 °F (36.8 °C); Max - Temp  Av.3 °F (36.8 °C)  Min: 98.2 °F (36.8 °C)  Max: 98.5 °F (36.9 °C)  PULSE OXIMETRY RANGE: SpO2  Av.3 %  Min: 91 %  Max: 94 %  24HR INTAKE/OUTPUT:    Intake/Output Summary (Last 24 hours) at 10/13/2021 0542  Last data filed at 10/12/2021 0951  Gross per 24 hour   Intake 120 ml   Output    Net 120 ml       CONSTITUTIONAL:  awake, alert, cooperative, no apparent distress, HEENT oral pharynx , no scleral icterus  HEMATOLOGIC/LYMPHATICS:  no cervical lymphadenopathy, no supraclavicular lymphadenopathy, no axillary lymphadenopathy and no inguinal lymphadenopathy  LUNGS:  No increased work of breathing, good air exchange, clear to auscultation bilaterally, no crackles or wheezing  CARDIOVASCULAR:  , regular rate and rhythm, normal S1 and S2, no S3 or S4, and no murmur noted  ABDOMEN:  No scars, normal bowel sounds, soft, non-distended, non-tender, no masses palpated, no hepatosplenomegally  MUSCULOSKELETAL:  There is no redness, warmth, or swelling of the joints. EXTREMETIES: No clubbing cynosis or edema  NEUROLOGIC:  Awake, alert, oriented to name, place and time. Cranial nerves II-XII are grossly intact. Motor is 5 out of 5 bilaterally.    SKIN:  no bruising or bleeding      Data      Recent Labs     10/10/21  1009 10/11/21  0705   WBC 28.2* 38.7*   HGB 13.0* 12.0*   HCT 39.8* 36.6*   PLT 93* 94*   MCV 88.2 86.8        Recent Labs     10/10/21  1009 10/11/21  0705   * 136   K 4.1 4.1   CL 97* 102   CO2 22 21   BUN 43* 48* CREATININE 2.7* 2.6*     Recent Labs     10/10/21  1009   AST 99*   ALT 44*   BILITOT 1.1*   ALKPHOS 51       Magnesium:  No results found for: MG      Problem List  Patient Active Problem List   Diagnosis    MVP (mitral valve prolapse)    Hyperlipemia    BPH (benign prostatic hyperplasia)    IGT (impaired glucose tolerance)    Dysmetabolic syndrome X    Benign essential tremor    HTN (hypertension)    Hematuria    Renal insufficiency    Diabetes mellitus (HCC)    Murmur, cardiac    Thoracic compression fracture (HCC)    Bradycardia    Type 2 diabetes mellitus with hyperglycemia (HCC)    Right wrist pain    Abrasion of right forearm    Abrasion of left forearm    Lymphocytosis    Chronic kidney disease, stage III (moderate) (HCC)    Essential tremor    DM (diabetes mellitus), secondary, uncontrolled, w/neurologic complic ([address]    HTN (hypertension), benign    Dyslipidemia    Chronic kidney disease (CKD), stage IV (severe) ([address]    Renal calculi    Chronic lymphocytic leukemia [facility]    COVID-19       ASSESSMENT AND PLAN    CLL  - Diagnosed by flow cytometry of the peripheral blood on 1/25/2019.  - FISH of the peripheral blood showed del(13q14) in 34% of cells. This is a good prognosis finding.  - More recently, he had a CT on 2/19/2021 that showed a 15 cm spleen and mild retroperitoneal LAD with PLTs of 87 K/mcL. We have been observing nonetheless. - He was last seen in the office on 9/20/2021. He CLL was unchanged. His CBC at that time showed: WBC 39.2 K/mcL, HGB 14.3 g/dL, HCT 44.0%, PLT 96 K/mcL. - His CBC as an inpatient here is not too different. He can continue to observe his CLL. NSCLC  - He has not been diagnosed by me with NSCLC. COVID-19  - Defer to ID and hospitalist service. ONCOLOGIC DISPOSITION:  Per ID and the hospitalist service.     Blu Jameson MD  Please contact through [place]

## 2021-10-13 NOTE — CARE COORDINATION
CASE MANAGEMENT INITIAL ASSESSMENT      Reviewed chart and completed assessment via telephone with: Pt via room phone   Explained Case Management role/services. Health Care Decision Maker :   Primary Decision Maker: Ryland Lawson - [phone]    Secondary Decision Maker: Jacky Cordova - [phone]        Admit date/status: 10/10/21  Diagnosis: Hyponatremia, Covid 19   Is this a Readmission?:  No      Insurance: Medicare    Precert required for SNF: No       3 night stay required: Yes    Living arrangements, Adls, care needs, prior to admission: pt lives in [facility] apartments alone. He states that he is independent in all ADLs and drives. His two sons and grandson are able to assist him if needed. Transportation: Private via family      [address] at home:  Walker__Cane_x_RTS__ BSC__Shower Chair__  02__ HHN__ CPAP__  BiPap__  Hospital Bed__ W/C___ Other__________    Services in the home and/or outpatient, prior to admission: None     PT/OT recs: SNF     Hospital Exemption Notification (HEN): Needed for SNF     Barriers to discharge: None     Plan/comments: Pt states that he is not interested in SNF placement at [place]. He is agreeable to [facility] with no agency preference. [facility] [place]. Pt is still requiring 11L/HFNC with no home requirement. Sophie Lopez aware and following. CM will continue to follow and assist as able.     Bri Reyes RN       [facility] on chart for MD cobian

## 2021-10-13 NOTE — CARE COORDINATION
CM spoke with pt via room phone; he states that he has two therapists in his room and requests that CM call him back for assessment. Will call back time permitting.     Marcia Goldberg, RN

## 2021-10-13 NOTE — PROGRESS NOTES
Pt ambulated to BR with 9LHF, when back to bed, 02 sats 80% increased to 11L, 02 sat 91%. Dr Tracy Mancini aware. Will get BSC. All safety measures inplace.

## 2021-10-13 NOTE — PROGRESS NOTES
Infectious Disease Follow up Notes    CC :  Severe COVID in immunocompromised host      Antibiotics:   None     Solumedrol 40 q12, day #4/10    Admit Date:   10/10/2021  Hospital Day: 4    Subjective:   He remains afebrile   Increased oxygen requirement, currently on 13L HFNC     Objective:     Patient Vitals for the past 8 hrs:   BP Temp Temp src Pulse Resp SpO2   10/13/21 1227    73  91 %   10/13/21 1224 129/74 97.8 °F (36.6 °C) Oral 75 22 90 %   10/13/21 0850    65  97 %   10/13/21 0838      (!) 89 %   10/13/21 0833 122/71 97.9 °F (36.6 °C) Oral 95 22 (!) 80 %       EXAM:  General:  Alert, oriented, appears comfortable            HEENT:  NCAT, PERRL, sclera anicteric  MMM  NECK:  supple,symmetrical                                          LUNGS:   Non-labored breathing                                 SKIN:   No focal rash                                                                   LINE:   IV site ok           Scheduled Meds:   insulin glargine  20 Units SubCUTAneous Nightly    aspirin  81 mg Oral Daily    escitalopram  10 mg Oral Daily    rosuvastatin  40 mg Oral Daily    tamsulosin  0.4 mg Oral Daily    sodium chloride flush  5-40 mL IntraVENous 2 times per day    heparin (porcine)  5,000 Units SubCUTAneous 3 times per day    methylPREDNISolone  40 mg IntraVENous Q12H    Vitamin D  2,000 Units Oral Daily    insulin lispro  0-12 Units SubCUTAneous TID WC    insulin lispro  0-6 Units SubCUTAneous Nightly       Continuous Infusions:   sodium chloride      dextrose            Data Review:    Lab Results   Component Value Date    WBC 38.7 (H) 10/11/2021    HGB 12.0 (L) 10/11/2021    HCT 36.6 (L) 10/11/2021    MCV 86.8 10/11/2021    PLT 94 (L) 10/11/2021     Lab Results   Component Value Date    CREATININE 2.6 (H) 10/11/2021    BUN 48 (H) 10/11/2021     10/11/2021    K 4.1 10/11/2021     10/11/2021    CO2 21 10/11/2021       Hepatic Function Panel:   Lab Results   Component Value Date    ALKPHOS 51 10/10/2021    ALT 44 10/10/2021    AST 99 10/10/2021    PROT 6.5 10/10/2021    PROT 6.6 09/05/2012    BILITOT 1.1 10/10/2021    BILIDIR <0.2 07/05/2017    IBILI see below 07/05/2017    LABALBU 3.8 10/10/2021         MICRO:  10/10/21 COVID NAAT+       IMAGING:  CXR 10/10/21  Impression   No acute cardiopulmonary disease       Stable interstitial opacities, likely fibrotic.        Assessment:     Patient Active Problem List    Diagnosis Date Noted    COVID-19 10/10/2021    Renal calculi 06/01/2021    Chronic kidney disease (CKD), stage IV (severe) ([address] 09/22/2020    Chronic lymphocytic leukemia ([address] 01/15/2020    Essential tremor 11/14/2019    DM (diabetes mellitus), secondary, uncontrolled, w/neurologic complic ([address] 47/89/5387    HTN (hypertension), benign 11/14/2019    Dyslipidemia 11/14/2019    Lymphocytosis 07/01/2019    Chronic kidney disease, stage III (moderate) ([address] 07/01/2019    Right wrist pain 06/11/2019    Abrasion of right forearm 06/11/2019    Abrasion of left forearm 06/11/2019    Type 2 diabetes mellitus with hyperglycemia ([address] 12/21/2017    Bradycardia 07/08/2014    Diabetes mellitus ([address] 10/03/2012    Murmur, cardiac 10/03/2012    Thoracic compression fracture ([address] 10/03/2012     Overview Note:     S/p kyphplasty T 10, T12      MVP (mitral valve prolapse) 10/02/2012    Hyperlipemia 10/02/2012    BPH (benign prostatic hyperplasia) 10/02/2012    IGT (impaired glucose tolerance) 56/52/2950    Dysmetabolic syndrome X 38/34/2588    Benign essential tremor 10/02/2012    HTN (hypertension) 10/02/2012    Hematuria 10/02/2012    Renal insufficiency 10/02/2012     Overview Note:     Released by Nephrology.  Recommend bmp and urine studies q 6 months         COVID-19 and acute hypoxemic respiratory failure   Fully vaccinated x2 doses, immunocompromised host with hypogammaglobulinemia failed to mount adequate response to vaccines   Date of symptom onset ~10/4/21  Date of diagnosis and admission 10/10/21  Moderate elevation CRP at 58   Worsening hypoxemia despite steroid   High risk of morbidity and mortality given age and comorbidities   -considered adding baricitanib. In Cov-Barrier trial, GFR <30 was an exclusion criteria. Could consider giving toci though CRP has been <75 which was criteria used in Recovery trial.  There is limited data on impact of these therapies in similarly immunosuppressed patients.   Awaiting repeat labs      -continue steroid   -DVT ppx  -outside window of benefit from mab, antiviral therapy   -isolation for 20d from symptom onset      CLL     CKD at baseline      NKDA        Repeat CBC, BMP, CRP pending      Discussed with patient/family, all questions answered        Basim Aguero MD  Phone: [phone]   Fax : [phone]

## 2021-10-13 NOTE — PROGRESS NOTES
Physical Therapy  Facility/Department: [facility] C5 - MED SURG/ORTHO  Daily Treatment Note  NAME: Butler Dancer  : 1935  MRN: [id number]    Date of Service: 10/13/2021    Discharge Recommendations:  [address], Continue to assess pending progress   PT Equipment Recommendations  Equipment Needed: No    Assessment   Body structures, Functions, Activity limitations: Decreased functional mobility ; Decreased balance;Decreased strength;Decreased endurance  Assessment: Pt pleasant and agreeable to PT session this date. Pt requiring increased O2 this date on high flow nasal cannula, due to parkinson's pt gets better SpO2 reading with ear probe. Pt continues to require extended rest breaks to recover during all functional mobility and therapeutic exercises. Overall, pt requires CGA-min(A) for mobility. Pt would benefit from continued skilled PT to address endurance deficits. Continue to assess for d/c needs pending O2 requirements. Pt may benefit from SNF upon d/c to address endurance deficits. Treatment Diagnosis: impaired endurance  Prognosis: Good  Decision Making: Medium Complexity  PT Education: Goals; General Safety;PT Role;Disease Specific Education;Plan of Care; Functional Mobility Training;Transfer Training  Patient Education: Pt educated on PLB with exercises and activity, use of cane to improve balance and continued of change of position- verbalizes understanding  REQUIRES PT FOLLOW UP: Yes  Activity Tolerance  Activity Tolerance: Patient Tolerated treatment well;Patient limited by endurance  Activity Tolerance: Pt frequently desats to 79-82% on 11 L requring 2-3 minutes to recover to >88%. At end of sessin, pt 90-92% on 11 L, RN notified     Patient Diagnosis(es): The primary encounter diagnosis was Generalized weakness.  Diagnoses of COVID-19, LEONOR (acute kidney injury) ([facility] Hyponatremia, Leukocytosis, unspecified type, Elevated LFTs, Thrombocytopenia ([address] Hypoxia, and Elevated d-dimer were also pertinent to this visit. has a past medical history of Back pain, Cancer ([facility] Chronic kidney disease, COVID-19, Diabetes mellitus ([facility] [address] Essential tremor, History of BPH, Hypercholesteremia, Renal insufficiency, and Type 2 diabetes mellitus with hyperglycemia ([facility] [address] has a past surgical history that includes Cataract removal; Cystocopy; Kyphosis surgery (8/24/2012); other surgical history (Left, 06/01/2021); Kidney stone removal (Left, 6/1/2021); Nephrostomy (Left, 6/1/2021); Nephrostomy (Left, 6/1/2021); and hernia repair (0789). Restrictions  Restrictions/Precautions  Restrictions/Precautions: General Precautions, Isolation, Fall Risk  Position Activity Restriction  Other position/activity restrictions: 9-11 L O2, telemetry, Up with Assist     Subjective   General  Chart Reviewed: Yes  Additional Pertinent Hx: PMH: Parkinson's Disease  Response To Previous Treatment: Patient with no complaints from previous session.   Family / Caregiver Present: No  Referring Practitioner: Roxanna Elaine MD  Subjective  Subjective: Pt supine in bed upon arrival, agreeable to PT tx  General Comment  Comments: RN cleared pt for therapy tx. states pt is on increased o2 requirement, compared to previous day  Pain Screening  Patient Currently in Pain: Denies  Vital Signs  Patient Currently in Pain: Denies     Objective   Bed mobility  Supine to Sit: Stand by assistance  Sit to Supine: Unable to assess (Pt in chair at end of session)  Scooting: Stand by assistance  Comment: Increased time to complete, decreased Spo2 with minimal activity requiring extended rest breaks in order to improve to >90% on 11 L     Transfers  Sit to Stand: Contact guard assistance  Stand to sit: Contact guard assistance  Comment: no device, SPT from EOB <> BSC     Ambulation  Ambulation?: Yes  Ambulation 1  Surface: level tile  Device: Single point cane  Other Apparatus: O2 (11L)  Assistance: Minimal assistance  Quality of Gait: Minutes     If pt is unable to be seen after this session, please let this note serve as discharge summary. Please see case management note for discharge disposition. Thank you.     Eve Lr, PT

## 2021-10-13 NOTE — PROGRESS NOTES
Occupational Therapy  Facility/Department: [facility] C5 - MED SURG/ORTHO  Daily Treatment Note  NAME: Adeola Watson  : 1935  MRN: [id number]    Date of Service: 10/13/2021    Discharge Recommendations:  Subacute/Skilled Nursing Facility     Assessment   Performance deficits / Impairments: Decreased functional mobility ; Decreased safe awareness;Decreased balance;Decreased coordination;Decreased ADL status; Decreased endurance;Decreased high-level IADLs  Assessment: Pt PLOF I, pt very limited by endurance and O2 sats dropping with minimal activity on increased O2 demand this date, 11L high flow. Pt lives alone, functioning below his baseline with the above noted occupational performance deficits and would benefit from continued skilled OT in SNF setting at d/c. Prognosis: Good  OT Education: OT Role;Plan of Care;Energy Conservation;Transfer Training;ADL Adaptive Strategies  Disease Specific Education: Pt educated on importance of OOB mobility, prevention of complications of bedrest, and general safety during hospitalization. Pt verbalized understanding. Barriers to Learning: none perceived at this time  REQUIRES OT FOLLOW UP: Yes  Activity Tolerance  Activity Tolerance: Treatment limited secondary to medical complications (free text)  Activity Tolerance: BP= 132/68, HR= 85, SPO2= pt on 9L at start of session, with transfer to [facility] dropped to low 80s, with RN approval pt O2 increased to 11L, pt sats as low as 77% with LE dressing, pt requires 2-3 mins seated recover with cues for PLB, dropping often with minimal activity into low 80s, does recover to 88-90% with extended rest breaks and PLB  Safety Devices  Safety Devices in place: Yes  Type of devices: Left in chair;Chair alarm in place;Call light within reach;Nurse notified;Gait belt         Patient Diagnosis(es): The primary encounter diagnosis was Generalized weakness.  Diagnoses of COVID-19, LEONOR (acute kidney injury) ([facility] [address] Hyponatremia, Leukocytosis, unspecified type, Elevated LFTs, Thrombocytopenia ([address] Hypoxia, and Elevated d-dimer were also pertinent to this visit. has a past medical history of Back pain, Cancer ([address] Chronic kidney disease, COVID-19, Diabetes mellitus ([facility] [address] Essential tremor, History of BPH, Hypercholesteremia, Renal insufficiency, and Type 2 diabetes mellitus with hyperglycemia ([facility] [address] has a past surgical history that includes Cataract removal; Cystocopy; Kyphosis surgery (8/24/2012); other surgical history (Left, 06/01/2021); Kidney stone removal (Left, 6/1/2021); Nephrostomy (Left, 6/1/2021); Nephrostomy (Left, 6/1/2021); and hernia repair (0962). Restrictions  Restrictions/Precautions  Restrictions/Precautions: General Precautions, Isolation, Fall Risk  Position Activity Restriction  Other position/activity restrictions: 9-11 L O2, telemetry, Up with Assist     Subjective   General  Chart Reviewed: Yes  Patient assessed for rehabilitation services?: Yes  Response to previous treatment: Patient with no complaints from previous session  Family / Caregiver Present: No  Referring Practitioner: Dr. Nhi Meyers  Diagnosis: CoVID, multiple falls    Subjective  Subjective: Pt resting in bed, requesting to use the bathroom and agreeable to OT treatment. Vital Signs  Patient Currently in Pain: Denies     Orientation  Orientation  Overall Orientation Status: Within Functional Limits     Objective    ADL  LE Dressing: Stand by assistance; Increased time to complete  Toileting: Contact guard assistance     Balance  Sitting Balance: Supervision  Standing Balance: Contact guard assistance (with SPC)  Standing Balance  Activity: bed to BSC, BSC around foot of bed to bedside chair    Functional Mobility  Functional - Mobility Device: Cane  Activity: Other  Assist Level: Contact guard assistance    Toilet Transfers  Toilet - Technique: Stand step  Equipment Used: Standard bedside commode  Toilet Transfer: Contact guard assistance    Bed mobility  Supine to Sit: Stand by assistance (to L with HOB elevated)     Transfers  Sit to stand: Contact guard assistance  Stand to sit: Contact guard assistance     Cognition  Insights: Decreased awareness of deficits     Type of ROM/Therapeutic Exercise  Type of ROM/Therapeutic Exercise: AROM  Comment: seated in chair  Exercises  Shoulder Flexion: 10x     Plan   Plan  Times per week: 2-3x/ week  Current Treatment Recommendations: Endurance Training, ROM, Balance Training, Functional Mobility Training, Safety Education & Training, Positioning, Self-Care / ADL    AM-PAC Score  AM-[facility] Inpatient Daily Activity Raw Score: 18 (10/13/21 1438)  AM-PAC Inpatient ADL T-Scale Score : 38.66 (10/13/21 1438)  ADL Inpatient CMS 0-100% Score: 46.65 (10/13/21 1438)  ADL Inpatient CMS G-Code Modifier : CK (10/13/21 1438)    Goals  Short term goals  Time Frame for Short term goals: 2 weeks(10-25-21)  Short term goal 1: supervision with bathroom mobility with LRAD (Least Restrictive Assistive Device) by 10-25-21-- ongoing 10/13/21  Short term goal 2: supervision with standing ADL's for 5 minutes by 10-25-21-- ongoing 10/13/21  Short term goal 3:  Independent with LE self care by 10-20-21-- ongoing 10/13/21  Short term goal 4: tolerate 15 reps BUE AROM exercises-- ongoing 10/13/21  Short term goal 5: supervision with functional/toilet transfers-- ongoing 10/13/21  Patient Goals   Patient goals : get stronger to go home       Therapy Time   Individual Concurrent Group Co-treatment   Time In 1330         Time Out 1410         Minutes [address], DELUNA/L

## 2021-10-13 NOTE — PROGRESS NOTES
Pt is doing pretty good, states he feels OK. He is steady while walking to the bathroom with no assist.  02 hangs around 92% on 4L NC. 02 sensor works best on the forehead for this pt d/t shaky hands.

## 2021-10-13 NOTE — PROGRESS NOTES
clear.  Neck: Supple, with full range of motion. No jugular venous distention. Trachea midline. Respiratory:  improved respiratory effort. Clear to auscultation, bilaterally without Wheezes/Rhonchi. Fine rales noted  Cardiovascular: Regular rate and rhythm with normal S1/S2 without murmurs, rubs or gallops. Abdomen: Soft, non-tender, non-distended with normal bowel sounds. Musculoskeletal: Generalized weakness. Skin: Skin color, texture, turgor normal.  No rashes or lesions. Neurologic:  Neurovascularly intact without any focal sensory/motor deficits. Cranial nerves: II-XII intact, grossly non-focal.  Psychiatric: Alert and oriented, thought content appropriate, normal insight  Capillary Refill: Brisk,3 seconds, normal   Peripheral Pulses: +2 palpable, equal bilaterally       Labs:   Recent Labs     10/10/21  1009 10/11/21  0705   WBC 28.2* 38.7*   HGB 13.0* 12.0*   HCT 39.8* 36.6*   PLT 93* 94*     Recent Labs     10/10/21  1009 10/11/21  0705   * 136   K 4.1 4.1   CL 97* 102   CO2 22 21   BUN 43* 48*   CREATININE 2.7* 2.6*   CALCIUM 9.1 8.9     Recent Labs     10/10/21  1009   AST 99*   ALT 44*   BILITOT 1.1*   ALKPHOS 51     No results for input(s): INR in the last 72 hours. Recent Labs     10/10/21  1009   TROPONINI 0.01       Urinalysis:      Lab Results   Component Value Date    NITRU Negative 10/10/2021    WBCUA 0-2 10/10/2021    BACTERIA 2+ 01/02/2020    RBCUA None seen 10/10/2021    BLOODU LARGE 10/10/2021    SPECGRAV 1.025 10/10/2021    GLUCOSEU Negative 10/10/2021       Radiology:  XR THORACIC SPINE (MIN 4 VIEWS)   Final Result   Multiple old compression fractures status post vertebral body enhancement T10   and T12      No acute fracture definitely identified         XR CHEST PORTABLE   Final Result   No acute cardiopulmonary disease      Stable interstitial opacities, likely fibrotic.                  Assessment/Plan:    Active Hospital Problems    Diagnosis     COVID-19 [U07.1] Acute hypoxic resp failure- due to covid-19  Date of symptom onset - September 27   Date of diagnosis - 10/4/21  Date of admission - 10/10/21  Vaccinated - both doses of Moderna approx 6 months ago  CXR - fibrotic changes  Troponin and EKG -<0.01, sinus, RBBB, PVCs  Supplemental oxygen : -  currently on 1-2LPM to maintain sats >94%, wean as tolerated  Labs : ferritin, daily D-dimer, CRP for prognostication  Steroids : IV Solu-Medrol 40 mg twice daily   Antiviral : IV remdesivir - unable to use as with CKD  IV antibiotics : hold, low suspicion of concomitant bacterial pneumonia but will start if needed, follow fever curve as WBC not helpful in setting of chronic lymphocytosis  Anticoagulation : heparin  IV fluids :  Avoided overzealous fluids  Intubation/MV : discussed with patient, patient agreeable if needed  Consider Tocilizumab if CRP >75   Continue excellent supportive care, QD family updates, droplet plus isolation   -ID consulted, apprec recs, pt outside window for mab/antiviral    Questionable Lung cancer - recent diagnosis per pt. Started work-up as outpt? No records that were found  Oncology consulted, recent CT chest noted fibrosis and nodules(improved in size per note)     Lymphocytosis - chronically elevated WBCs. Monitor CBC. Oncology consulted(noted hx of CLL, no need for tx now, obs only)  -IgG sent by onc        HLD - continue statin - monitor LFTs. Stable.     DM2- SSI and lantus. Monitor BS as will be elevated due to steroids and illness. A1c 7.5 July, 2021.      CKD - stage 4 - BL 2-2.5 BL. Currently 2.6. Monitor BMP.      Renal Osteodystrophy - continue Vit D - 2000IU daily.     DVT Prophylaxis: heparin  Diet: ADULT DIET;  Regular  Code Status: Limited    PT/OT Eval Status: ordered and rec snf   as of 10/13     Dispo - unclear, pending improved oxygenation    Astrid Ortega MD

## 2021-10-14 NOTE — PROGRESS NOTES
Physical Therapy  Facility/Department: [facility] C5 - MED SURG/ORTHO  Daily Treatment Note  NAME: Joshua Tang  : 1935  MRN: [id number]    Date of Service: 10/14/2021    Discharge Recommendations:  [address], Continue to assess pending progress   PT Equipment Recommendations  Equipment Needed: No    Assessment   Body structures, Functions, Activity limitations: Decreased functional mobility ; Decreased balance;Decreased strength;Decreased endurance  Assessment: Pt making small gains with endurance during activity. Decreased O2 to 6L and increased ambulation distance althgouh needs extended sitting rest breaks. Recommend SNF as pt lives alone and does not have functional endurance or balance to safely manage at home. Treatment Diagnosis: impaired endurance  Prognosis: Good  Decision Making: Medium Complexity  PT Education: Goals; General Safety;PT Role;Disease Specific Education;Plan of Care; Functional Mobility Training;Transfer Training  Patient Education: Pt educated on PLB with exercises and activity, use of cane to improve balance and continued of change of position- verbalizes understanding  Barriers to Learning: none  REQUIRES PT FOLLOW UP: Yes  Activity Tolerance  Activity Tolerance: Patient Tolerated treatment well;Patient limited by endurance  Activity Tolerance: Pt frequently desats to 80% on 6 L requring 2-3 minutes to recover to >88%. At end of sessin, pt 90-92% on 6 L, RN notified     Patient Diagnosis(es): The primary encounter diagnosis was Generalized weakness. Diagnoses of COVID-19, LEONOR (acute kidney injury) ([address] Hyponatremia, Leukocytosis, unspecified type, Elevated LFTs, Thrombocytopenia ([address] Hypoxia, and Elevated d-dimer were also pertinent to this visit.      has a past medical history of Back pain, Cancer ([address] Chronic kidney disease, COVID-19, Diabetes mellitus ([address] Essential tremor, History of BPH, Hypercholesteremia, Renal insufficiency, and Type 2 diabetes mellitus with hyperglycemia ([facility] [address] has a past surgical history that includes Cataract removal; Cystocopy; Kyphosis surgery (8/24/2012); other surgical history (Left, 06/01/2021); Kidney stone removal (Left, 6/1/2021); Nephrostomy (Left, 6/1/2021); Nephrostomy (Left, 6/1/2021); and hernia repair (0880). Restrictions  Restrictions/Precautions  Restrictions/Precautions: General Precautions, Isolation, Fall Risk  Position Activity Restriction  Other position/activity restrictions: 8 L O2, telemetry, Up with Assist  Subjective   General  Chart Reviewed: Yes  Additional Pertinent Hx: PMH: Parkinson's Disease  Response To Previous Treatment: Patient with no complaints from previous session.   Family / Caregiver Present: No  Referring Practitioner: Gabbi Gastelum MD  Subjective  Subjective: Pt supine in bed upon arrival, agreeable to PT tx  Pain Screening  Patient Currently in Pain: Denies  Vital Signs  Pulse: 91  BP: 129/65  Patient Currently in Pain: Denies  Oxygen Therapy  SpO2: 100 %       Orientation  Orientation  Overall Orientation Status: Within Normal Limits  Cognition      Objective   Bed mobility  Supine to Sit: Stand by assistance  Sit to Supine: Unable to assess  Transfers  Sit to Stand: Contact guard assistance  Stand to sit: Contact guard assistance  Ambulation  Ambulation?: Yes  Ambulation 1  Surface: level tile  Device: Single point cane  Other Apparatus: O2 (6L)  Assistance: Minimal assistance;Contact guard assistance  Quality of Gait: moderately unsteady, increased LILIANA, slow sujit, decreased step length B  Distance: 20 ft x 2  Stairs/Curb  Stairs?: No     Balance  Posture: Fair  Sitting - Static: Good  Sitting - Dynamic: Good  Standing - Static: Fair  Standing - Dynamic: Fair;-  Exercises  Gluteal Sets: Bx10  Hip Flexion: x20 B  Knee Long Arc Quad: x20 B  Ankle Pumps: x20 B            AM-PAC Score  AM-PAC Inpatient Mobility Raw Score : 17 (10/14/21 1202)  AM-PAC Inpatient T-Scale Score : 42.13 (10/14/21 1202)  Mobility Inpatient CMS 0-100% Score: 50.57 (10/14/21 1202)  Mobility Inpatient CMS G-Code Modifier : CK (10/14/21 1202)          Goals  Short term goals  Time Frame for Short term goals: 7 days ( 10/18/2021)  Short term goal 1: Pt will perform bed mobility independently--10/14 SBA  Short term goal 2: Pt will perform SPT and STS with cane and supervision--10/14 CGA  Short term goal 3: Pt will ambulate 50 ft with supervision and LRAD--10/14 min(A) up to 20 ft  Short term goal 4: Pt will perform 12-15 reps of BLE exercises to demo understanding of HEP--10/14- MET, on going  Patient Goals   Patient goals : \"to be independent\"    Plan    Plan  Times per week: 3-5x/week  Current Treatment Recommendations: Strengthening, Neuromuscular Re-education, Home Exercise Program, Safety Education & Training, Patient/Caregiver Education & Training, Endurance Training, Balance Training, Functional Mobility Training, Transfer Training, Gait Training, Stair training  Safety Devices  Type of devices:  All fall risk precautions in place, Nurse notified, Call light within reach, Gait belt, Patient at risk for falls, Left in chair, Chair alarm in place  Restraints  Initially in place: No     Therapy Time   Individual Concurrent Group Co-treatment   Time In 1105         Time Out 1133         Minutes nAita 2, [address] [facility]

## 2021-10-14 NOTE — PROGRESS NOTES
Infectious Disease Follow up Notes    CC :  Severe COVID in immunocompromised host      Antibiotics:   None     Solumedrol 40 q12, day #5/10    Admit Date:   10/10/2021  Hospital Day: 5    Subjective:   He remains afebrile, says he is feeling better  Still with high oxygen requirements but weaned a bit from 13L to 9L HFNC     SpO2 still significantly discrepant forehead (95%0 vs fingers (82%)    Objective:     Patient Vitals for the past 8 hrs:   BP Temp Temp src Pulse Resp SpO2   10/14/21 0345    71  90 %   10/14/21 0342 (!) 117/52 98.4 °F (36.9 °C) Oral 87 18 90 %       EXAM:  General:  Alert, oriented, appears comfortable    HEENT:  NCAT, PERRL, sclera anicteric  MMM  NECK:  supple,symmetrical       LUNGS:   Non-labored breathing     SKIN: No focal rash           LINE: IV site ok         Scheduled Meds:   insulin lispro  0-18 Units SubCUTAneous TID WC    insulin lispro  0-9 Units SubCUTAneous Nightly    insulin glargine  20 Units SubCUTAneous Nightly    aspirin  81 mg Oral Daily    escitalopram  10 mg Oral Daily    rosuvastatin  40 mg Oral Daily    tamsulosin  0.4 mg Oral Daily    sodium chloride flush  5-40 mL IntraVENous 2 times per day    heparin (porcine)  5,000 Units SubCUTAneous 3 times per day    methylPREDNISolone  40 mg IntraVENous Q12H    Vitamin D  2,000 Units Oral Daily       Continuous Infusions:   sodium chloride      dextrose            Data Review:    Lab Results   Component Value Date    WBC 37.0 (H) 10/13/2021    HGB 12.4 (L) 10/13/2021    HCT 37.4 (L) 10/13/2021    MCV 86.2 10/13/2021     (L) 10/13/2021     Lab Results   Component Value Date    CREATININE 2.4 (H) 10/13/2021    BUN 75 (H) 10/13/2021     10/13/2021    K 4.6 10/13/2021     10/13/2021    CO2 23 10/13/2021       Hepatic Function Panel:   Lab Results   Component Value Date    ALKPHOS 51 10/10/2021    ALT 44 10/10/2021 AST 99 10/10/2021    PROT 6.5 10/10/2021    PROT 6.6 09/05/2012    BILITOT 1.1 10/10/2021    BILIDIR <0.2 07/05/2017    IBILI see below 07/05/2017    LABALBU 3.8 10/10/2021         MICRO:  10/10 COVID NAAT+      IMAGING:  CXR 10/10/21  Impression   No acute cardiopulmonary disease       Stable interstitial opacities, likely fibrotic. Assessment:     Patient Active Problem List    Diagnosis Date Noted    COVID-19 10/10/2021    Renal calculi 06/01/2021    Chronic kidney disease (CKD), stage IV (severe) ([address] 09/22/2020    Chronic lymphocytic leukemia ([address] 01/15/2020    Essential tremor 11/14/2019    DM (diabetes mellitus), secondary, uncontrolled, w/neurologic complic ([address] 47/91/8831    HTN (hypertension), benign 11/14/2019    Dyslipidemia 11/14/2019    Lymphocytosis 07/01/2019    Chronic kidney disease, stage III (moderate) ([address] 07/01/2019    Right wrist pain 06/11/2019    Abrasion of right forearm 06/11/2019    Abrasion of left forearm 06/11/2019    Type 2 diabetes mellitus with hyperglycemia ([address] 12/21/2017    Bradycardia 07/08/2014    Diabetes mellitus ([address] 10/03/2012    Murmur, cardiac 10/03/2012    Thoracic compression fracture ([address] 10/03/2012     Overview Note:     S/p kyphplasty T 10, T12      MVP (mitral valve prolapse) 10/02/2012    Hyperlipemia 10/02/2012    BPH (benign prostatic hyperplasia) 10/02/2012    IGT (impaired glucose tolerance) 94/47/1281    Dysmetabolic syndrome X 01/48/9186    Benign essential tremor 10/02/2012    HTN (hypertension) 10/02/2012    Hematuria 10/02/2012    Renal insufficiency 10/02/2012     Overview Note:     Released by Nephrology.  Recommend bmp and urine studies q 6 months         COVID-19 and acute hypoxemic respiratory failure   Fully vaccinated x2 doses, immunocompromised host with hypogammaglobulinemia failed to mount adequate response to vaccines   Date of symptom onset ~10/4/21  Date of diagnosis and admission 10/10/21  Moderate elevation CRP, now declining  Persistent hypoxemia despite steroid - improved   High risk of morbidity and mortality given age and comorbidities   -continue steroid, today is day 5/10  -given interval improvement and decline in CRP, I would not add toci or baricitinib at this point. If clinical picture worsens, we can revisit   -DVT ppx  -outside window of benefit from mab, antiviral therapy   -isolation for 20d from symptom onset      CLL     CKD at baseline   GFR ~21     NKDA          Discussed with patient/family, all questions answered  D/w Hospitalist     I will be out of town.   Please reach out via PS with ID related concerns       Balaji Sheehan MD  Phone: [phone]   Fax : [phone]

## 2021-10-14 NOTE — PROGRESS NOTES
Hospitalist Progress Note      PCP: Elizabeth Penyn MD    Date of Admission: 10/10/2021    Chief Ruma Monae 37 y. o. male who presented to [facility] with falls and SOB. Notes he lives alone in an assisted living apartment. States he was recently diagnosed with lung cancer. Is in the process of that work-up. Was diagnosed with COVID. He was vaccinated with both doses. He was home and became weak. He fell multiple times. Came to the ED and found to be hypoxic. Started on oxygen. He does not wear oxygen at baseline.        Subjective: denies signif sob, still on 9L , pulse ox placed on forehead, pt is also mouthbreather      Medications:  Reviewed    Infusion Medications    sodium chloride      dextrose       Scheduled Medications    insulin lispro  0-18 Units SubCUTAneous TID WC    insulin lispro  0-9 Units SubCUTAneous Nightly    insulin glargine  20 Units SubCUTAneous Nightly    aspirin  81 mg Oral Daily    escitalopram  10 mg Oral Daily    rosuvastatin  40 mg Oral Daily    tamsulosin  0.4 mg Oral Daily    sodium chloride flush  5-40 mL IntraVENous 2 times per day    heparin (porcine)  5,000 Units SubCUTAneous 3 times per day    methylPREDNISolone  40 mg IntraVENous Q12H    Vitamin D  2,000 Units Oral Daily     PRN Meds: prochlorperazine, sodium chloride flush, sodium chloride, polyethylene glycol, acetaminophen **OR** acetaminophen, glucose, dextrose, glucagon (rDNA), dextrose      Intake/Output Summary (Last 24 hours) at 10/14/2021 1318  Last data filed at 10/14/2021 0930  Gross per 24 hour   Intake 867 ml   Output    Net 867 ml       Physical Exam Performed:    /65   Pulse 91   Temp 98.4 °F (36.9 °C) (Oral)   Resp 20   Ht 5' 8\" (1.727 m)   Wt 140 lb (63.5 kg)   SpO2 100%   BMI 21.29 kg/m²     General appearance: Frail, elderly. HEENT: Pupils equal, round, and reactive to light. Conjunctivae/corneas clear. Neck: Supple, with full range of motion. No jugular venous distention. Trachea midline. Respiratory:  improved respiratory effort. Clear to auscultation, bilaterally without Wheezes/Rhonchi. Fine rales noted  Cardiovascular: Regular rate and rhythm with normal S1/S2 without murmurs, rubs or gallops. Abdomen: Soft, non-tender, non-distended with normal bowel sounds. Musculoskeletal: Generalized weakness. Skin: Skin color, texture, turgor normal.  No rashes or lesions. Neurologic:  Neurovascularly intact without any focal sensory/motor deficits. Cranial nerves: II-XII intact, grossly non-focal.  Psychiatric: Alert and oriented, thought content appropriate, normal insight  Capillary Refill: Brisk,3 seconds, normal   Peripheral Pulses: +2 palpable, equal bilaterally          Labs:   Recent Labs     10/13/21  1327   WBC 37.0*   HGB 12.4*   HCT 37.4*   *     Recent Labs     10/13/21  1328      K 4.6      CO2 23   BUN 75*   CREATININE 2.4*   CALCIUM 9.3     No results for input(s): AST, ALT, BILIDIR, BILITOT, ALKPHOS in the last 72 hours. No results for input(s): INR in the last 72 hours. No results for input(s): Wayna Khat in the last 72 hours. Urinalysis:      Lab Results   Component Value Date    NITRU Negative 10/10/2021    WBCUA 0-2 10/10/2021    BACTERIA 2+ 01/02/2020    RBCUA None seen 10/10/2021    BLOODU LARGE 10/10/2021    SPECGRAV 1.025 10/10/2021    GLUCOSEU Negative 10/10/2021       Radiology:  XR THORACIC SPINE (MIN 4 VIEWS)   Final Result   Multiple old compression fractures status post vertebral body enhancement T10   and T12      No acute fracture definitely identified         XR CHEST PORTABLE   Final Result   No acute cardiopulmonary disease      Stable interstitial opacities, likely fibrotic.                  Assessment/Plan:    Active Hospital Problems    Diagnosis     COVID-19 [U07.1]      Acute hypoxic resp failure- due to covid-19  Date of symptom onset - September 27   Date of diagnosis - 10/4/21  Date of admission - 10/10/21  Vaccinated - both doses of Moderna approx 6 months ago  CXR - fibrotic changes  Troponin and EKG -<0.01, sinus, RBBB, PVCs  Supplemental oxygen : -  currently on 1-2LPM to maintain sats >94%, wean as tolerated  Labs : ferritin, daily D-dimer, CRP for prognostication  Steroids : IV Solu-Medrol 40 mg twice daily   Antiviral : IV remdesivir - unable to use as with CKD  IV antibiotics : hold, low suspicion of concomitant bacterial pneumonia but will start if needed, follow fever curve as WBC not helpful in setting of chronic lymphocytosis  Anticoagulation : heparin  IV fluids :  Avoided overzealous fluids  Intubation/MV : discussed with patient, patient agreeable if needed  Consider Tocilizumab if CRP >75   Continue excellent supportive care, QD family updates, droplet plus isolation   -ID consulted, apprec recs, pt outside window for mab/antiviral     Questionable Lung cancer - recent diagnosis per pt. Started work-up as outpt? No records that were found  Oncology consulted, recent CT chest noted fibrosis and nodules(improved in size per note)     Lymphocytosis - chronically elevated WBCs. Monitor CBC. Oncology consulted(noted hx of CLL, no need for tx now, obs only)  -IgG sent by onc        HLD - continue statin - monitor LFTs. Stable.     DM2- SSI and lantus. Monitor BS as will be elevated due to steroids and illness. A1c 7.5 July, 2021.      CKD - stage 4 - BL 2-2.5 BL. Currently 2.6. Monitor BMP.      Renal Osteodystrophy - continue Vit D - 2000IU daily. DVT Prophylaxis: heparin  Diet: ADULT DIET;  Regular  Code Status: Limited(discussed 10/14 with son Jordy Lerma on phone)      PT/OT Eval Status: ordered and rec snf   as of 10/13     Dispo - unclear, code status adjusted, guarded prognosis, improved oxygenation    Comfort Bullock MD

## 2021-10-14 NOTE — PLAN OF CARE
Problem: Falls - Risk of:  Goal: Will remain free from falls  Description: Will remain free from falls  10/14/2021 0249 by Shreya Cardona RN  Outcome: Ongoing  10/13/2021 1837 by Angelica Moore RN  Outcome: Ongoing  Goal: Absence of physical injury  Description: Absence of physical injury  10/14/2021 0249 by Shreya Cardona RN  Outcome: Ongoing  10/13/2021 1837 by Angelica Moore RN  Outcome: Ongoing

## 2021-10-14 NOTE — ACP (ADVANCE CARE PLANNING)
ADVANCED CARE PLANNING    Name:Henry Heinz Curling       :  1935              MRN:  [id number]  Admission Date: 10/10/2021  9:53 AM  Date of Discussion:  10/14/21      Purpose of Encounter: Advanced care planning in light of covid, acute resp failure. Parties in attendance: :Jayme Lynn MD, Family members: son Kenneth Galaviz on the phone. Decisional Capacity:Yes      Objective/Medical Story: 81 y. o. male with hx of CLL who presented to [facility] with falls and SOB. Notes he lives alone in an assisted living apartment. States he was recently diagnosed with lung cancer. Is in the process of that work-up. Was diagnosed with COVID. He was vaccinated with both doses. He was home and became weak. He fell multiple times. Came to the ED and found to be hypoxic. Started on oxygen. He does not wear oxygen at baseline. Pt has had inc'g oxygen requirements during stay, prompting ACP discussion. Active Diagnoses: Active Hospital Problems    Diagnosis Date Noted    COVID-19 [U07.1] 10/10/2021       These active diagnoses are of sufficient risk that focused discussion on advance care planning is indicated in order to allow the patient to thoughtfully consider personal goals of care; and if situations arise that prevent the ability to personally give input, to ensure appropriate representation of their personal desires for different levels and agressiveness of care. Goals of Care Determinations: Patient wishes to focus on aggressive but not heroic care. .   Code Status: At this time patient wishes to be LIMITED(ok to do everything up till cardiac arrest). Time Spent on Advanced Planning Documents: 16 mins. The following items were considered in medical decision making:  Independent review of images , Review / order clinical lab tests. Review / order radiology tests, Decision to obtain old records.     Advanced Care Planning Documents:   Completed advances directives, advanced directives in chart. Electronically signed by Thurnell Saint, MD on 10/14/2021 at 1:19 PM    Thank you Emily Luz MD for the opportunity to be involved in this patient's care. If you have any questions or concerns please feel free to contact me at [phone].

## 2021-10-14 NOTE — CARE COORDINATION
Care being managed by [facility], ID, Hem Onc. LOS 4. Pt has Covid and is on high flow at 8L. Pt is IPTA from [facility]- has therapy  recs for SNF. Attempt to call pt but noted to have dropping O2 sat at this time- RN notified and will attend to pt. Call placed to son and Hira Alfaro. Agreeable to SNF at closest facility that accepts covid pts. List reviewed on phone- chose [address] as choice 1 and if needed then [address] [place] [address] as back up. Call placed to Shel[facility] and referral made. CM will continue to follow.   Paulina Juares RN

## 2021-10-15 NOTE — PLAN OF CARE
Problem: Falls - Risk of:  Goal: Will remain free from falls  Description: Will remain free from falls  10/15/2021 1127 by Franklin Fountain RN  Outcome: Ongoing     Problem: Falls - Risk of:  Goal: Absence of physical injury  Description: Absence of physical injury  10/15/2021 1127 by Franklin Fountain RN  Outcome: Ongoing     Problem: Airway Clearance - Ineffective  Goal: Achieve or maintain patent airway  10/15/2021 1127 by Franklin Fountain RN  Outcome: Ongoing     Problem: Gas Exchange - Impaired  Goal: Absence of hypoxia  10/15/2021 1127 by Franklin Fountain RN  Outcome: Ongoing     Problem: Gas Exchange - Impaired  Goal: Promote optimal lung function  10/15/2021 1127 by Franklin Fountain RN  Outcome: Ongoing     Problem: Breathing Pattern - Ineffective  Goal: Ability to achieve and maintain a regular respiratory rate  10/15/2021 1127 by Franklin Fountain RN  Outcome: Ongoing     Problem: Body Temperature -  Risk of, Imbalanced  Goal: Ability to maintain a body temperature within defined limits  10/15/2021 1127 by Franklin Fountain RN  Outcome: Ongoing     Problem: Body Temperature -  Risk of, Imbalanced  Goal: Will regain or maintain usual level of consciousness  10/15/2021 1127 by Franklin Fountain RN  Outcome: Ongoing     Problem:  Body Temperature -  Risk of, Imbalanced  Goal: Complications related to the disease process, condition or treatment will be avoided or minimized  10/15/2021 1127 by Fraknlin Fountain RN  Outcome: Ongoing     Problem: Isolation Precautions - Risk of Spread of Infection  Goal: Prevent transmission of infection  10/15/2021 1127 by Franklin Fountain RN  Outcome: Ongoing     Problem: Nutrition Deficits  Goal: Optimize nutritional status  10/15/2021 1127 by Franklin Fountain RN  Outcome: Ongoing     Problem: Risk for Fluid Volume Deficit  Goal: Maintain normal heart rhythm  10/15/2021 1127 by Franklin Fountain RN  Outcome: Ongoing     Problem: Risk for Fluid Volume Deficit  Goal: Maintain absence of muscle cramping  10/15/2021 112Nani

## 2021-10-15 NOTE — PLAN OF CARE
Nutrition Problem #1: Increased nutrient needs  Intervention: Food and/or Nutrient Delivery: Modify Current Diet, Start Oral Nutrition Supplement  Nutritional Goals: Consume 50% or greater of 3 meals per day and ONS during this admission.

## 2021-10-15 NOTE — CONSULTS
10/15/21 @ 17:56 left msg for Cardiac consult for [address] [place] [address] Cardiology.  Kim Chu

## 2021-10-15 NOTE — PROGRESS NOTES
Message via [employer] to Dr. Shelley Lucero    10/15/21 4:07 PM   FYI: patient is at rest with pulse rate sustaining in the 140's.     10/15/21 4:48 PM   FYI: Patient sustaining in 160's flipped into Afib RVR.

## 2021-10-15 NOTE — PROGRESS NOTES
Comprehensive Nutrition Assessment    Type and Reason for Visit:  Initial, RD Nutrition Re-Screen/LOS    Nutrition Recommendations/Plan:   1. Modify diet to carb control and encourage PO intake  2. RD to add glucerna with lunch   3. RD to order new updated wt  4. Monitor nutrition adequacy, pertinent labs, bowel habits, wt changes, and clinical progress    Nutrition Assessment:  LOS assessment: Pt with PMH of lung cancer and DM 2 admitted for falls and acute hypoxic resp failure due to covid-19. On high flow at 8L. On regular diet with PO intakes % of most meals, per chart review and pt. RD to modify diet to carb control due to high BG levels. Pt unsure of wt loss, will order new updated wt due to stated wt. Pt willing to trial ONS, RD to add glucerna with lunch. Pt familiar with carb control diet and follows it at home. Will continue to monitor. Malnutrition Assessment:  Malnutrition Status: At risk for malnutrition (Comment)    Context:  Chronic Illness       Estimated Daily Nutrient Needs:  Energy (kcal):  9465-3625 kcals/day; Weight Used for Energy Requirements:  Ideal (70 kg)     Protein (g):  70-98 g/day; Weight Used for Protein Requirements:  Ideal (1-1.4 g/kg)        Method Used for Fluid Requirements:  1 ml/kcal      Nutrition Related Findings:  BG high 184-294 x 24 hours. On insulin and steroids. DM dx. + BM yesterday. Wounds:  None (Skin tears and redness)       Current Nutrition Therapies:    ADULT DIET;  Regular    Anthropometric Measures:  · Height: 5' 8\" (172.7 cm)  · Current Body Weight: 140 lb (63.5 kg)     · Ideal Body Weight: 154 lbs; % Ideal Body Weight 90.9 %   · BMI: 21.3  · BMI Categories: Underweight (BMI less than 22) age over 72       Nutrition Diagnosis:   · Increased nutrient needs related to impaired respiratory function, increase demand for energy/nutrients as evidenced by other (comment) (Impaired resp fxn and covid-19 dx)    Nutrition Interventions:   Food and/or Nutrient Delivery:  Modify Current Diet, Start Oral Nutrition Supplement  Nutrition Education/Counseling:  Education declined   Coordination of Nutrition Care:  Continue to monitor while inpatient    Goals:  Consume 50% or greater of 3 meals per day and ONS during this admission.      Nutrition Monitoring and Evaluation:   Behavioral-Environmental Outcomes:  None Identified   Food/Nutrient Intake Outcomes:  Food and Nutrient Intake  Physical Signs/Symptoms Outcomes:  Biochemical Data, Chewing or Swallowing, Constipation, Weight     Discharge Planning:    Continue current diet, Continue Oral Nutrition Supplement     Electronically signed by Hoda Reed MS, RD, LD on 10/15/21 at 11:36 AM EDT    Contact: Office: [phone]; [address]: [phone]

## 2021-10-15 NOTE — ONCOLOGY
ONCOLOGY HEMATOLOGY CARE PROGRESS NOTE      SUBJECTIVE:     CBC stable from CLL standpoint. Feeling better, but tired. ROS:   The remaining 10 point review of symptoms is unremarkable. OBJECTIVE        Physical    VITALS:  /62   Pulse 87   Temp 98.2 °F (36.8 °C) (Oral)   Resp 20   Ht 5' 8\" (1.727 m)   Wt 140 lb (63.5 kg)   SpO2 95%   BMI 21.29 kg/m²   TEMPERATURE:  Current - Temp: 98.2 °F (36.8 °C); Max - Temp  Av.2 °F (36.8 °C)  Min: 98 °F (36.7 °C)  Max: 98.4 °F (36.9 °C)  PULSE OXIMETRY RANGE: SpO2  Av %  Min: 91 %  Max: 96 %  24HR INTAKE/OUTPUT:      Intake/Output Summary (Last 24 hours) at 10/15/2021 1157  Last data filed at 10/15/2021 0919  Gross per 24 hour   Intake 240 ml   Output    Net 240 ml       CONSTITUTIONAL:  awake, alert, cooperative, no apparent distress, HEENT oral pharynx , no scleral icterus  HEMATOLOGIC/LYMPHATICS:  no cervical lymphadenopathy, no supraclavicular lymphadenopathy, no axillary lymphadenopathy and no inguinal lymphadenopathy  LUNGS:  No increased work of breathing, good air exchange, clear to auscultation bilaterally, no crackles or wheezing  CARDIOVASCULAR:  , regular rate and rhythm, normal S1 and S2, no S3 or S4, and no murmur noted  ABDOMEN:  No scars, normal bowel sounds, soft, non-distended, non-tender, no masses palpated, no hepatosplenomegally  MUSCULOSKELETAL:  There is no redness, warmth, or swelling of the joints. EXTREMETIES: No clubbing cynosis or edema  NEUROLOGIC:  Awake, alert, oriented to name, place and time. Cranial nerves II-XII are grossly intact. Motor is 5 out of 5 bilaterally.    SKIN:  no bruising or bleeding      Data      Recent Labs     10/13/21  1327   WBC 37.0*   HGB 12.4*   HCT 37.4*   *   MCV 86.2        Recent Labs     10/13/21  1328      K 4.6      CO2 23   BUN 75*   CREATININE 2.4*     No results for input(s): AST, ALT, ALB, BILIDIR, BILITOT, ALKPHOS in the last 72 hours. Magnesium:  No results found for: MG      Problem List  Patient Active Problem List   Diagnosis    MVP (mitral valve prolapse)    Hyperlipemia    BPH (benign prostatic hyperplasia)    IGT (impaired glucose tolerance)    Dysmetabolic syndrome X    Benign essential tremor    HTN (hypertension)    Hematuria    Renal insufficiency    Diabetes mellitus ([facility] [address]    Murmur, cardiac    Thoracic compression fracture (HCC)    Bradycardia    Type 2 diabetes mellitus with hyperglycemia (HCC)    Right wrist pain    Abrasion of right forearm    Abrasion of left forearm    Lymphocytosis    Chronic kidney disease, stage III (moderate) (HCC)    Essential tremor    DM (diabetes mellitus), secondary, uncontrolled, w/neurologic complic ([address]    HTN (hypertension), benign    Dyslipidemia    Chronic kidney disease (CKD), stage IV (severe) (HCC)    Renal calculi    Chronic lymphocytic leukemia ([facility] [address]    COVID-19       ASSESSMENT AND PLAN    CLL  - Diagnosed by flow cytometry of the peripheral blood on 1/25/2019.  - FISH of the peripheral blood showed del(13q14) in 34% of cells. This is a good prognosis finding.  - More recently, he had a CT on 2/19/2021 that showed a 15 cm spleen and mild retroperitoneal LAD with PLTs of 87 K/mcL. We have been observing nonetheless. - He was last seen in the office on 9/20/2021. He CLL was unchanged. His CBC at that time showed: WBC 39.2 K/mcL, HGB 14.3 g/dL, HCT 44.0%, PLT 96 K/mcL. - His CBC as an inpatient here is not too different. He can continue to observe his CLL. - CBC stable today 10/15    NSCLC  - He has not been diagnosed by me with NSCLC. He is aware and now understands. COVID-19  - Defer to ID and hospitalist service. ONCOLOGIC DISPOSITION:  Per ID and the hospitalist service.     Jessie Hilario, CNP   Please contact through [facility]

## 2021-10-15 NOTE — PLAN OF CARE
Problem: Falls - Risk of:  Goal: Will remain free from falls  Description: Will remain free from falls  10/15/2021 1847 by Gabbi Mckeon RN  Outcome: Ongoing  10/15/2021 1127 by Vicki Ang RN  Outcome: Ongoing  10/15/2021 0523 by Yemi Dumont RN  Outcome: Ongoing  Goal: Absence of physical injury  Description: Absence of physical injury  10/15/2021 1847 by Gabbi Mckeon RN  Outcome: Ongoing  10/15/2021 1127 by Vicki Ang RN  Outcome: Ongoing  10/15/2021 0523 by Yemi Dumont RN  Outcome: Ongoing     Problem: Airway Clearance - Ineffective  Goal: Achieve or maintain patent airway  10/15/2021 1847 by Gabbi Mckeon RN  Outcome: Ongoing  10/15/2021 1127 by Vicki Ang RN  Outcome: Ongoing  10/15/2021 0523 by Yemi Dumont RN  Outcome: Ongoing     Problem: Gas Exchange - Impaired  Goal: Absence of hypoxia  10/15/2021 1847 by Gabbi Mckeon RN  Outcome: Ongoing  10/15/2021 1127 by Vicki Ang RN  Outcome: Ongoing  10/15/2021 0523 by Yemi Dumont RN  Outcome: Ongoing  Goal: Promote optimal lung function  10/15/2021 1847 by Gabbi Mckeon RN  Outcome: Ongoing  10/15/2021 1127 by Vicki Ang RN  Outcome: Ongoing  10/15/2021 0523 by Yemi Dumont RN  Outcome: Ongoing     Problem: Breathing Pattern - Ineffective  Goal: Ability to achieve and maintain a regular respiratory rate  10/15/2021 1847 by Gabbi Mckeon RN  Outcome: Ongoing  10/15/2021 1127 by Vicki Ang RN  Outcome: Ongoing  10/15/2021 0523 by Yemi Dumont RN  Outcome: Ongoing     Problem:  Body Temperature -  Risk of, Imbalanced  Goal: Ability to maintain a body temperature within defined limits  10/15/2021 1847 by Gabbi Mckeon RN  Outcome: Ongoing  10/15/2021 1127 by Vicki Ang RN  Outcome: Ongoing  10/15/2021 0523 by Yemi Dumont RN  Outcome: Ongoing  Goal: Will regain or maintain usual level of consciousness  10/15/2021 1847 by Gabbi Mckeon RN  Outcome: Ongoing  10/15/2021 1127 by Brenda Pearson RN  Outcome: Ongoing  10/15/2021 0523 by Abdulkadir Sagastume RN  Outcome: Ongoing  Goal: Complications related to the disease process, condition or treatment will be avoided or minimized  10/15/2021 1847 by Ximena Cook RN  Outcome: Ongoing  10/15/2021 1127 by Brenda Pearson RN  Outcome: Ongoing  10/15/2021 0523 by Abdulkadir Sagastume RN  Outcome: Ongoing     Problem: Isolation Precautions - Risk of Spread of Infection  Goal: Prevent transmission of infection  10/15/2021 1847 by Ximena Cook RN  Outcome: Ongoing  10/15/2021 1127 by Brenda Pearson RN  Outcome: Ongoing  10/15/2021 0523 by Abdulkadir Sagastume RN  Outcome: Ongoing     Problem: Nutrition Deficits  Goal: Optimize nutritional status  10/15/2021 1847 by Ximena Cook RN  Outcome: Ongoing  10/15/2021 1127 by Brenda Pearson RN  Outcome: Ongoing  10/15/2021 0523 by Abdulkadir Sagastume RN  Outcome: Ongoing     Problem: Risk for Fluid Volume Deficit  Goal: Maintain normal heart rhythm  10/15/2021 1847 by Ximena Cook RN  Outcome: Ongoing  10/15/2021 1127 by Brenda Pearson RN  Outcome: Ongoing  10/15/2021 0523 by Abdulkadir Sagastume RN  Outcome: Ongoing  Goal: Maintain absence of muscle cramping  10/15/2021 1847 by Ximena Cook RN  Outcome: Ongoing  10/15/2021 1127 by Brenda Pearson RN  Outcome: Ongoing  10/15/2021 0523 by Abdulkadir Sagastume RN  Outcome: Ongoing  Goal: Maintain normal serum potassium, sodium, calcium, phosphorus, and pH  10/15/2021 1847 by Ximena Cook RN  Outcome: Ongoing  10/15/2021 1127 by Brenda Pearson RN  Outcome: Ongoing  10/15/2021 0523 by Abdulkadir Sagastume RN  Outcome: Ongoing     Problem: Loneliness or Risk for Loneliness  Goal: Demonstrate positive use of time alone when socialization is not possible  10/15/2021 1847 by Ximena Cook RN  Outcome: Ongoing  10/15/2021 1127 by Brenda Pearson RN  Outcome: Ongoing  10/15/2021 0523 by Abdulkadir Sagastume RN  Outcome: Ongoing     Problem: Fatigue  Goal: Verbalize increase energy and improved vitality  10/15/2021 1847 by Kleber Barber RN  Outcome: Ongoing  10/15/2021 1127 by Ziggy Fletcher RN  Outcome: Ongoing  10/15/2021 0523 by Caryn Ramírez RN  Outcome: Ongoing     Problem: Patient Education: Go to Patient Education Activity  Goal: Patient/Family Education  10/15/2021 1847 by Kleber Barber RN  Outcome: Ongoing  10/15/2021 1127 by Ziggy Fletcher RN  Outcome: Ongoing  10/15/2021 0523 by Caryn Ramírez RN  Outcome: Ongoing     Problem: Skin Integrity:  Goal: Will show no infection signs and symptoms  Description: Will show no infection signs and symptoms  10/15/2021 1847 by Kleber Barber RN  Outcome: Ongoing  10/15/2021 1127 by Ziggy Fletcher RN  Outcome: Ongoing  10/15/2021 0523 by Caryn Ramírez RN  Outcome: Ongoing  Goal: Absence of new skin breakdown  Description: Absence of new skin breakdown  10/15/2021 1847 by Kleber Barber RN  Outcome: Ongoing  10/15/2021 1127 by Ziggy Fletcher RN  Outcome: Ongoing  10/15/2021 0523 by Caryn Ramírez RN  Outcome: Ongoing     Problem: Nutrition  Goal: Optimal nutrition therapy  10/15/2021 1847 by Kleber Barber RN  Outcome: Ongoing  10/15/2021 1142 by Joselyn Wilson, MS, RD, LD  Note: Nutrition Problem #1: Increased nutrient needs  Intervention: Food and/or Nutrient Delivery: Modify Current Diet, Start Oral Nutrition Supplement  Nutritional Goals: Consume 50% or greater of 3 meals per day and ONS during this admission.

## 2021-10-15 NOTE — PROGRESS NOTES
Hospitalist Progress Note      PCP: No primary care provider on file. Date of Admission: 10/10/2021    Chief Ruma Monae 37 y. o. male who presented to [facility] with falls and SOB. Notes he lives alone in an assisted living apartment. States he was recently diagnosed with lung cancer. Is in the process of that work-up. Was diagnosed with COVID. He was vaccinated with both doses. He was home and became weak. He fell multiple times. Came to the ED and found to be hypoxic. Started on oxygen. He does not wear oxygen at baseline.         Subjective: denies signif sob, still around 9L , pulse ox placed on forehead, no overnight issues, noted tachycardia in the afternoon(afib rvr on ekg)      Medications:  Reviewed    Infusion Medications    sodium chloride      dextrose       Scheduled Medications    insulin lispro  0-18 Units SubCUTAneous TID WC    insulin lispro  0-9 Units SubCUTAneous Nightly    insulin glargine  20 Units SubCUTAneous Nightly    aspirin  81 mg Oral Daily    escitalopram  10 mg Oral Daily    rosuvastatin  40 mg Oral Daily    tamsulosin  0.4 mg Oral Daily    sodium chloride flush  5-40 mL IntraVENous 2 times per day    heparin (porcine)  5,000 Units SubCUTAneous 3 times per day    methylPREDNISolone  40 mg IntraVENous Q12H    Vitamin D  2,000 Units Oral Daily     PRN Meds: prochlorperazine, sodium chloride flush, sodium chloride, polyethylene glycol, acetaminophen **OR** acetaminophen, glucose, dextrose, glucagon (rDNA), dextrose      Intake/Output Summary (Last 24 hours) at 10/15/2021 0907  Last data filed at 10/14/2021 0930  Gross per 24 hour   Intake 300 ml   Output    Net 300 ml       Physical Exam Performed:    /67   Pulse 78   Temp 98.4 °F (36.9 °C) (Oral)   Resp 20   Ht 5' 8\" (1.727 m)   Wt 140 lb (63.5 kg)   SpO2 91%   BMI 21.29 kg/m²     General appearance: Frail, elderly. HEENT: Pupils equal, round, and reactive to light. Conjunctivae/corneas clear. Neck: Supple, with full range of motion. No jugular venous distention. Trachea midline. Respiratory:  improved but still inc'd respiratory effort. Clear to auscultation, bilaterally without Wheezes/Rhonchi. Fine rales noted  Cardiovascular: Regular rate and rhythm with normal S1/S2 without murmurs, rubs or gallops. Abdomen: Soft, non-tender, non-distended with normal bowel sounds. Musculoskeletal: Generalized weakness. Skin: Skin color, texture, turgor normal.  No rashes or lesions. Neurologic:  Neurovascularly intact without any focal sensory/motor deficits. Cranial nerves: II-XII intact, grossly non-focal.  Psychiatric: Alert and oriented, thought content appropriate, normal insight  Capillary Refill: Brisk,3 seconds, normal   Peripheral Pulses: +2 palpable, equal bilaterally     Labs:   Recent Labs     10/13/21  1327   WBC 37.0*   HGB 12.4*   HCT 37.4*   *     Recent Labs     10/13/21  1328      K 4.6      CO2 23   BUN 75*   CREATININE 2.4*   CALCIUM 9.3     No results for input(s): AST, ALT, BILIDIR, BILITOT, ALKPHOS in the last 72 hours. No results for input(s): INR in the last 72 hours. No results for input(s): Bella Height in the last 72 hours. Urinalysis:      Lab Results   Component Value Date    NITRU Negative 10/10/2021    WBCUA 0-2 10/10/2021    BACTERIA 2+ 01/02/2020    RBCUA None seen 10/10/2021    BLOODU LARGE 10/10/2021    SPECGRAV 1.025 10/10/2021    GLUCOSEU Negative 10/10/2021       Radiology:  XR THORACIC SPINE (MIN 4 VIEWS)   Final Result   Multiple old compression fractures status post vertebral body enhancement T10   and T12      No acute fracture definitely identified         XR CHEST PORTABLE   Final Result   No acute cardiopulmonary disease      Stable interstitial opacities, likely fibrotic.                  Assessment/Plan:    Active Hospital Problems    Diagnosis     COVID-19 [U07.1]        Acute hypoxic resp failure- due to covid-19  Date of symptom onset - September 27   Date of diagnosis - 10/4/21  Date of admission - 10/10/21  Vaccinated - both doses of Moderna approx 6 months ago  CXR - fibrotic changes  Troponin and EKG -<0.01, sinus, RBBB, PVCs  Supplemental oxygen : -  currently on 1-2LPM to maintain sats >94%, wean as tolerated  Labs : ferritin, daily D-dimer, CRP for prognostication  Steroids : IV Solu-Medrol 40 mg twice daily   Antiviral : IV remdesivir - unable to use as with CKD  IV antibiotics : hold, low suspicion of concomitant bacterial pneumonia but will start if needed, follow fever curve as WBC not helpful in setting of chronic lymphocytosis  Anticoagulation : heparin  IV fluids :  Avoided overzealous fluids  Intubation/MV : discussed with patient, patient agreeable if needed  Consider Tocilizumab if CRP >75   Continue excellent supportive care, QD family updates, droplet plus isolation   -ID consulted, apprec recs, pt outside window for mab/antiviral     Tachycardia- noted 1/15 afternoon, concern for afib rvr  -dilt ggt started  -on tele  -cards consulted  -transferred to     Questionable Lung cancer - recent diagnosis per pt. Started work-up as outpt? No records that were found  Oncology consulted, recent CT chest noted fibrosis and nodules(improved in size per note)     Lymphocytosis - chronically elevated WBCs. Monitor CBC. Oncology consulted(noted hx of CLL, no need for tx now, obs only)  -IgG sent by onc        HLD - continue statin - monitor LFTs. Stable.     DM2- SSI and lantus. Monitor BS as will be elevated due to steroids and illness. A1c 7.5 July, 2021.      CKD - stage 4 - BL 2-2.5 BL. Currently 2.6. Monitor BMP.      Renal Osteodystrophy - continue Vit D - 2000IU daily.     DVT Prophylaxis: heparin  Diet: ADULT DIET;  Regular  Code Status: Limited (discussed 10/14 with son Latonia Cali on phone)        PT/OT Eval Status: ordered and rec snf   as of 10/13      Dispo - unclear, code status adjusted, guarded prognosis, improved oxygenation, transfer to c4, start dilt ggt, cards consult    Jackson Phelan MD

## 2021-10-16 PROBLEM — U07.1 ACUTE RESPIRATORY FAILURE DUE TO COVID-19 (HCC): Status: ACTIVE | Noted: 2021-01-01

## 2021-10-16 PROBLEM — J96.00 ACUTE RESPIRATORY FAILURE DUE TO COVID-19 (HCC): Status: ACTIVE | Noted: 2021-01-01

## 2021-10-16 PROBLEM — I48.91 ATRIAL FIBRILLATION WITH RVR (HCC): Status: ACTIVE | Noted: 2021-01-01

## 2021-10-16 NOTE — PROGRESS NOTES
Pt responded very well to oral rate control meds ordered. dilt gtt weaned at [phone] and off one hour post oral admin. Hr currently 70 bpm. sbp 105. Map 73.

## 2021-10-16 NOTE — CONSULTS
Electrophysiology Consultation   Date: 10/16/2021  Admit Date:  10/10/2021  Reason for Consultation: Atrial fibrillation with RVR. Consult Requesting Physician: Cipriano Blackwell MD     Chief Complaint   Patient presents with    Fall     Hx of parkinsons. increasing amount of falls recently. Pt reports he just falls over.  Shortness of Breath     Pt denies resp Hx. 87% on room air on arrival     HPI:  Mr. Evelia Sapp is a pleasant 81year old male with a medical history significant for chronic renal insufficiency, diabetes mellitus type II, Parkinson's disease, CLL, arrhythmia/atrial fibrillation, hypertension, and hyperlipidemia who presents from home with COVID-19 pneumonia (fully vaccinated) now complicated by atrial fibrillation with RVR. According to patient he was in his usual state of health until 10/06/2021 when he presented with shortness of breath and URI symptoms. At that time he tested and was diagnosed with URI only. He was discharged in stable condition. Unfortunately his symptoms worsened and he began to suffer from recurrent falls and so was brought back to [facility] where he was found to have COVID-19 pneumonia. He was admitted for further evaluation and care. Patient somewhat poor historian. Son, Alejandro Gómez, was very helpful to interview over the phone. His shortness of breath has improved but persists. Patient denies fevers, chest pain, orthopnea, PND, lower extremity edema, abdominal swelling, chills, visual changes, headaches, sore throat, cough, abdominal pain, nausea, vomiting, bleeding, bruising, dysuria, muscle/joint pain, confusion, depression, anxiety, skin lesions, etc.    Emergency Room/Hospital Course:  Patient was evaluated in the ER on 10/10/2021. His CBC was notable for elevated WBC in setting of known CLL. His CMP was notable for elevated creatinine above baseline at 2.7 (baseline 2.2). His chest CT was showed fibrosis and scaring and possible pneumonitis.   Patient was placed on telemetry which later showed atrial fibrillation with RVR and this was confirmed by EKG. Current rhythm: Atrial fibrillation with RVR. Known history of atrial fibrillation: yes - long history per patient   Valvular disease: unknown  Associated symptoms: shortness of breath  Heart rate is not controlled  Previous cardioversion and/or ablation: no  History of CAD: No  History of sleep apnea: No  History of ETOH abuse/drug abuse: No  History of thyroid disease: No  Elevated BNP: Yes, mildly elevated  Left atrial size is unknonwn    Past Medical History:   Diagnosis Date    Back pain     Cancer ([address]     skin    Chronic kidney disease     COVID-19 10/10/2021    [facility] mellitus [facility]     Essential tremor     History of BPH     Hypercholesteremia     Renal insufficiency     Type 2 diabetes mellitus with hyperglycemia ([facility] [address] 12/21/2017        Past Surgical History:   Procedure Laterality Date    CATARACT REMOVAL      CYSTOSCOPY      HERNIA REPAIR  1958    KIDNEY STONE REMOVAL Left 6/1/2021    LEFT PERCUTANEOUS NEPHROLITHOTOMY performed by Valdez Carrasco MD at [address] [facility]  8/24/2012    KYPHOPLASTY T10 AND T12    NEPHROSTOMY Left 6/1/2021    cystoscopy, ramirez catheter placement performed by Valdez Carrasco MD at [address] [facility] NEPHROSTOMY Left 6/1/2021    NEPHROSTOMY PLACEMENT performed by Hi Maddox MD at [address] Left 06/01/2021    cystoscopy, ramirez catheter placement (Left Bladder) nephrostomy placement left pelvis left percutaneous nephrolithotomy       No Known Allergies    Social History:  Reviewed. reports that he quit smoking about 43 years ago. His smoking use included cigarettes. He has a 10.00 pack-year smoking history. He has never used smokeless tobacco. He reports current alcohol use. He reports that he does not use drugs. Family History:  Reviewed. family history includes Heart Disease in his father and sister.    No premature CAD.     Review of System:  All other systems reviewed except for that noted above. Pertinent negatives and positives are:     · General: negative for fever, chills   · Ophthalmic ROS: negative for - eye pain or loss of vision  · ENT ROS: negative for - headaches, sore throat   · Respiratory: negative for - cough, sputum  · Cardiovascular: Reviewed in HPI  · Gastrointestinal: negative for - abdominal pain, diarrhea, N/V  · Hematology: negative for - bleeding, blood clots, bruising or jaundice  · Genito-Urinary:  negative for - Dysuria or incontinence  · Musculoskeletal: negative for - Joint swelling, muscle pain  · Neurological: negative for - confusion, dizziness, headaches   · Psychiatric: No anxiety, no depression. · Dermatological: negative for - rash    Physical Examination:  Vitals:    10/16/21 1013   BP: 120/71   Pulse: 148   Resp:    Temp:    SpO2: 91%      No intake or output data in the 24 hours ending 10/16/21 1042  No intake/output data recorded. Wt Readings from Last 3 Encounters:   10/16/21 134 lb 11.2 oz (61.1 kg)   10/06/21 145 lb (65.8 kg)   21 146 lb (66.2 kg)     Temp  Av.7 °F (36.5 °C)  Min: 97.4 °F (36.3 °C)  Max: 98.1 °F (36.7 °C)  Pulse  Av.2  Min: 94  Max: 180  BP  Min: 89/63  Max: 123/73  SpO2  Av %  Min: 87 %  Max: 99 %    · Telemetry: Atrial fibrillation with RVR. · Constitutional: Alert. Oriented to person, place, and time. No distress. · Head: Normocephalic and atraumatic. · Mouth/Throat: Lips appear moist. Oropharynx is clear and moist.  · Eyes: Conjunctivae normal. EOM are normal.   · Neck: Neck supple. No lymphadenopathy. No rigidity. No JVD present. · Cardiovascular: Tachycardic. Irregular rate and rhythm w/o M/G/R  · Pulmonary/Chest: Moving air well. On NC. · Abdominal: Soft. Normal bowel sounds present. No distension, No tenderness. No splenomegaly. No hernia. · Musculoskeletal: No tenderness. No edema    · Neurological: Alert and oriented. Cranial nerve II-XII grossly intact. · Skin: Skin is warm and dry. No rash, lesions, ulcerations noted. · Psychiatric: No anxiety nor agitation. Labs:  Reviewed. Recent Labs     10/13/21  1328      K 4.6      CO2 23   BUN 75*   CREATININE 2.4*     Recent Labs     10/13/21  1327 10/16/21  0052   WBC 37.0* 39.1*   HGB 12.4* 12.9*   HCT 37.4* 39.7*   MCV 86.2 87.9   * 193     Lab Results   Component Value Date    TROPONINI 0.01 10/10/2021     No results found for: BNP  Lab Results   Component Value Date    PROTIME 12.9 06/01/2021    PROTIME 10.2 08/24/2012    INR 1.12 06/01/2021    INR 0.89 08/24/2012     Lab Results   Component Value Date    CHOL 113 07/28/2021    HDL 32 07/28/2021    HDL 39 12/15/2011    TRIG 192 07/28/2021       Diagnostic and imaging results reviewed. ECG: Atrial fibrillation with RVR. RBBB. Echo: None. Cath: None. I independently reviewed the ECG and telemetry.     Scheduled Meds:   insulin lispro  0-18 Units SubCUTAneous TID WC    insulin lispro  0-9 Units SubCUTAneous Nightly    insulin glargine  20 Units SubCUTAneous Nightly    aspirin  81 mg Oral Daily    escitalopram  10 mg Oral Daily    rosuvastatin  40 mg Oral Daily    tamsulosin  0.4 mg Oral Daily    sodium chloride flush  5-40 mL IntraVENous 2 times per day    heparin (porcine)  5,000 Units SubCUTAneous 3 times per day    methylPREDNISolone  40 mg IntraVENous Q12H     Continuous Infusions:   dilTIAZem 15 mg/hr (10/16/21 1012)    sodium chloride      dextrose       PRN Meds:.prochlorperazine, sodium chloride flush, sodium chloride, polyethylene glycol, acetaminophen **OR** acetaminophen, glucose, dextrose, glucagon (rDNA), dextrose     Assessment:   Patient Active Problem List    Diagnosis Date Noted    COVID-19 10/10/2021    Renal calculi 06/01/2021    Chronic kidney disease (CKD), stage IV (severe) [facility] 09/22/2020    Chronic lymphocytic leukemia ([facility] [facility] 01/15/2020    Essential tremor 11/14/2019    DM (diabetes mellitus), secondary, uncontrolled, w/neurologic complic ([address] 91/62/0901    HTN (hypertension), benign 11/14/2019    Dyslipidemia 11/14/2019    Lymphocytosis 07/01/2019    Chronic kidney disease, stage III (moderate) ([address] 07/01/2019    Right wrist pain 06/11/2019    Abrasion of right forearm 06/11/2019    Abrasion of left forearm 06/11/2019    Type 2 diabetes mellitus with hyperglycemia ([address] 12/21/2017    Bradycardia 07/08/2014    Diabetes mellitus ([address] 10/03/2012    Murmur, cardiac 10/03/2012    Thoracic compression fracture ([address] 10/03/2012    MVP (mitral valve prolapse) 10/02/2012    Hyperlipemia 10/02/2012    BPH (benign prostatic hyperplasia) 10/02/2012    IGT (impaired glucose tolerance) 19/45/1944    Dysmetabolic syndrome X 43/87/2833    Benign essential tremor 10/02/2012    HTN (hypertension) 10/02/2012    Hematuria 10/02/2012    Renal insufficiency 10/02/2012      Active Hospital Problems    Diagnosis Date Noted    COVID-19 [U07.1] 10/10/2021     Mr. Ita Young is a pleasant 81year old male with a medical history significant for chronic renal insufficiency, diabetes mellitus type II, Parkinson's disease, CLL, arrhythmia/atrial fibrillation, hypertension, and hyperlipidemia who presents from home with COVID-19 pneumonia (fully vaccinated) now complicated by atrial fibrillation with RVR. Problem List:  1. Atrial fibrillation with RVR. 2. COVID-19 pneumonia/acute respiratory failure. 3. Acute on chronic renal failure. Assessment and Plan:  1. Atrial fibrillation with RVR. Patient is a pleasant 81year old male with a medical history significant for paroxysmal atrial fibrillation, hypertension, hyperlipidemia, diabetes mellitus type II and Parkinson's disease (tremors only, no history of falls) who presented from home with acute respiratory failure in setting of COVID-19 pneumonia now complicated by atrial fibrillation with RVR.       Afib risk factors including age, HTN, obesity, inactivity and ROGELIO were discussed with patient. Risk factor modification recommended. Patient's [id number] score is 4 with a stroke risk of 4.8%. Patients HASBLED score is 2-3. We discussed oral anticoagulation to decrease the risk of thromboembolic events including stroke. Benefits and alternatives were discussed with patient. Risk of bleeding was discussed. Patient verbalized understanding. Different forms of anticoagulants including warfarin (Coumadin), Dabigatran (Pradaxa), Rivaroxaban (Xarelto), and Apixaban (Eliquis) were discussed. Patient and son opted to start with apixaban. Rate control strategy options including cardioversion, atrial fibrillation ablation, pacemaker with AVN ablation, anti-arrhythmic strategy, and rate control strategy were discussed with patient. Risks, benefits and alternative of each treatment options were explained. All questions were answered. Patient has opted for rate control. - I will order thyroid function testing.  - I will order echocardiogram.  This may be deferred depending on policy. I will order for Monday as it is not urgent. - Continue diltiazem drip. - Start oral diltiazem. - Start digoxin with renal dosing.  - Permissive tachycardia in setting of pneumonia is acceptable. Target HR less than 150.  - Consider AKASH cardioversion with or without amiodarone if tachycardia persists on Monday. - We will continue to follow along with you. 2. COVID-19 pneumonia/acute respiratory failure. - Per primary team.    3. Acute on chronic renal failure. - Per primary team.    Thank you for allowing me to participate in the care of Zoran Apodaca . If you have any questions/comments, please do not hesitate to contact us.     Andreea Mazariegos MD  Cardiac Electrophysiology  [address] [facility]  [phone] [facility]

## 2021-10-16 NOTE — PROGRESS NOTES
Hospitalist Progress Note      PCP: No primary care provider on file. Date of Admission: 10/10/2021    Chief Complaint: [facility] Hospital Course: 81 y. o. male who presented to [facility] with falls and SOB. Notes he lives alone in an assisted living apartment. States he was recently diagnosed with lung cancer. Is in the process of that work-up. Was diagnosed with COVID. He was vaccinated with both doses. He was home and became weak. He fell multiple times. Came to the ED and found to be hypoxic. Started on oxygen. He does not wear oxygen at baseline.     Subjective:   Seen and examined at bedside. No acute events overnight. He was awake alert oriented x4. He was eating lunch. Oxygen requirement slowly going down to 8 L today. He complains a little bit of dizziness when getting out of bed. Otherwise no new complaints. Denies chest pain. Rate controlled on Cardizem drip.     Medications:  Reviewed    Infusion Medications    dilTIAZem Stopped (10/16/21 1225)    sodium chloride      dextrose       Scheduled Medications    apixaban  2.5 mg Oral BID    digoxin  0.0625 mg Oral Q48H    dilTIAZem  120 mg Oral Daily    insulin glargine  23 Units SubCUTAneous Nightly    insulin lispro  0-18 Units SubCUTAneous TID WC    insulin lispro  0-9 Units SubCUTAneous Nightly    aspirin  81 mg Oral Daily    escitalopram  10 mg Oral Daily    rosuvastatin  40 mg Oral Daily    tamsulosin  0.4 mg Oral Daily    sodium chloride flush  5-40 mL IntraVENous 2 times per day    methylPREDNISolone  40 mg IntraVENous Q12H     PRN Meds: perflutren lipid microspheres, prochlorperazine, sodium chloride flush, sodium chloride, polyethylene glycol, acetaminophen **OR** acetaminophen, glucose, dextrose, glucagon (rDNA), dextrose      Intake/Output Summary (Last 24 hours) at 10/16/2021 1728  Last data filed at 10/16/2021 1700  Gross per 24 hour   Intake 730 ml   Output 100 ml   Net 630 ml       Physical Exam Performed:    /63   Pulse 77   Temp 98 °F (36.7 °C)   Resp 22   Ht 5' 8\" (1.727 m)   Wt 134 lb 11.2 oz (61.1 kg)   SpO2 91%   BMI 20.48 kg/m²     General appearance: No apparent distress, appears stated age and cooperative. HEENT: Pupils equal, round, and reactive to light. Conjunctivae/corneas clear. Neck: Supple, with full range of motion. No jugular venous distention. Trachea midline. Respiratory: Coarse breath sound bilateral lung fields. Cardiovascular: Irregularly irregular with normal S1/S2 without murmurs, rubs or gallops. Abdomen: Soft, non-tender, non-distended with normal bowel sounds. Musculoskeletal: No clubbing, cyanosis or edema bilaterally. Full range of motion without deformity. Skin: Skin color, texture, turgor normal.  No rashes or lesions. Neurologic:  Neurovascularly intact without any focal sensory/motor deficits. Cranial nerves: II-XII intact, grossly non-focal.  Psychiatric: Alert and oriented, thought content appropriate, normal insight  Capillary Refill: Brisk,3 seconds, normal   Peripheral Pulses: +2 palpable, equal bilaterally       Labs:   Recent Labs     10/16/21  0052   WBC 39.1*   HGB 12.9*   HCT 39.7*        Recent Labs     10/16/21  0050      K 5.2*      CO2 17*   BUN 68*   CREATININE 2.2*   CALCIUM 9.1     Recent Labs     10/16/21  0050   AST 58*   ALT 51*   BILITOT 1.5*   ALKPHOS 88     No results for input(s): INR in the last 72 hours. No results for input(s): Avila Massed in the last 72 hours.     Urinalysis:      Lab Results   Component Value Date    NITRU Negative 10/10/2021    WBCUA 0-2 10/10/2021    BACTERIA 2+ 01/02/2020    RBCUA None seen 10/10/2021    BLOODU LARGE 10/10/2021    SPECGRAV 1.025 10/10/2021    GLUCOSEU Negative 10/10/2021       Radiology:  XR THORACIC SPINE (MIN 4 VIEWS)   Final Result   Multiple old compression fractures status post vertebral body enhancement T10   and T12      No acute fracture definitely identified         XR CHEST PORTABLE   Final Result   No acute cardiopulmonary disease      Stable interstitial opacities, likely fibrotic. Assessment/Plan:    Active Hospital Problems    Diagnosis     Acute respiratory failure due to COVID-19 ([facility] [address] [U07.1, J96.00]     Atrial fibrillation with RVR (HCC) [I48.91]     COVID-19 [U07.1]     Chronic kidney disease (CKD), stage IV (severe) (HCC) [N18.4]     Chronic lymphocytic leukemia (HCC) [C91.10]        Acute hypoxic resp failure- due to covid-19  Date of symptom onset - September 27   Date of diagnosis - 10/4/21  Date of admission - 10/10/21  Vaccinated - both doses of Moderna approx 6 months ago  CXR - fibrotic changes  Troponin and EKG -<0.01, sinus, RBBB, PVCs  Supplemental oxygen : -  currently on 1-2LPM to maintain sats >94%, wean as tolerated  Labs : ferritin, daily D-dimer, CRP for prognostication  Steroids : IV Solu-Medrol 40 mg twice daily   Antiviral : IV remdesivir - unable to use as with CKD  IV antibiotics : hold, low suspicion of concomitant bacterial pneumonia but will start if needed, follow fever curve as WBC not helpful in setting of chronic lymphocytosis  Anticoagulation : heparin  IV fluids :  Avoided overzealous fluids  Intubation/MV : discussed with patient, patient agreeable if needed  Consider Tocilizumab if CRP >75   Continue excellent supportive care, QD family updates, droplet plus isolation   -ID consulted, apprec recs, pt outside window for mab/antiviral     New onset A. fib RVR-currently rate controlled  -Likely secondary to COVID-19 pneumonia/acute respiratory failure  -First noted on 10/15/2021 telemetry monitor  -Cardiology consulted, appreciate input.  -Cardiology continued diltiazem drip, started on oral diltiazem, started oxygen with renal dosing.  -Thyroid function tests pending, cardiology will order echocardiogram.  -Cardiology permissive tachycardia in the setting of pneumonia is acceptable.   Target heart rate less than 150.  -Started apixaban 5 mg twice daily.       Questionable Lung cancer - recent diagnosis per pt. Started work-up as outpt? No records that were found  Oncology consulted, recent CT chest noted fibrosis and nodules(improved in size per note)     Lymphocytosis - chronically elevated WBCs. Monitor CBC. Oncology consulted(noted hx of CLL, no need for tx now, obs only)  -IgG sent by onc        HLD - continue statin - monitor LFTs. Stable.     DM2- SSI and lantus. Monitor BS as will be elevated due to steroids and illness. A1c 7.5 July, 2021.      CKD - stage 4 - BL 2-2.5 BL. Currently 2.6. Monitor BMP.      Renal Osteodystrophy - continue Vit D - 2000IU daily. DVT Prophylaxis: Apixaban  Diet: ADULT DIET;  Regular; 4 carb choices (60 gm/meal)  Adult Oral Nutrition Supplement; Diabetic Oral Supplement  Code Status: Limited    PT/OT Eval Status: Ordered    Dispo -pending clinical improvement    Lala Comer MD

## 2021-10-16 NOTE — FLOWSHEET NOTE
10/15/21 2000   Assessment   Charting Type Shift assessment   Neurological   Neuro (WDL) X  (essential tremor )   Level of Consciousness Alert (0)   Orientation Level Oriented X4   Cognition Appropriate judgement; Appropriate safety awareness; Appropriate attention/concentration; Appropriate for developmental age; Follows commands   Language Clear   [place] Coma Scale   Eye Opening 4   Best Verbal Response 5   Best Motor Response 6   [place] Coma Scale Score 15   HEENT   HEENT (WDL) WDL   Respiratory   Respiratory (WDL) X  (COVID/HF 8L)   Respiratory Pattern Tachypneic   Respiratory Depth Normal   Respiratory Quality/Effort Unlabored;Dyspnea with exertion   Chest Assessment Chest expansion symmetrical;Trachea midline   L Breath Sounds Clear   R Breath Sounds Clear   Cardiac   Cardiac (WDL) X   Cardiac Regularity Irregular   Heart Sounds S1, S2   Cardiac Rhythm A fib RVR   Rhythm Interpretation   Pulse 128   Cardiac Monitor   Telemetry Monitor On Yes   Telemetry Audible Yes   Telemetry Alarms Set Yes   Gastrointestinal   Abdominal (WDL) WDL   Peripheral Vascular   Peripheral Vascular (WDL) WDL   Edema None   Skin Color/Condition   Skin Color/Condition (WDL) X   Skin Color Pale   Skin Condition/Temp Warm   Skin Integrity   Skin Integrity (WDL) X   Skin Integrity Blister;Tear   Location BUE scattered    Multiple Skin Integrity Sites Yes   Skin Integrity Site 3   Skin Integrity Location 3 Redness    Location 3 heels/coccyx   Musculoskeletal   Musculoskeletal (WDL) X   RUE Weakness   LUE Weakness   RL Extremity Weakness; Unsteady   LL Extremity Weakness; Unsteady   Genitourinary   Genitourinary (WDL) WDL   Anus/Rectum   Anus/Rectum (WDL) WDL   Psychosocial   Psychosocial (WDL) WDL

## 2021-10-17 PROBLEM — R79.89 ELEVATED FERRITIN LEVEL: Status: ACTIVE | Noted: 2021-01-01

## 2021-10-17 PROBLEM — J84.10 PULMONARY FIBROSIS (HCC): Status: ACTIVE | Noted: 2021-01-01

## 2021-10-17 PROBLEM — J43.2 CENTRILOBULAR EMPHYSEMA (HCC): Status: ACTIVE | Noted: 2021-01-01

## 2021-10-17 PROBLEM — R91.8 LUNG NODULES: Status: ACTIVE | Noted: 2021-01-01

## 2021-10-17 PROBLEM — R79.89 ELEVATED D-DIMER: Status: ACTIVE | Noted: 2021-01-01

## 2021-10-17 PROBLEM — K80.20 CALCULUS OF GALLBLADDER WITHOUT CHOLECYSTITIS WITHOUT OBSTRUCTION: Status: ACTIVE | Noted: 2021-01-01

## 2021-10-17 NOTE — PROGRESS NOTES
Pt resting quietly in bed. No C/o voiced. SPO2 -90-92 % on 10. Pt kept warm and comfortable in bed. Will continue to monitor.  MARTÍNEZN

## 2021-10-17 NOTE — PROGRESS NOTES
Hospitalist Progress Note      PCP: No primary care provider on file. Date of Admission: 10/10/2021    Chief Complaint: [facility] Course: 81 y. o. male who presented to [facility] with falls and SOB. Notes he lives alone in an assisted living apartment. States he was recently diagnosed with lung cancer. Is in the process of that work-up. Was diagnosed with COVID. He was vaccinated with both doses. He was home and became weak. He fell multiple times. Came to the ED and found to be hypoxic. Started on oxygen. He does not wear oxygen at baseline.     Subjective:   Seen and examined at bedside. No acute events overnight. He was awake alert oriented x4. Oxygen requirement up to to 10 L today. .  Cardizem drip stopped yesterday and rate controlled on on Cardizem 120 mg daily. Complains of chest tightness and SOB. Will order breathing treatment. Consult pulmonology.     Medications:  Reviewed    Infusion Medications    sodium chloride      dextrose       Scheduled Medications    budesonide-formoterol  2 puff Inhalation BID    insulin glargine  15 Units SubCUTAneous BID    [START ON 10/18/2021] pantoprazole  40 mg Oral QAM AC    apixaban  2.5 mg Oral BID    digoxin  0.0625 mg Oral Q48H    dilTIAZem  120 mg Oral Daily    influenza virus vaccine  0.5 mL IntraMUSCular Prior to discharge    insulin lispro  0-18 Units SubCUTAneous TID WC    insulin lispro  0-9 Units SubCUTAneous Nightly    aspirin  81 mg Oral Daily    escitalopram  10 mg Oral Daily    rosuvastatin  40 mg Oral Daily    tamsulosin  0.4 mg Oral Daily    sodium chloride flush  5-40 mL IntraVENous 2 times per day    methylPREDNISolone  40 mg IntraVENous Q12H     PRN Meds: guaiFENesin-dextromethorphan, levalbuterol, perflutren lipid microspheres, prochlorperazine, sodium chloride flush, sodium chloride, polyethylene glycol, acetaminophen **OR** acetaminophen, glucose, dextrose, glucagon (rDNA), dextrose      Intake/Output BLOODU LARGE 10/10/2021    SPECGRAV 1.025 10/10/2021    GLUCOSEU Negative 10/10/2021       Radiology:  XR THORACIC SPINE (MIN 4 VIEWS)   Final Result   Multiple old compression fractures status post vertebral body enhancement T10   and T12      No acute fracture definitely identified         XR CHEST PORTABLE   Final Result   No acute cardiopulmonary disease      Stable interstitial opacities, likely fibrotic.                  Assessment/Plan:    Active Hospital Problems    Diagnosis     Centrilobular emphysema ([address] [J43.2]     Pulmonary fibrosis ([address] [J84.10]     Lung nodules [R91.8]     DM (diabetes mellitus), secondary uncontrolled ([address] [E13.65]     Elevated d-dimer [R79.89]     Elevated ferritin level [R79.89]     Calculus of gallbladder without cholecystitis without obstruction [K80.20]     Acute respiratory failure due to COVID-19 (HCC) [U07.1, J96.00]     Atrial fibrillation with RVR (HCC) [I48.91]     Chronic kidney disease (CKD), stage IV (severe) (HCC) [N18.4]     Chronic lymphocytic leukemia (HCC) [C91.10]        Acute hypoxic resp failure- due to covid-19  Date of symptom onset - September 27   Date of diagnosis - 10/4/21  Date of admission - 10/10/21  Vaccinated - both doses of Moderna approx 6 months ago  CXR - fibrotic changes  Troponin and EKG -<0.01, sinus, RBBB, PVCs  Supplemental oxygen : -  currently on 1-2LPM to maintain sats >94%, wean as tolerated  Labs : ferritin, daily D-dimer, CRP for prognostication  Steroids : IV Solu-Medrol 40 mg twice daily   Antiviral : IV remdesivir - unable to use as with CKD  IV antibiotics : hold, low suspicion of concomitant bacterial pneumonia but will start if needed, follow fever curve as WBC not helpful in setting of chronic lymphocytosis  Anticoagulation : heparin  IV fluids :  Avoided overzealous fluids  Intubation/MV : discussed with patient, patient agreeable if needed  CRP peak at 57, did not meet Tocilizumab criteria of CRP >75   Can not

## 2021-10-17 NOTE — CONSULTS
INPATIENT PULMONARY CRITICAL CARE CONSULT NOTE      Chief Complaint/Referring Provider:  Patient is being seen at the request of Dr. Yulisa Street for a consultation for COVID 19 pneumonia requiring 10 L oxygen now      Presenting HPI: Patient admitted to the hospital with increasing shortness of breath    As per ER provider-This is a  81 y.o. male who presents via personal transport complaining of sinus congestion and productive cough. The patient has a past medical history of diabetes, CKD, essential tremor, and lung cancer. The patient is not currently in treatment he states he has an upcoming appointment with his pulmonologist to discuss treatment options. He states beginning on Monday he had worsening sinus congestion with a productive cough however he has since not had a cough since then. He denies shortness of breath. He states his primary complaint is sinus congestion. He has not taken anything for his symptoms. He denies fever, chills, lightheadedness, dizziness, chest pain, abdominal pain, nausea, vomiting, or diarrhea. He does feel his symptoms are improving.    During the course of hospitalization patient oxygenation has not improved and patient has slightly worsening of oxygen as compared to yesterday, patient now on 10 L of high flow oxygen to maintain his oxygen saturation 95% when evaluated this morning, patient also went into A. fib with RVR for which patient was started on Cardizem infusion and patient was started on Cardizem drip but patient's heart rate was not improving with the maximum dose of Cardizem infusion as per nursing and for that reason patient was started on oral Cardizem and digoxin and as per EP permissive tachycardia is acceptable as long as the target heart rate is less than 150 and if patient's heart rate does not improve then there is a thought process of AKASH cardioversion; patient did have a bit of dizziness while getting out of the bed yesterday as per documentation, patient continues to have some shortness of breath along with that patient had some choking on his coffee this morning, patient has now converted to sinus rhythm, patient is afebrile, patient is hemodynamic was borderline normal, patient's blood sugars are not controlled, patient's urine output has been on the lower side, patient's cumulative fluid balance was +3.1 L, no other pertinent review of system of concern     Patient Active Problem List    Diagnosis Date Noted    Centrilobular emphysema ([address] 10/17/2021    Pulmonary fibrosis ([address] 10/17/2021    Lung nodules 10/17/2021    DM (diabetes mellitus), secondary uncontrolled ([address] 10/17/2021    Elevated d-dimer 10/17/2021    Elevated ferritin level 10/17/2021    Calculus of gallbladder without cholecystitis without obstruction 10/17/2021    Acute respiratory failure due to COVID-19 [facility]) 10/16/2021    Atrial fibrillation with RVR ([address] 10/16/2021    COVID-19 10/10/2021    Renal calculi 06/01/2021    Chronic kidney disease (CKD), stage IV (severe) (HCC) 09/22/2020    Chronic lymphocytic leukemia ([address] 01/15/2020    Essential tremor 11/14/2019    DM (diabetes mellitus), secondary, uncontrolled, w/neurologic complic ([address] 84/92/2983    HTN (hypertension), benign 11/14/2019    Dyslipidemia 11/14/2019    Lymphocytosis 07/01/2019    Chronic kidney disease, stage III (moderate) ([address] 07/01/2019    Right wrist pain 06/11/2019    Abrasion of right forearm 06/11/2019    Abrasion of left forearm 06/11/2019    Type 2 diabetes mellitus with hyperglycemia ([address] 12/21/2017    Bradycardia 07/08/2014    Diabetes mellitus ([address] 10/03/2012    Murmur, cardiac 10/03/2012    Thoracic compression fracture ([address] 10/03/2012    MVP (mitral valve prolapse) 10/02/2012    Hyperlipemia 10/02/2012    BPH (benign prostatic hyperplasia) 10/02/2012    IGT (impaired glucose tolerance) 69/20/4248    Dysmetabolic syndrome X 16/22/8287    Benign essential tremor 10/02/2012    HTN (hypertension) 10/02/2012    Hematuria 10/02/2012    Renal insufficiency 10/02/2012       Past Medical History:   Diagnosis Date    Back pain     Cancer [facility]     skin    Centrilobular emphysema ([address] 10/17/2021    Chronic kidney disease     COVID-19 10/10/2021    Diabetes mellitus ([address]     Essential tremor     History of BPH     Hypercholesteremia     Renal insufficiency     Type 2 diabetes mellitus with hyperglycemia ([address] 2017        Past Surgical History:   Procedure Laterality Date    CATARACT REMOVAL      CYSTOSCOPY      HERNIA REPAIR      KIDNEY STONE REMOVAL Left 2021    LEFT PERCUTANEOUS NEPHROLITHOTOMY performed by Moe Aaron MD at [address]  2012    KYPHOPLASTY T10 AND T12    NEPHROSTOMY Left 2021    cystoscopy, ramirez catheter placement performed by Moe Aaron MD at [address] [facility] NEPHROSTOMY Left 2021    NEPHROSTOMY PLACEMENT performed by Shalini Dey MD at [address] [place] [address] Drive,[facility] [address] Left 2021    cystoscopy, ramirez catheter placement (Left Bladder) nephrostomy placement left pelvis left percutaneous nephrolithotomy        Family History   Problem Relation Age of Onset    Heart Disease Father     Heart Disease Sister         Social History     Tobacco Use    Smoking status: Former Smoker     Packs/day: 1.00     Years: 10.00     Pack years: 10.00     Types: Cigarettes     Quit date: 3/19/1978     Years since quittin.6    Smokeless tobacco: Never Used    Tobacco comment: quit 25 years ago   Substance Use Topics    Alcohol use: Yes     Comment: on occasion        No Known Allergies            Physical Exam:  Blood pressure 107/61, pulse 76, temperature 97.6 °F (36.4 °C), resp. rate 22, height 5' 8\" (1.727 m), weight 136 lb 11 oz (62 kg), SpO2 95 %.'       In-person bedside physical examination deferred.   Pursuant to the emergency declaration under the [facility] [place], 1657 waiver authority and the [employer] Supplemental Appropriations Act, this clinical encounter was conducted to provide necessary medical care. (Also consistent with new provisions and guidance offered by [facility] on March 18, 2020 in setting of COVID 19 outbreak and in order to preserve personal protective equipment in accordance with the flexibilities announced by CMS on March 30, 2020)   References: [email or web address]://[email or web address] [facility]/Portals/0/Resources/COVID-19/3_18%20Telemed%20Guidance%20Updated%20March%2018. pdf?[email or web address]                      [email or web address]://[facility]. [facility]/Portals/0/Resources/COVID-19/3_18%20Telemed%20Guidance%20Updated%20March%2018. pdf?mjx=[email or web address]                      [email or web address] pdf                 Results:  CBC:   Recent Labs     10/16/21  0052 10/17/21  0518   WBC 39.1* 35.8*   HGB 12.9* 12.1*   HCT 39.7* 36.7*   MCV 87.9 86.5    193     BMP:   Recent Labs     10/16/21  0050 10/17/21  0518    136   K 5.2* 5.3*    103   CO2 17* 20*   BUN 68* 71*   CREATININE 2.2* 2.5*     LIVER PROFILE:   Recent Labs     10/16/21  0050 10/17/21  0518   AST 58* 43*   ALT 51* 35   BILITOT 1.5* 1.2*   ALKPHOS 88 99       Imaging:  I have reviewed radiology images personally. XR THORACIC SPINE (MIN 4 VIEWS)   Final Result   Multiple old compression fractures status post vertebral body enhancement T10   and T12      No acute fracture definitely identified         XR CHEST PORTABLE   Final Result   No acute cardiopulmonary disease      Stable interstitial opacities, likely fibrotic. CT CHEST WO CONTRAST    Result Date: 10/6/2021  EXAMINATION: CT OF THE CHEST WITHOUT CONTRAST 10/6/2021 10:24 pm TECHNIQUE: CT of the chest was performed without the administration of intravenous contrast. Multiplanar reformatted images are provided for review.  Dose modulation, iterative reconstruction, and/or weight based adjustment of the mA/kV was utilized to reduce the radiation dose to as low as reasonably achievable. COMPARISON: 01/03/2018 HISTORY: ORDERING SYSTEM PROVIDED HISTORY: hx of lung cancer with sob TECHNOLOGIST PROVIDED HISTORY: Reason for exam:->hx of lung cancer with sob Decision Support Exception - unselect if not a suspected or confirmed emergency medical condition->Emergency Medical Condition (MA) Reason for Exam: Pain when coughing since Monday; known lung cancer for over a year Acuity: Acute Type of Exam: Initial FINDINGS: Mediastinum:   There is calcified plaque throughout the aorta which is mildly dilated and ectatic but unchanged. The pulmonary vessels are normal size. There are small lymph nodes scattered in the AP window mediastinum measuring less than 1 cm. Upper Abdomen: There is a small stone along the upper pole left kidney which is more prominent. There calcifications in the gallbladder which are unchanged. The adrenals are normal.  The spleen is mildly enlarged but unchanged. Lungs/pleura: The lungs are mildly hyperinflated and emphysematous. There is mild scarring along the apices which is unchanged. There is hazy subpleural interstitial and ground-glass opacities along the lung bases posterolaterally which is unchanged. There is some mild subpleural opacity extending into the superior segment right lower lobe which is more prominent. No effusion is seen. There is 9 mm pleural base nodule along the right lung base posteriorly and a 6 mm pleural base nodule left lung base posteriorly which is unchanged. No new nodule seen. There are reticulonodular densities in the lingula inferiorly which is unchanged. There is mild linear scarring along the left lung base which is unchanged no. Soft Tissues/Bones: There are moderate to severe compression fractures throughout the mid to lower thoracic spine which are grossly unchanged.  There is methylmethacrylate and several vertebra which are unchanged consistent with previous vertebroplasties no retropulsed or displaced fragment can be seen. Chronic obstructive lung changes with bibasilar fibrosis and scarring and bronchiectasis with subpleural interstitial opacities which is more prominent on the right and is slightly increased which could be due to progressive fibrosis or early pneumonitis recommend short-term follow-up Small pleural-based nodules along the lung bases posteriorly which are unchanged with no new nodule seen Mildly dilated and atherosclerotic thoracic aorta with moderate coronary artery calcifications which is unchanged. Small left renal stone. Cholelithiasis which is unchanged. Results for Kalpesh Johnson (MRN [id number]) as of 10/17/2021 11:46   Ref. Range 10/16/2021 16:20 10/16/2021 19:30 10/17/2021 05:18 10/17/2021 07:43 10/17/2021 11:34   Sodium Latest Ref Range: 136 - 145 mmol/L   136     Potassium Latest Ref Range: 3.5 - 5.1 mmol/L   5.3 (H)     Chloride Latest Ref Range: 99 - 110 mmol/L   103     CO2 Latest Ref Range: 21 - 32 mmol/L   20 (L)     BUN Latest Ref Range: 7 - 20 mg/dL   71 (H)     Creatinine Latest Ref Range: 0.8 - 1.3 mg/dL   2.5 (H)     Anion Gap Latest Ref Range: 3 - 16    13     GFR Non- Latest Ref Range: >60    25 (A)     GFR  Latest Ref Range: >60    30 (A)     Glucose Latest Ref Range: 70 - 99 mg/dL   201 (H)     POC Glucose Latest Ref Range: 70 - 99 mg/dl 318 (H) 300 (H)  194 (H) 272 (H)   Calcium Latest Ref Range: 8.3 - 10.6 mg/dL   8.8     Total Protein Latest Ref Range: 6.4 - 8.2 g/dL   6.5       Results for Kalpesh Johnson (MRN [id number]) as of 10/17/2021 11:46   Ref.  Range 10/10/2021 10:09 10/11/2021 07:05 10/13/2021 07:08 10/13/2021 13:28   CRP Latest Ref Range: 0.0 - 5.1 mg/L 51.7 (H) 57.8 (H) 27.9 (H) 26.2 (H)   Pro-BNP Latest Ref Range: 0 - 449 pg/mL 529 (H)      Troponin Latest Ref Range: <0.01 ng/mL 0.01        Results for Munir Bhargav Zee (MRN [id number]) as of 10/17/2021 11:46   Ref. Range 10/13/2021 07:07 10/14/2021 06:48 10/15/2021 06:18 10/16/2021 05:07 10/17/2021 05:18   D-Dimer, Quant Latest Ref Range: 0 - 229 ng/mL  (H) 429 (H) 518 (H) 1015 (H) 1166 (H)   Results for Aline Alcantar (MRN [id number]) as of 10/17/2021 11:46   Ref. Range 10/10/2021 10:48   SARS-CoV-2, NAAT Latest Ref Range: Not Detected  DETECTED (AA)     Results for Aline Alcantar (MRN [id number]) as of 10/17/2021 11:46   Ref. Range 10/10/2021 10:09   Ferritin Latest Ref Range: 30.0 - 400.0 ng/mL 622.5 (H)     Peripheral blood for flow cytometry:              Monoclonal B-lymphoid population identified consistent with        underlying B-cell lymphoma.                No atypical expression in T-lymphoid population. Echocardiogram: None in Epic   PFT:None in Epic         Assessment:  Active Problems:    Chronic kidney disease (CKD), stage IV (severe) (HCC)    Chronic lymphocytic leukemia (HCC)    Acute respiratory failure due to COVID-19 [facility])    Atrial fibrillation with RVR (HCC)    Centrilobular emphysema (HCC)    Pulmonary fibrosis (HCC)    Lung nodules    DM (diabetes mellitus), secondary uncontrolled (HCC)    Elevated d-dimer    Elevated ferritin level    Calculus of gallbladder without cholecystitis without obstruction  Resolved Problems:    * No resolved hospital problems.  *          Plan:   · O2 supplementation to keep SaO2 between 90-94% ONLY  · Please titrate oxygen as per above parameters   · Please monitor for any worsening hypoxemia and respiratory failure  · IV solumedrol to continue  · Patient's lantus insulin was changed to 15 units BID  · Titration of lantus insulin as per BGM  · Patient is now in sinus rhythm on PO cardizem and digoxin  · Echocardiogram being contemplated for tomorrow  · PO Eliquis to continue  · Monitor for any bleeding  · Patient cannot be given Barcinitinib secondary to renal failure and CLL (risk to benefit ratio is favor of not giving)  · Lekocytosis secondary to CLL  · Symbicort started   · Trend the inflammatory markers   · Monitor I/O and BMP  · Correct electrolytes on PRN basis   · PUD prophylaxis started    Patient's clinical status is precarious and has potential for decompensation and needs to be monitored closely in progressive care unit    IM to have advanced care planning discussions with the patient,if deemed appropriate;to discuss the code status-if patient has worsening respiratory status and needs mechanical ventilatory support -it may be prolonged intubation and also may not be able to be extubated given the underlying COPD /pulmonary fibrosis ,in addition to COVID 19        Electronically signed by:  Migdalia Pallas, MD    10/17/2021    12:02 PM.

## 2021-10-17 NOTE — PROGRESS NOTES
Pt alert, assisted to bedside commode, Pt O2 sat came down to 81% on 8 L Hi alayna NC. O2 turned up to 10 L Hi alayna- sat 85-86 %. Assisted back in bed kept warm and comfortable on Right side lying position. Will continue to monitor.  MKRN

## 2021-10-17 NOTE — PROGRESS NOTES
[address]   Daily Progress Note    Admit Date:  10/10/2021  HPI:    Chief Complaint   Patient presents with    Fall     Hx of parkinsons. increasing amount of falls recently. Pt reports he just falls over.  Shortness of Breath     Pt denies resp Hx. 87% on room air on arrival        Interval history: Fay Coffey is being followed for afib with rvr. He is being treated for COVID-19 pneumonia     Subjective:  Mr. Evelia Sapp continues with shortness of breath. Tells me he chocked on his coffee this am.   No chest pain.    Remains on 10L     Objective:   /61   Pulse 76   Temp 97.6 °F (36.4 °C)   Resp 22   Ht 5' 8\" (1.727 m)   Wt 136 lb 11 oz (62 kg)   SpO2 95%   BMI 20.78 kg/m²       Intake/Output Summary (Last 24 hours) at 10/17/2021 1052  Last data filed at 10/17/2021 0416  Gross per 24 hour   Intake 930 ml   Output 590 ml   Net 340 ml       NYHA: IV    Physical Exam:  General:  Awake, alert, NAD, thin, dry cough   Skin:  Warm and dry  Neck:  JVD<8  Chest:  Very dim to auscultation, coughs with inspiration   Telemetry: converted out of afib on 10/16/21 at 1222pm; remains in NSR rates 70-80's  Cardiovascular:  RRR S1S2, no m/r/g   Abdomen:  Soft, nontender, +bowel sounds  Extremities:  No  bilateral lower extremity edema    Medications:    apixaban  2.5 mg Oral BID    digoxin  0.0625 mg Oral Q48H    dilTIAZem  120 mg Oral Daily    insulin glargine  23 Units SubCUTAneous Nightly    influenza virus vaccine  0.5 mL IntraMUSCular Prior to discharge    insulin lispro  0-18 Units SubCUTAneous TID     insulin lispro  0-9 Units SubCUTAneous Nightly    aspirin  81 mg Oral Daily    escitalopram  10 mg Oral Daily    rosuvastatin  40 mg Oral Daily    tamsulosin  0.4 mg Oral Daily    sodium chloride flush  5-40 mL IntraVENous 2 times per day    methylPREDNISolone  40 mg IntraVENous Q12H      dilTIAZem Stopped (10/16/21 1225)    sodium chloride      dextrose         Lab Data:  CBC:   Recent Labs     10/16/21  0052 10/17/21  0518   WBC 39.1* 35.8*   HGB 12.9* 12.1*    193     BMP:    Recent Labs     10/16/21  0050 10/17/21  0518    136   K 5.2* 5.3*   CO2 17* 20*   BUN 68* 71*   CREATININE 2.2* 2.5*     INR:  No results for input(s): INR in the last 72 hours. BNP:  No results for input(s): PROBNP in the last 72 hours. No results found for: LVEF, LVEFMODE    Testing:      Active Problems:    Chronic kidney disease (CKD), stage IV (severe) (HCC)    Chronic lymphocytic leukemia (HCC)    COVID-19    Acute respiratory failure due to COVID-19 [facility])    Atrial fibrillation with RVR (HCC)  Resolved Problems:    * No resolved hospital problems.  *      Assessment:  afib with rvr- currently in NSR   COVID 19 pneumonia  Acute respiratory failure  LEONOR on CKD 4  HTN  HLD  Dm  Parkinson's  Hx of CLL    Plan:  Continue eliquis 2.5mg BID   Echocardiogram has been ordered   Diltiazem 120mg daily  Off of dilt gtt   Digoxin- very low dose every 48 hours       Cj Human, APRN - CNP, CNP, 10/17/2021, 11:21 AM

## 2021-10-17 NOTE — PROGRESS NOTES
Pts mental status is shape and pt thinks independently and is able to express his thoughts and desires. Pt participates with his adls. Pt is watching tv, eating and drinking at this time. hf oxygen in place nc.

## 2021-10-17 NOTE — PROGRESS NOTES
Pt appears rested and states he feels better than this a.m. pt is sitting up in bed watching tv. Pt is still on hf 15l and nrb 15l. 97% o2sats. Pt drops with removal of nrb. Pt also drops with exertion. Pt positioned for comfort and adequate ventilation. All bony prominences are cushioned with pillows. Turning wedged used to aide in wound prevention and to keep pt from sliding down in the bed. Pt has call light and phone. Has his table within reach and his cell and water. Bed lowest and locked, safety alarm in place and active.

## 2021-10-17 NOTE — FLOWSHEET NOTE
10/16/21 2016   Assessment   Charting Type Shift assessment   Neurological   Level of Consciousness Alert (0)   Orientation Level Oriented to place;Oriented to time;Oriented to situation;Oriented to person   Cognition Appropriate judgement; Appropriate attention/concentration; Appropriate for developmental age; Follows commands   Language Appropriate for developmental age   [place] Coma Scale   Eye Opening 4   Best Verbal Response 5   Best Motor Response 6   [place] Coma Scale Score 15   HEENT   HEENT (WDL) WDL   Respiratory   Respiratory Pattern Tachypneic   Respiratory Depth Normal   Respiratory Quality/Effort Dyspnea with exertion;Dyspnea at rest   Chest Assessment Chest expansion symmetrical   Breath Sounds   Right Upper Lobe Diminished   Right Middle Lobe Diminished   Right Lower Lobe Diminished   Left Upper Lobe Diminished   Left Lower Lobe Diminished   Cardiac   Cardiac Regularity Irregular   Heart Sounds S1, S2   Cardiac Rhythm Atrial fib; Sinus rhythm  (Pt in and out A_FIB ON TELE MONITOR)   Rhythm Interpretation   Pulse 65   Cardiac Monitor   Telemetry Monitor On Yes   Telemetry Audible Yes   Telemetry Alarms Set Yes   Telemetry Box Number 33   Gastrointestinal   Abdominal (WDL) WDL   Peripheral Vascular   Peripheral Vascular (WDL) WDL   Skin Color/Condition   Skin Color Appropriate for ethnicity; Ecchymosis   Skin Condition/Temp Dry;Poor turgor; Warm   Skin Integrity   Skin Integrity Abrasion;Blister;Bruising; Redness; Tear   Location scattered   Skin Integrity Site 2   Skin Integrity Location 2 Bruising; Redness; Tear   Location 2 Left elbow   Preventative Dressing Yes   Skin Integrity Site 3   Skin Integrity Location 3 Redness    Location 3 Coccyx   Preventative Dressing Patient Refused  (PT HAS moisture cream as preventative)   Musculoskeletal   RUE Weakness   LUE Weakness   RL Extremity Weakness   LL Extremity Weakness   Genitourinary   Genitourinary (WDL) WDL   Flank Tenderness No   Suprapubic Tenderness No Dysuria No   Anus/Rectum   Anus/Rectum (WDL) WDL   Psychosocial   Patient Behaviors Calm; Cooperative   pt resting quietly in bed, alert and oriented. SHIFT assessment done, Pt updated with POC, verbalized understanding. No concerns voiced. SAFETY/fall prevention maintained. PERSONAL belongings and call light within reach. Will continue to monitor.  BRENNA

## 2021-10-18 NOTE — PROGRESS NOTES
[address]   Daily Progress Note    Admit Date:  10/10/2021  HPI:    Chief Complaint   Patient presents with    Fall     Hx of parkinsons. increasing amount of falls recently. Pt reports he just falls over.  Shortness of Breath     Pt denies resp Hx. 87% on room air on arrival        Interval history: Domniic Silveira is being followed for afib with rvr. He is being treated for COVID-19 pneumonia     Subjective:  Mr. Shakila Jean oxygen requirements are worse today, he is requiring 15L NC with non-re breather. He aspirated on coffee yesterday morning. Tells me his breathing is fine.      Objective:   /66   Pulse 83   Temp 97.7 °F (36.5 °C) (Oral)   Resp 24   Ht 5' 8\" (1.727 m)   Wt 128 lb 15.5 oz (58.5 kg)   SpO2 94%   BMI 19.61 kg/m²       Intake/Output Summary (Last 24 hours) at 10/18/2021 1311  Last data filed at 10/18/2021 1046  Gross per 24 hour   Intake 980 ml   Output 1400 ml   Net -420 ml       NYHA: IV    Physical Exam:  General:  Awake, alert, NAD, thin, dry cough   Skin:  Warm and dry  Neck:  JVD<8  Chest:  Very dim to auscultation, coughs with inspiration   Telemetry: converted out of afib on 10/16/21 at 1222pm; remains in NSR rates 80  Cardiovascular:  RRR S1S2, no m/r/g   Abdomen:  Soft, nontender, +bowel sounds  Extremities:  No  bilateral lower extremity edema    Medications:    budesonide-formoterol  2 puff Inhalation BID    insulin glargine  15 Units SubCUTAneous BID    pantoprazole  40 mg Oral QAM AC    apixaban  2.5 mg Oral BID    digoxin  0.0625 mg Oral Q48H    dilTIAZem  120 mg Oral Daily    influenza virus vaccine  0.5 mL IntraMUSCular Prior to discharge    insulin lispro  0-18 Units SubCUTAneous TID WC    insulin lispro  0-9 Units SubCUTAneous Nightly    aspirin  81 mg Oral Daily    escitalopram  10 mg Oral Daily    rosuvastatin  40 mg Oral Daily    tamsulosin  0.4 mg Oral Daily    sodium chloride flush  5-40 mL IntraVENous 2 times per day    methylPREDNISolone  40 mg IntraVENous Q12H      sodium chloride      dextrose         Lab Data:  CBC:   Recent Labs     10/16/21  0052 10/17/21  0518 10/18/21  0718   WBC 39.1* 35.8* 34.0*   HGB 12.9* 12.1* 13.2*    193 222     BMP:    Recent Labs     10/16/21  0050 10/17/21  0518 10/18/21  0718    136 138   K 5.2* 5.3* 5.1   CO2 17* 20* 18*   BUN 68* 71* 61*   CREATININE 2.2* 2.5* 2.0*     INR:  No results for input(s): INR in the last 72 hours. BNP:  No results for input(s): PROBNP in the last 72 hours. No results found for: LVEF, LVEFMODE    Testing:      Principal Problem:    COVID-19  Active Problems:    Chronic kidney disease (CKD), stage IV (severe) (HCC)    Chronic lymphocytic leukemia (HCC)    Atrial fibrillation with RVR (HCC)    Centrilobular emphysema (HCC)    Pulmonary fibrosis (HCC)    Lung nodules    DM (diabetes mellitus), secondary uncontrolled (HCC)    Calculus of gallbladder without cholecystitis without obstruction  Resolved Problems:    * No resolved hospital problems. *      Assessment:  afib with rvr- remains in NSR   COVID 19 pneumonia  Acute respiratory failure  LEONOR on CKD 4  HTN  HLD  Dm  Parkinson's  Hx of CLL    Plan:  Continue eliquis 2.5mg BID   Echocardiogram has been ordered   Diltiazem 120mg daily  Digoxin- very low dose every 48 hours     Cardiology will sign off at this time, please call with questions.      Valeria Singh, APRN - CNP, CNP, 10/18/2021, 1:11 PM

## 2021-10-18 NOTE — CARE COORDINATION
Followed up with [address] to check on status of referral. Per Ofedward[facility] Echavarria in admissions they have accepted patient and can accept once oxygen requirement is lower. Patient is currently on 15 liters high flow. If unable to wean down O2 requirement he may need LTACH referral. Will discuss with MD to see if he is agreeable to an [facility] referral and would want writer to discuss with patient.

## 2021-10-18 NOTE — PROGRESS NOTES
/63   Pulse 81   Temp 97.6 °F (36.4 °C) (Oral)   Resp 22   Ht 5' 8\" (1.727 m)   Wt 128 lb 15.5 oz (58.5 kg)   SpO2 94%   BMI 19.61 kg/m² on 15L high flow O2 plus NRB mask, continuous pulse ox in place. Pt resting quietly in bed. Pt A&O x4.  Pt denies pain or discomfort at this time. Pt with intermittent dyspnea at rest, dyspnea with exertion. Lungs diminished through out with crackles in RML and bases. Pt with non productive cough. Incentive spirometer at bedside. Writer encouraged pt to use, explained importance with pt verbalizing understanding. NSR on tele. Writer encouraged pt to turn and reposition frequently, encouraged to prone if able. Pt is able to turn and reposition self independently, well. BLE elevated off bed onto pillow with heels floated off pillow. Pt denies any needs at this time. Bedside table, call light, fluids within reach. Bed alarm in place and activated. Pt instructed to call out for any needs and assistance. Will continue to monitor.   Leatha Marques RN  10/18/2021

## 2021-10-18 NOTE — PROGRESS NOTES
Writer noticed coughing episode while pt took a drink, pt stated took too much of a drink too fast, O2 sats had dropped into 60's, NRB was reapplied at that time with pt recovering O2 sats 90's. Will continue to monitor. Writer encouraged pt to notify staff to assist with drinking and eating as needed.   Nina Mack RN  10/18/2021

## 2021-10-18 NOTE — PROGRESS NOTES
Tele box #57 switched to #41 due to tele box not working; pt had spilled water, unsure if tele box was affected by this.   Cesar Gloria RN  10/18/2021

## 2021-10-18 NOTE — PROGRESS NOTES
BP (!) 123/59   Pulse 82   Temp 97.9 °F (36.6 °C) (Oral)   Resp 24   Ht 5' 8\" (1.727 m)   Wt 128 lb 15.5 oz (58.5 kg)   SpO2 96%   BMI 19.61 kg/m²  on 15L high flow plus NRB mask in place; foam ear protectors in place. Pt resting quietly in bed. Pt denies pain at this time. Pt remains with intermittent dyspnea at rest, dyspnea with exertion. Lungs diminished through out with crackles in bases. pt with non productive cough. NSR on tele. Pt able to turn self independently, assisted with boost as needed. BLE remain elevated off bed onto pillow. Pt denies any needs at this time. Bedside table, fluids, call light, telephone within reach. Pt instructed to call out for any needs and assistance. Will continue to monitor.   Ervin Martel RN  10/18/2021

## 2021-10-18 NOTE — PROGRESS NOTES
Physical and Occupational Therapy    Chart reviewed, attempted to see pt for follow up treatment this date; will hold PT and OT as pt on 15 L O 2 non-rebreather.     Mary Ann Robert, PT

## 2021-10-18 NOTE — PROGRESS NOTES
Hospitalist Progress Note      PCP: No primary care provider on file. Date of Admission: 10/10/2021    Chief Complaint: Recurrent falls w/ SOB. Subjective: no new c/o. Medications:  Reviewed    Infusion Medications    sodium chloride      dextrose       Scheduled Medications    budesonide-formoterol  2 puff Inhalation BID    insulin glargine  15 Units SubCUTAneous BID    pantoprazole  40 mg Oral QAM AC    apixaban  2.5 mg Oral BID    digoxin  0.0625 mg Oral Q48H    dilTIAZem  120 mg Oral Daily    influenza virus vaccine  0.5 mL IntraMUSCular Prior to discharge    insulin lispro  0-18 Units SubCUTAneous TID WC    insulin lispro  0-9 Units SubCUTAneous Nightly    aspirin  81 mg Oral Daily    escitalopram  10 mg Oral Daily    rosuvastatin  40 mg Oral Daily    tamsulosin  0.4 mg Oral Daily    sodium chloride flush  5-40 mL IntraVENous 2 times per day    methylPREDNISolone  40 mg IntraVENous Q12H     PRN Meds: guaiFENesin-dextromethorphan, levalbuterol, perflutren lipid microspheres, prochlorperazine, sodium chloride flush, sodium chloride, polyethylene glycol, acetaminophen **OR** acetaminophen, glucose, dextrose, glucagon (rDNA), dextrose      Intake/Output Summary (Last 24 hours) at 10/18/2021 0916  Last data filed at 10/18/2021 7626  Gross per 24 hour   Intake 1090 ml   Output 1400 ml   Net -310 ml       Physical Exam Performed:    /67   Pulse 71   Temp 98 °F (36.7 °C) (Oral)   Resp 24   Ht 5' 8\" (1.727 m)   Wt 128 lb 15.5 oz (58.5 kg)   SpO2 90%   BMI 19.61 kg/m²     General appearance: No apparent distress, appears stated age and cooperative. HEENT: Pupils equal, round, and reactive to light. Conjunctivae/corneas clear. Neck: Supple, with full range of motion. No jugular venous distention. Trachea midline. Respiratory:  Normal respiratory effort. Clear to auscultation, bilaterally without Rales/Wheezes/Rhonchi.   Cardiovascular: Regular rate and rhythm with normal S1/S2 without murmurs, rubs or gallops. Abdomen: Soft, non-tender, non-distended with normal bowel sounds. Musculoskeletal: No clubbing, cyanosis or edema bilaterally. Full range of motion without deformity. Skin: Skin color, texture, turgor normal.  No rashes or lesions. Neurologic:  Neurovascularly intact without any focal sensory/motor deficits. Cranial nerves: II-XII intact, grossly non-focal.  Psychiatric: Alert and oriented, thought content appropriate, normal insight  Capillary Refill: Brisk,< 3 seconds   Peripheral Pulses: +2 palpable, equal bilaterally       Labs:   Recent Labs     10/16/21  0052 10/17/21  0518 10/18/21  0718   WBC 39.1* 35.8* 34.0*   HGB 12.9* 12.1* 13.2*   HCT 39.7* 36.7* 40.6    193 222     Recent Labs     10/16/21  0050 10/17/21  0518 10/18/21  0718    136 138   K 5.2* 5.3* 5.1    103 105   CO2 17* 20* 18*   BUN 68* 71* 61*   CREATININE 2.2* 2.5* 2.0*   CALCIUM 9.1 8.8 8.7     Recent Labs     10/16/21  0050 10/17/21  0518 10/18/21  0718   AST 58* 43* 39*   ALT 51* 35 38   BILITOT 1.5* 1.2* 1.6*   ALKPHOS 88 99 95     No results for input(s): INR in the last 72 hours. No results for input(s): Amna Horsfall in the last 72 hours.     Urinalysis:      Lab Results   Component Value Date    NITRU Negative 10/10/2021    WBCUA 0-2 10/10/2021    BACTERIA 2+ 01/02/2020    RBCUA None seen 10/10/2021    BLOODU LARGE 10/10/2021    SPECGRAV 1.025 10/10/2021    GLUCOSEU Negative 10/10/2021       Consults:    IP CONSULT TO HOSPITALIST  IP CONSULT TO ONCOLOGY  IP CONSULT TO INFECTIOUS DISEASES  IP CONSULT TO CARDIOLOGY  IP CONSULT TO PULMONOLOGY      Assessment/Plan:    Active Hospital Problems    Diagnosis     Centrilobular emphysema ([facility] [address] [J43.2]     Pulmonary fibrosis ([facility] [address] [J84.10]     Lung nodules [R91.8]     DM (diabetes mellitus), secondary uncontrolled ([facility] [address] [E13.65]     Calculus of gallbladder without cholecystitis without obstruction [K80.20]     Atrial Prophylaxis: Eliquis    Recent Labs     10/16/21  0052 10/17/21  0518 10/18/21  0718    193 222     Diet: Adult Oral Nutrition Supplement; Diabetic Oral Supplement  ADULT DIET; Dysphagia - Minced and Moist; 4 carb choices (60 gm/meal)  Code Status: Limited      PT/OT Eval Status: seen w/ recs for SNF. Dispo - Possibly to SNF Tues/Wed 19/20 October pending clinical course and placement decision.      Meena Gallegos MD

## 2021-10-18 NOTE — PLAN OF CARE
Problem: Gas Exchange - Impaired  Goal: Absence of hypoxia  Intervention: Monitor oxygen saturation  Note: Continuous pulse ox in place. Pt on 15L high flow with NRB mask this shift. Problem: Breathing Pattern - Ineffective  Goal: Ability to achieve and maintain a regular respiratory rate  Note: Pt with intermittent dyspnea at rest, dyspnea with any exertion. Problem: Body Temperature -  Risk of, Imbalanced  Goal: Ability to maintain a body temperature within defined limits  Intervention: Body temperature monitoring  Note: Pt afebrile this shift.

## 2021-10-18 NOTE — PROGRESS NOTES
Progressive Care Progress Note    Patient: Anabell Patterson MRN: [id number]  Date of  Admission: 10/10/2021   YOB: 1935  Age: 81 y.o. Sex: male    Unit: [address] [address] PCU  Room/Bed: [address] [place] [address] Attending Physician: Idalia Lovett MD   Admitting Physician: Smooth Mccormack          Chief Complaint/Referring Provider:  Patient is being seen at the request of Dr. Roberta Sandoval for a consultation for COVID 19 pneumonia requiring 10 L oxygen now      Presenting HPI: Patient admitted to the hospital with increasing shortness of breath    Subjective: Still requiring high amounts of oxygen  No chest pains  No hemoptysis  No feeling congested    ROS:A comprehensive review of systems was negative except for: above    Objective: Intake and Output:   Current Shift:   10/18 0701 - 10/18 1500  In: 130 [P.O.:120; I.V.:10]  Out: -   Last three shifts:   10/17 0701 - 10/18 0700  In: 1090 [P.O.:1080; I.V.:10]  Out: 1400 [Urine:1400]        Hemodynamic parameters for last 24 hours:  [unfilled]    Physical Exam:   Patient Vitals for the past 24 hrs:   BP Temp Temp src Pulse Resp SpO2 Weight   10/18/21 1243 130/66 97.7 °F (36.5 °C) Oral 83 24 94 %    10/18/21 1000 126/63 97.6 °F (36.4 °C) Oral 81 22 94 %    10/18/21 0746     24 90 %    10/18/21 0430       128 lb 15.5 oz (58.5 kg)   10/18/21 0330 128/67 98 °F (36.7 °C) Oral 71 20 94 %    10/18/21 0018 132/70 97.7 °F (36.5 °C) Oral 76 20 97 %    10/17/21 2100 133/61 98.2 °F (36.8 °C) Oral 71 24 98 %    10/17/21 2046      (!) 89 %    10/17/21 1645 117/60         10/17/21 1545 (!) 109/56         10/17/21 1345 130/64   83  (!) 87 %         Physical Exam  Vitals and nursing note reviewed. Constitutional:       General: He is not in acute distress. Appearance: Normal appearance. HENT:      Head: Normocephalic and atraumatic.       Right Ear: External ear normal.      Left Ear: External ear normal.      Nose: Nose normal. Mouth/Throat:      Mouth: Mucous membranes are moist.      Pharynx: Oropharynx is clear. Eyes:      General:         Right eye: No discharge. Left eye: No discharge. Extraocular Movements: Extraocular movements intact. Conjunctiva/sclera: Conjunctivae normal.      Pupils: Pupils are equal, round, and reactive to light. Cardiovascular:      Rate and Rhythm: Normal rate and regular rhythm. Pulses: Normal pulses. Heart sounds: No murmur heard. Pulmonary:      Breath sounds: No stridor. No rhonchi or rales. Comments: Decreased breath sounds bilaterally  Chest:      Chest wall: No tenderness. Abdominal:      General: Bowel sounds are normal. There is no distension. Palpations: Abdomen is soft. There is no mass. Tenderness: There is no abdominal tenderness. Hernia: No hernia is present. Musculoskeletal:         General: No swelling. Normal range of motion. Cervical back: Normal range of motion. No rigidity. Skin:     General: Skin is warm and dry. Coloration: Skin is not jaundiced or pale. Neurological:      General: No focal deficit present. Mental Status: He is alert and oriented to person, place, and time. Mental status is at baseline. Cranial Nerves: No cranial nerve deficit. Sensory: No sensory deficit. Psychiatric:         Mood and Affect: Mood normal.         Behavior: Behavior normal.         Thought Content: Thought content normal.             Labs:   Recent Labs     10/16/21  0052 10/17/21  0518 10/18/21  0718   WBC 39.1* 35.8* 34.0*   HGB 12.9* 12.1* 13.2*   HCT 39.7* 36.7* 40.6    193 222      Recent Labs     10/16/21  0050 10/17/21  0518 10/18/21  0718    136 138   K 5.2* 5.3* 5.1    103 105   CO2 17* 20* 18*   BUN 68* 71* 61*   GLUCOSE 262* 201* 157*           Radiology, images personally reviewed.    Last chest x-ray done 10/10/2021 showing no acute pulmonary disease      Other imaging: Not indicated      Micro: Negative growth to date    Assessment:     Principal Problem:    COVID-19  Active Problems:    Chronic kidney disease (CKD), stage IV (severe) (HCC)    Chronic lymphocytic leukemia (HCC)    Atrial fibrillation with RVR (HCC)    Centrilobular emphysema (HCC)    Pulmonary fibrosis (HCC)    Lung nodules    DM (diabetes mellitus), secondary uncontrolled ([address]    Calculus of gallbladder without cholecystitis without obstruction  Resolved Problems:    * No resolved hospital problems. *      Discussion / Plan:         1. Covid, vaccinated patient with Marie Flanagan in Feb 2021  2. Agree with continuing with Eliquis 2.5 twice daily  3. Could consider increasing  4. We will go ahead and get ultrasound Dopplers of lower extremity  5. Continue with Solu-Medrol 40 every 12 hours, Protonix 40 mg daily  6. Wean oxygen as tolerated  7. Patient is at 94% on 15 L, could probably reduce it down to 12 L soon  8. Patient's D-dimer is elevated, and increasing  9. If the ultrasound Dopplers do turn positive then would need to change from Eliquis 2.5 to at least 10 mg twice daily of Eliquis  10. Unable do a CT scan with PA contrast because of creatinine being at 2.0          Ravi Callaway MD    [facility] Pulmonary, Critical Care and Sleep Medicine  [phone]    Please note that some or all of this record was generated using voice recognition software. If there are any questions about the content of this document, please contact the author as some errors in transcription may have occurred.

## 2021-10-19 PROBLEM — E87.5 HYPERKALEMIA: Status: ACTIVE | Noted: 2021-01-01

## 2021-10-19 NOTE — PLAN OF CARE
Problem: Falls - Risk of:  Goal: Will remain free from falls  Description: Will remain free from falls  Outcome: Ongoing  Goal: Absence of physical injury  Description: Absence of physical injury  Outcome: Ongoing     Problem: Airway Clearance - Ineffective  Goal: Achieve or maintain patent airway  Outcome: Ongoing     Problem: Gas Exchange - Impaired  Goal: Absence of hypoxia  Outcome: Ongoing  Goal: Promote optimal lung function  Outcome: Ongoing     Problem: Breathing Pattern - Ineffective  Goal: Ability to achieve and maintain a regular respiratory rate  Outcome: Ongoing     Problem: Body Temperature -  Risk of, Imbalanced  Goal: Ability to maintain a body temperature within defined limits  Outcome: Ongoing     Problem: Isolation Precautions - Risk of Spread of Infection  Goal: Prevent transmission of infection  Outcome: Ongoing     Problem: Nutrition Deficits  Goal: Optimize nutritional status  Outcome: Ongoing     Problem: Risk for Fluid Volume Deficit  Goal: Maintain normal heart rhythm  Outcome: Ongoing  Goal: Maintain normal serum potassium, sodium, calcium, phosphorus, and pH  Outcome: Ongoing     Problem: Loneliness or Risk for Loneliness  Goal: Demonstrate positive use of time alone when socialization is not possible  Outcome: Ongoing     Problem: Fatigue  Goal: Verbalize increase energy and improved vitality  Outcome: Ongoing     Problem: Skin Integrity:  Goal: Will show no infection signs and symptoms  Description: Will show no infection signs and symptoms  Outcome: Ongoing  Goal: Absence of new skin breakdown  Description: Absence of new skin breakdown  Outcome: Ongoing     Problem: Nutrition  Goal: Optimal nutrition therapy  Outcome: Ongoing     Problem: Pain:  Goal: Pain level will decrease  Description: Pain level will decrease  Outcome: Ongoing       Alert and oriented times 4. Multiple de-sats throughout shift upon removing non-rebreather. Recovered with replacing mask.  Denied pain throughout shift.

## 2021-10-19 NOTE — PROGRESS NOTES
Occupational Therapy  Facility/Department: [facility] C4 PCU  Daily Treatment Note  NAME: Greg Machado  : 1935  MRN: [id number]    Date of Service: 10/19/2021    Discharge Recommendations:  Subacute/Skilled Nursing Facility  OT Equipment Recommendations  Other: Defer to next level of care     Greg Machado scored a  on the AM-PAC ADL Inpatient form. Current research shows that an AM-PAC score of 17 or less is typically not associated with a discharge to the patient's home setting. Based on the patient's AM-PAC score and their current ADL deficits, it is recommended that the patient have 3-5 sessions per week of Occupational Therapy at d/c to increase the patient's independence. Please see assessment section for further patient specific details. If patient discharges prior to next session this note will serve as a discharge summary. Please see below for the latest assessment towards goals. Assessment   Performance deficits / Impairments: Decreased functional mobility ; Decreased safe awareness;Decreased balance;Decreased coordination;Decreased ADL status; Decreased endurance;Decreased high-level IADLs  Assessment: Pt PLOF I, pt very limited by endurance and O2 sats dropping with minimal activity on increased O2 demand this date, 15L of HF +15 NRB. Pt lives alone, functioning below his baseline with the above noted occupational performance deficits and would benefit from continued skilled OT in SNF setting at d/c. Prognosis: Fair  OT Education: OT Role;Plan of Care;Energy Conservation;Transfer Training;ADL Adaptive Strategies  Patient Education: disease specific: importance of use RED/nurse call light for Assist with ADL needs & transfers, pt able to return/demonstrate  REQUIRES OT FOLLOW UP: Yes  Activity Tolerance  Activity Tolerance: Treatment limited secondary to medical complications (free text)  Activity Tolerance: Pt required 15L +15 L of NRB; O2 decreased to 72% while sitting EOB.  Required ~4-5 mins to recover to mid/high 80s. Pt returned to bed and o2 increased to 95%  Safety Devices  Safety Devices in place: Yes  Type of devices: Call light within reach;Gait belt;Left in bed; All fall risk precautions in place; Bed alarm in place;Nurse notified         Patient Diagnosis(es): The primary encounter diagnosis was Generalized weakness. Diagnoses of COVID-19, LEONOR (acute kidney injury) ([facility] [address] Hyponatremia, Leukocytosis, unspecified type, Elevated LFTs, Thrombocytopenia ([address] Hypoxia, and Elevated d-dimer were also pertinent to this visit. has a past medical history of Back pain, Cancer ([facility] [address] Centrilobular emphysema ([address] Chronic kidney disease, COVID-19, Diabetes mellitus ([facility] [address] Essential tremor, History of BPH, Hypercholesteremia, Renal insufficiency, and Type 2 diabetes mellitus with hyperglycemia ([facility] [address] has a past surgical history that includes Cataract removal; Cystocopy; Kyphosis surgery (8/24/2012); other surgical history (Left, 06/01/2021); Kidney stone removal (Left, 6/1/2021); Nephrostomy (Left, 6/1/2021); Nephrostomy (Left, 6/1/2021); and hernia repair ([id number]). Restrictions  Restrictions/Precautions  Restrictions/Precautions: General Precautions, Isolation, Fall Risk  Position Activity Restriction  Other position/activity restrictions: Droplet plus; COVID +; 15L of HF and 15L of NRB  Subjective   General  Chart Reviewed: Yes  Patient assessed for rehabilitation services?: Yes  Response to previous treatment: Patient with no complaints from previous session  Family / Caregiver Present: No  Referring Practitioner: Dr. James Rubi  Diagnosis: CoVID, multiple falls  Subjective  Subjective: Pt resting in bed. Agreeable to OT.  Pt on 15L of HF + 15 NRB (92-95%) at rest  General Comment  Comments: RN cleared pt for OT eval; pt awake in bed, agreeable to OT  Vital Signs  Patient Currently in Pain: Denies   Orientation  Orientation  Overall Orientation Status: Within Functional Limits  Objective ADL  UE Dressing: Maximum assistance  Additional Comments: pt declined the need for additional ADLs. Pt with destauration to low 70s when sitting EOB to change gown. Increased time to recover to mid 80s        Balance  Sitting Balance: Supervision  Standing Balance: Unable to assess(comment)  Standing Balance  Time: 5-7 mins  Activity: sitting EOB  Comment: O2 ranging from 72-90% while sitting EOB; Increased time to recover and education on PLB  Functional Mobility  Functional Mobility Comments: Unable to assess due to O2 requirements  Bed mobility  Supine to Sit: Moderate assistance  Sit to Supine: Moderate assistance  Scootin Person assistance;Dependent/Total;Maximal assistance (scooting up in bed)  Comment: Pt required incrceased assist thsi date. Pt with a decrease in O2 to 72% upon transition to seated positiong. SpO2 ranging from 84-88% while seated at EOB  Transfers  Transfer Comments: unable to tolerate this date due to O2 requirements                       Cognition  Overall Cognitive Status: WFL  Insights: Decreased awareness of deficits            Plan   Plan  Times per week: 2-3  Current Treatment Recommendations: Endurance Training, ROM, Balance Training, Functional Mobility Training, Safety Education & Training, Positioning, Self-Care / ADL, Strengthening, Patient/Caregiver Education & Training    AM-PAC Score        AM-[facility] Inpatient Daily Activity Raw Score: 11 (10/19/21 1525)  AM-PAC Inpatient ADL T-Scale Score : 29.04 (10/19/21 1525)  ADL Inpatient CMS 0-100% Score: 70.42 (10/19/21 1525)  ADL Inpatient CMS G-Code Modifier : CL (10/19/21 1525)    Goals  Short term goals  Time Frame for Short term goals: 2 weeks(10-25-21)  Short term goal 1: supervision with bathroom mobility with LRAD (Least Restrictive Assistive Device) by 10-25-21-- ongoing 10/19/21  Short term goal 2: supervision with standing ADL's for 5 minutes by 10-25-21-- ongoing 10/19/21  Short term goal 3:  Independent with LE self care by 10-20-21-- ongoing 10/19/21  Short term goal 4: tolerate 15 reps BUE AROM exercises-- ongoing 10/19/21  Short term goal 5: supervision with functional/toilet transfers-- ongoing 10/19/21  Patient Goals   Patient goals : get stronger to go home       Therapy Time   Individual Concurrent Group Co-treatment   Time In 1307         Time Out 1345         Minutes 38         Timed Code Treatment Minutes: [address], OTR/L

## 2021-10-19 NOTE — PROGRESS NOTES
Physical Therapy  Facility/Department: [facility] C4 PCU  Daily Treatment Note  NAME: Adeola Watson  : 1935  MRN: [id number]    Date of Service: 10/19/2021    Discharge Recommendations:  Subacute/Skilled Nursing Facility   PT Equipment Recommendations  Equipment Needed: No    Assessment    Body structures, Functions, Activity limitations: Decreased functional mobility ; Decreased balance;Decreased strength;Decreased endurance  Assessment: Patient seen for bed mobility and sitting balance. Patient cleared by RN for therapy participation this date. Patient agreeable to therapy. Patient completed supine>sit with mod A, sat EOB x5-7 mins with CGA and cues for PLB d/t SOB and SPO2% desaturation. Pt returned to bed d/t low activity tolerance and fatigue. P.T .will continue to follow throughout LOS. Recommending DC to SNF d/t low activity tolerance and level of assist.  Treatment Diagnosis: impaired endurance  Prognosis: Good  Decision Making: Medium Complexity  PT Education: Goals; General Safety;PT Role;Disease Specific Education;Plan of Care; Functional Mobility Training;Transfer Training;Orientation;Precautions  Patient Education: pt educated on PLB technique and importance of upright positioning - demos understanding  Barriers to Learning: none  REQUIRES PT FOLLOW UP: Yes  Activity Tolerance  Activity Tolerance: Patient Tolerated treatment well;Patient limited by endurance  Activity Tolerance: 92% in supine at rest, 72% at lowest at EOB, 95% upon returning to supine on 15 L HFNC and nonrebreather mask. 120 bpm at highest at EOB. 110/63 at rest.     Patient Diagnosis(es): The primary encounter diagnosis was Generalized weakness. Diagnoses of COVID-19, LEONOR (acute kidney injury) ([facility] [address] Hyponatremia, Leukocytosis, unspecified type, Elevated LFTs, Thrombocytopenia ([address] Hypoxia, and Elevated d-dimer were also pertinent to this visit.      has a past medical history of Back pain, Cancer ([address] Centrilobular emphysema ([facility] [address] Chronic kidney disease, COVID-19, Diabetes mellitus ([facility] [address] Essential tremor, History of BPH, Hypercholesteremia, Renal insufficiency, and Type 2 diabetes mellitus with hyperglycemia ([facility] [address] has a past surgical history that includes Cataract removal; Cystocopy; Kyphosis surgery (2012); other surgical history (Left, 2021); Kidney stone removal (Left, 2021); Nephrostomy (Left, 2021); Nephrostomy (Left, 2021); and hernia repair ([id number]). Restrictions  Restrictions/Precautions  Restrictions/Precautions: General Precautions, Isolation, Fall Risk  Position Activity Restriction  Other position/activity restrictions: Droplet plus; COVID +; 15L of HF and 15L of NRB  Subjective   General  Chart Reviewed: Yes  Additional Pertinent Hx: PMH: Parkinson's Disease  Response To Previous Treatment: Patient with no complaints from previous session. Family / Caregiver Present: No  Referring Practitioner: Olivia Burleson MD  Subjective  Subjective: pt in bed, agreeable to therapy  Pain Screening  Patient Currently in Pain: Denies  Vital Signs  Patient Currently in Pain: Denies       Orientation  Orientation  Overall Orientation Status: Within Normal Limits     Objective   Bed mobility  Supine to Sit: Moderate assistance (HOB elevated)  Sit to Supine: Moderate assistance  Scootin Person assistance;Dependent/Total;Maximal assistance (max A x2 toward HOB)  Transfers  Sit to Stand: Unable to assess  Stand to sit: Unable to assess  Comment: Pt sat EOB x5-7 mins with CGA with cues for PLB to maintain SPO2%. Pt fatiguing quickly and requests to return to supine. Pt SOB at EOB with non-rebreather mask on.   Ambulation  Ambulation?: No                              AM-PAC Score  AM-PAC Inpatient Mobility Raw Score : 17 (10/19/21 1542)  AM-PAC Inpatient T-Scale Score : 42.13 (10/19/21 1542)  Mobility Inpatient CMS 0-100% Score: 50.57 (10/19/21 1542)  Mobility Inpatient CMS G-Code Modifier : CK (10/19/21 1542) Goals  Short term goals  Time Frame for Short term goals: 7 days ( 10/18/2021)  Short term goal 1: Pt will perform bed mobility independently--10/14 SBA  Short term goal 2: Pt will perform SPT and STS with cane and supervision--10/14 CGA  Short term goal 3: Pt will ambulate 50 ft with supervision and LRAD--10/14 min(A) up to 20 ft  Short term goal 4: Pt will perform 12-15 reps of BLE exercises to demo understanding of HEP--10/14- MET, on going  Patient Goals   Patient goals : \"to be independent\"    Plan    Plan  Times per week: 3-5x/week  Current Treatment Recommendations: Strengthening, Neuromuscular Re-education, Home Exercise Program, Safety Education & Training, Patient/Caregiver Education & Training, Endurance Training, Balance Training, Functional Mobility Training, Transfer Training, Gait Training, Stair training  Safety Devices  Type of devices: All fall risk precautions in place, Nurse notified, Call light within reach, Gait belt, Patient at risk for falls, Left in bed, Bed alarm in place  Restraints  Initially in place: No     Therapy Time   Individual Concurrent Group Co-treatment   Time In 1310         Time Out 1341         Minutes 31         Timed Code Treatment Minutes: 31 Minutes     If pt is unable to be seen after this session, please let this note serve as discharge summary. Please see case management note for discharge disposition. Thank you.     Leonel Rodriguez, PT

## 2021-10-19 NOTE — PROGRESS NOTES
Hospitalist Progress Note      PCP: No primary care provider on file. Date of Admission: 10/10/2021    Chief Complaint: Recurrent falls w/ SOB. Subjective: no new c/o. Medications:  Reviewed    Infusion Medications    sodium chloride      dextrose       Scheduled Medications    budesonide-formoterol  2 puff Inhalation BID    insulin glargine  15 Units SubCUTAneous BID    pantoprazole  40 mg Oral QAM AC    apixaban  2.5 mg Oral BID    digoxin  0.0625 mg Oral Q48H    dilTIAZem  120 mg Oral Daily    influenza virus vaccine  0.5 mL IntraMUSCular Prior to discharge    insulin lispro  0-18 Units SubCUTAneous TID WC    insulin lispro  0-9 Units SubCUTAneous Nightly    aspirin  81 mg Oral Daily    escitalopram  10 mg Oral Daily    rosuvastatin  40 mg Oral Daily    tamsulosin  0.4 mg Oral Daily    sodium chloride flush  5-40 mL IntraVENous 2 times per day    methylPREDNISolone  40 mg IntraVENous Q12H     PRN Meds: guaiFENesin-dextromethorphan, levalbuterol, perflutren lipid microspheres, prochlorperazine, sodium chloride flush, sodium chloride, polyethylene glycol, acetaminophen **OR** acetaminophen, glucose, dextrose, glucagon (rDNA), dextrose      Intake/Output Summary (Last 24 hours) at 10/19/2021 0856  Last data filed at 10/19/2021 0535  Gross per 24 hour   Intake 630 ml   Output 900 ml   Net -270 ml       Physical Exam Performed:    /67   Pulse 78   Temp 97.7 °F (36.5 °C) (Oral)   Resp 22   Ht 5' 8\" (1.727 m)   Wt 125 lb 14.1 oz (57.1 kg)   SpO2 95%   BMI 19.14 kg/m²     General appearance: No apparent distress, appears stated age and cooperative. HEENT: Pupils equal, round, and reactive to light. Conjunctivae/corneas clear. Neck: Supple, with full range of motion. No jugular venous distention. Trachea midline. Respiratory:  Normal respiratory effort. Clear to auscultation, bilaterally without Rales/Wheezes/Rhonchi.   Cardiovascular: Regular rate and rhythm with normal fibrillation with RVR (HCC) [I48.91]     COVID-19 [U07.1]     Chronic kidney disease (CKD), stage IV (severe) (HCC) [N18.4]     Chronic lymphocytic leukemia (HCC) [C91.10]          COVID 19 Positive - NAAT positive on 10 October w/ Sxs onset 27 Sept.  Possible unconfirmed OSF positive test 4 October. IV steroids. Can not given Baricitinib due to CKD and CLL. Infectious Disease consulted and appreciated. PNA - likely Acute Viral Pneumonia 2nd to above. Started on empiric tx as above. Acute Respiratory Failure - w/ hypoxia, POArrival.  Presence of clinical respiratory distress w/ tachypnea/dyspnea/SOB and wheezing w/ use of accessory muscles to breath. Supplemental O2 and wean as tolerated. Pulmonology consulted and appreciated. Afib - new onset w/ RVR, of unspecified and clinically unable to determine etiology. Not normally on rate control meds or anticoagulated at baseline. Monitored on tele. Cardiology consulted and appreciated, now off Dilt gtt on PO Dilt/Dig and Eliquis. Echo ordered 18 October and pending. Questionable Lung cancer - recent diagnosis per pt. Started work-up as outpt. Heme/Onc consulted and appreciated w/ recent CT chest noted fibrosis and nodules     CLL - w/ Lymphocytosis - chronically elevated WBCs. Monitor CBC. Oncology consulted (noted hx of CLL, no need for tx now, observation only). IgG sent by Oncology      HyperLipidemia - controlled on home Statin. Continue, w/ f/u and med adjustment w/ PCP     DM2- SSI and lantus. Monitor BS as will be elevated due to steroids and illness. A1c 7.5 July, 2021.      CKD - baseline stage 4. Follow serial labs. Reviewed and documented as above. 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 Follow serial labs. Reviewed and documented as above. Lokelma x 1 19 October ordered.        Renal Osteodystrophy - continue Vit D - 2000IU daily.       DVT Prophylaxis: Eliquis    Recent Labs     10/17/21  0518 10/18/21  0718 10/19/21  0526    [id number]     Diet: Adult Oral Nutrition Supplement; Diabetic Oral Supplement  ADULT DIET; Dysphagia - Minced and Moist; 4 carb choices (60 gm/meal)  Code Status: Limited      PT/OT Eval Status: seen w/ recs for SNF. Dispo - Possibly to SNF Tues/Wed 19/20 October pending clinical course, subspecialty recs and placement decision.      Ridge Maguire MD

## 2021-10-19 NOTE — PROGRESS NOTES
INPATIENT PULMONARY CRITICAL CARE PROGRESS NOTE      Reason for visit    COVID 19 pneumonia requiring 10 L oxygen now    SUBJECTIVE: Patient is to be patient continues to be on 15 L high flow oxygen along with nonrebreather facemask and saturating between 87 to 95%, patient continues to have mild tachypnea, patient does have some increase in the work of breathing, patient was afebrile, patient had sinus rhythm on the monitor which is ranging from normal sinus rhythm to sinus tachycardia, patient's blood sugars are not controlled, patient's urine output is borderline, patient's cumulative fluid balance as documented in epic was +2.5 L, no other pertinent review of system of concern         Physical Exam:  Blood pressure 125/67, pulse 78, temperature 97.6 °F (36.4 °C), resp. rate (!) 32, height 5' 8\" (1.727 m), weight 125 lb 14.1 oz (57.1 kg), SpO2 95 %.'     In-person bedside physical examination deferred.  Pursuant to the emergency declaration under the [employer] and the [facility] [place], [facility] waiver authority and the [employer] Act, this clinical encounter was conducted to provide necessary medical care.   (Also consistent with new provisions and guidance offered by [facility] on March 18, 2020 in setting of COVID 19 outbreak and in order to preserve personal protective equipment in accordance with the flexibilities announced by CMS on March 30, 2020)   References: [email or web address]://[facility]. [facility]/Portals/0/Resources/COVID-19/3_18%20Telemed%20Guidance%20Updated%20March%2018. pdf?[email or web address]                      https://[facility]. [facility]/Portals/0/Resources/COVID-19/3_18%20Telemed%20Guidance%20Updated%20March%2018. pdf?[email or web address]  [email or web address]. pdf         Results:  CBC:   Recent Labs     10/17/21  0518 10/18/21  0718 10/19/21  0526   WBC 35.8* 34.0* 26.2*   HGB 12.1* 13.2* 12.7*   HCT 36.7* 40.6 39.5*   MCV 86.5 88.1 89.7    222 182     BMP:   Recent Labs     10/17/21  0518 10/18/21  0718 10/19/21  0526    138 132*   K 5.3* 5.1 5.7*    105 102   CO2 20* 18* 16*   BUN 71* 61* 60*   CREATININE 2.5* 2.0* 1.9*     LIVER PROFILE:   Recent Labs     10/17/21  0518 10/18/21  0718 10/19/21  0526   AST 43* 39* 37   ALT 35 38 34   BILITOT 1.2* 1.6* 1.0   ALKPHOS 99 95 95       Imaging:  I have reviewed radiology images personally. VL Extremity Venous Bilateral         XR THORACIC SPINE (MIN 4 VIEWS)   Final Result   Multiple old compression fractures status post vertebral body enhancement T10   and T12      No acute fracture definitely identified         XR CHEST PORTABLE   Final Result   No acute cardiopulmonary disease      Stable interstitial opacities, likely fibrotic. CT CHEST WO CONTRAST    Result Date: 10/6/2021  EXAMINATION: CT OF THE CHEST WITHOUT CONTRAST 10/6/2021 10:24 pm TECHNIQUE: CT of the chest was performed without the administration of intravenous contrast. Multiplanar reformatted images are provided for review. Dose modulation, iterative reconstruction, and/or weight based adjustment of the mA/kV was utilized to reduce the radiation dose to as low as reasonably achievable. COMPARISON: 01/03/2018 HISTORY: ORDERING SYSTEM PROVIDED HISTORY: hx of lung cancer with sob TECHNOLOGIST PROVIDED HISTORY: Reason for exam:->hx of lung cancer with sob Decision Support Exception - unselect if not a suspected or confirmed emergency medical condition->Emergency Medical Condition (MA) Reason for Exam: Pain when coughing since Monday; known lung cancer for over a year Acuity: Acute Type of Exam: Initial FINDINGS: Mediastinum:   There is calcified plaque throughout the aorta which is mildly dilated and ectatic but unchanged. The pulmonary vessels are normal size.  There are small lymph nodes scattered in the AP window mediastinum measuring less than 1 cm. Upper Abdomen: There is a small stone along the upper pole left kidney which is more prominent. There calcifications in the gallbladder which are unchanged. The adrenals are normal.  The spleen is mildly enlarged but unchanged. Lungs/pleura: The lungs are mildly hyperinflated and emphysematous. There is mild scarring along the apices which is unchanged. There is hazy subpleural interstitial and ground-glass opacities along the lung bases posterolaterally which is unchanged. There is some mild subpleural opacity extending into the superior segment right lower lobe which is more prominent. No effusion is seen. There is 9 mm pleural base nodule along the right lung base posteriorly and a 6 mm pleural base nodule left lung base posteriorly which is unchanged. No new nodule seen. There are reticulonodular densities in the lingula inferiorly which is unchanged. There is mild linear scarring along the left lung base which is unchanged no. Soft Tissues/Bones: There are moderate to severe compression fractures throughout the mid to lower thoracic spine which are grossly unchanged. There is methylmethacrylate and several vertebra which are unchanged consistent with previous vertebroplasties no retropulsed or displaced fragment can be seen. Chronic obstructive lung changes with bibasilar fibrosis and scarring and bronchiectasis with subpleural interstitial opacities which is more prominent on the right and is slightly increased which could be due to progressive fibrosis or early pneumonitis recommend short-term follow-up Small pleural-based nodules along the lung bases posteriorly which are unchanged with no new nodule seen Mildly dilated and atherosclerotic thoracic aorta with moderate coronary artery calcifications which is unchanged. Small left renal stone. Cholelithiasis which is unchanged.      XR CHEST PORTABLE    Result Date: 10/10/2021  EXAMINATION: ONE XRAY VIEW OF THE CHEST 10/10/2021 10:57 am COMPARISON: CT chest, 10/06/2021 HISTORY: ORDERING SYSTEM PROVIDED HISTORY: SOB TECHNOLOGIST PROVIDED HISTORY: Reason for exam:->SOB Reason for Exam: sob Acuity: Acute Type of Exam: Initial FINDINGS: The cardiac silhouette is at the upper limits of normal.  Thoracic aorta is tortuous and mildly calcified. Pulmonary vessels are normal in caliber. Interstitial opacities are noted in the lungs. No alveolar consolidation, pleural effusion or pneumothorax is seen. No acute cardiopulmonary disease Stable interstitial opacities, likely fibrotic. XR CHEST PORTABLE    Result Date: 10/6/2021  EXAMINATION: ONE XRAY VIEW OF THE CHEST 10/6/2021 7:38 pm COMPARISON: 10/01/2019. HISTORY: ORDERING SYSTEM PROVIDED HISTORY: shortness of breath TECHNOLOGIST PROVIDED HISTORY: Reason for exam:->shortness of breath Reason for Exam: sob Acuity: Acute Type of Exam: Initial FINDINGS: The cardiomediastinal silhouette is unremarkable. Aortic vascular calcification. The lungs are clear. No infiltrate, pleural fluid or evidence of overt failure. No acute cardiopulmonary disease. XR THORACIC SPINE (MIN 4 VIEWS)    Result Date: 10/10/2021  EXAMINATION: FOUR XRAY VIEWS OF THE THORACIC SPINE 10/10/2021 10:54 am COMPARISON: CT done 10/06/2021 01/03/2018 HISTORY: ORDERING SYSTEM PROVIDED HISTORY: fall; back pain TECHNOLOGIST PROVIDED HISTORY: Reason for exam:->fall; back pain FINDINGS: Osteopenia. Multilevel degenerative disc disease. Multiple compression fractures again noted with vertebral body enhancement at T10 and T12. No definite new fracture identified.      Multiple old compression fractures status post vertebral body enhancement T10 and T12 No acute fracture definitely identified     VL Extremity Venous Bilateral    Result Date: 10/19/2021  Vascular Lower Extremities DVT Study Procedure -- PRELIMINARY SONOGRAPHER REPORT --   Demographics   Patient Name       Ricky MELO   Date of Study      10/19/2021 Gender              Male   Patient Number     [id number]         Date of Birth       1935   Visit Number       [id number]          Age                 81 year(s)   Accession Number   [id number]         Room Number         0430   Corporate ID       [id number]           Sonographer         REMI AguirreT   Ordering Physician [facility]                     MD                 Physician           Readers  Procedure Type of Study:   Veins:Lower Extremities DVT Study, VASC EXTREMITY VENOUS DUPLEX BILATERAL. Tech Comments Right Technically difficult and limited study due to calcific shadowing. No evidence of deep vein or superficial vein thrombosis involving the right lower extremity. There is a Baker's cyst present at the popliteal fossa of the right leg. Left No evidence of deep vein or superficial vein thrombosis involving the left lower extremity. Results for Lloyd Robertson (MRN [id number]) as of 10/19/2021 11:28   Ref.  Range 10/18/2021 07:18 10/18/2021 08:19 10/18/2021 11:37 10/18/2021 16:23 10/18/2021 20:25 10/19/2021 05:26 10/19/2021 08:01   Sodium Latest Ref Range: 136 - 145 mmol/L 138     132 (L)    Potassium Latest Ref Range: 3.5 - 5.1 mmol/L 5.1     5.7 (H)    Chloride Latest Ref Range: 99 - 110 mmol/L 105     102    CO2 Latest Ref Range: 21 - 32 mmol/L 18 (L)     16 (L)    BUN Latest Ref Range: 7 - 20 mg/dL 61 (H)     60 (H)    Creatinine Latest Ref Range: 0.8 - 1.3 mg/dL 2.0 (H)     1.9 (H)    Anion Gap Latest Ref Range: 3 - 16  15     14    GFR Non- Latest Ref Range: >60  32 (A)     34 (A)    GFR  Latest Ref Range: >60  39 (A)     41 (A)    Glucose Latest Ref Range: 70 - 99 mg/dL 157 (H)     263 (H)    POC Glucose Latest Ref Range: 70 - 99 mg/dl  168 (H) 232 (H) 190 (H) 272 (H)  264 (H)   Calcium Latest Ref Range: 8.3 - 10.6 mg/dL 8.7     8.7    Total Protein Latest Ref Range: 6.4 - 8.2 g/dL 6.5     6.5      Results for Lwe Vitale (MRN [id number]) as of 10/19/2021 11:28   Ref. Range 10/13/2021 13:27 10/16/2021 00:52 10/17/2021 05:18 10/18/2021 07:18 10/19/2021 05:26   WBC Latest Ref Range: 4.0 - 11.0 K/uL 37.0 (H) 39.1 (H) 35.8 (H) 34.0 (H) 26.2 (H)   RBC Latest Ref Range: 4.20 - 5.90 M/uL 4.34 4.51 4.25 4.61 4.40   Hemoglobin Quant Latest Ref Range: 13.5 - 17.5 g/dL 12.4 (L) 12.9 (L) 12.1 (L) 13.2 (L) 12.7 (L)   Hematocrit Latest Ref Range: 40.5 - 52.5 % 37.4 (L) 39.7 (L) 36.7 (L) 40.6 39.5 (L)   MCV Latest Ref Range: 80.0 - 100.0 fL 86.2 87.9 86.5 88.1 89.7   MCH Latest Ref Range: 26.0 - 34.0 pg 28.5 28.6 28.6 28.6 28.8   MCHC Latest Ref Range: 31.0 - 36.0 g/dL 33.1 32.5 33.0 32.5 32.1   MPV Latest Ref Range: 5.0 - 10.5 fL 8.3 7.4 8.1 7.6 8.3   RDW Latest Ref Range: 12.4 - 15.4 % 16.5 (H) 16.7 (H) 16.6 (H) 16.6 (H) 17.3 (H)   Platelet Count Latest Ref Range: 135 - 450 K/uL 118 (L) 193 193 222 182   Neutrophils % Latest Units: % 17.0  26.0 38.0 37.0   Lymphocyte % Latest Units: % 82.0  72.0 58.0 60.0   Monocytes % Latest Units: % 0.0  2.0 0.0 1.0     Venous doppler-  Tech Comments   Right   Technically difficult and limited study due to calcific shadowing. No evidence of deep vein or superficial vein thrombosis involving the right   lower extremity. There is a Baker's cyst present at the popliteal fossa of the right leg. Left   No evidence of deep vein or superficial vein thrombosis involving the left   lower extremity. Assessment:  Principal Problem:    COVID-19  Active Problems:    Chronic kidney disease (CKD), stage IV (severe) (HCC)    Chronic lymphocytic leukemia (HCC)    Atrial fibrillation with RVR (HCC)    Centrilobular emphysema (HCC)    Pulmonary fibrosis (HCC)    Lung nodules    DM (diabetes mellitus), secondary uncontrolled (HCC)    Calculus of gallbladder without cholecystitis without obstruction    Hyperkalemia  Resolved Problems:    * No resolved hospital problems.  *          Plan:   · O2

## 2021-10-19 NOTE — PROGRESS NOTES
Infectious Disease Follow up Notes    CC :  Severe COVID in immunocompromised host      Antibiotics:   None     Solumedrol 40 q12, day #9/10    Admit Date:   10/10/2021  Hospital Day: 10    Subjective:   He remains afebrile  Says he is feeling weaker. Not coughing. Feels SOB with any activity.     Currently on 15L HFNC     Objective:     Patient Vitals for the past 8 hrs:   BP Temp Temp src Pulse Resp SpO2   10/19/21 1712 138/70 97.5 °F (36.4 °C) Oral 105 (!) 32 (!) 84 %   10/19/21 1230 110/63 97.6 °F (36.4 °C) Axillary 162 (!) 36 (!) 87 %       EXAM:  General:  Alert, oriented, appears comfortable    HEENT:  NCAT, PERRL, sclera anicteric  MMM  NECK:  supple,symmetrical       LUNGS:   Non-labored breathing     SKIN: No focal rash           LINE: IV site ok         Scheduled Meds:   insulin glargine  25 Units SubCUTAneous BID    budesonide-formoterol  2 puff Inhalation BID    pantoprazole  40 mg Oral QAM AC    apixaban  2.5 mg Oral BID    digoxin  0.0625 mg Oral Q48H    dilTIAZem  120 mg Oral Daily    influenza virus vaccine  0.5 mL IntraMUSCular Prior to discharge    insulin lispro  0-18 Units SubCUTAneous TID WC    insulin lispro  0-9 Units SubCUTAneous Nightly    aspirin  81 mg Oral Daily    escitalopram  10 mg Oral Daily    rosuvastatin  40 mg Oral Daily    tamsulosin  0.4 mg Oral Daily    sodium chloride flush  5-40 mL IntraVENous 2 times per day    methylPREDNISolone  40 mg IntraVENous Q12H       Continuous Infusions:   sodium chloride      dextrose            Data Review:    Lab Results   Component Value Date    WBC 26.2 (H) 10/19/2021    HGB 12.7 (L) 10/19/2021    HCT 39.5 (L) 10/19/2021    MCV 89.7 10/19/2021     10/19/2021     Lab Results   Component Value Date    CREATININE 1.9 (H) 10/19/2021    BUN 60 (H) 10/19/2021     (L) 10/19/2021    K 5.7 (H) 10/19/2021     10/19/2021    CO2 16 (L) 10/19/2021       Hepatic Function Panel:   Lab Results   Component Value Date    ALKPHOS 95 10/19/2021    ALT 34 10/19/2021    AST 37 10/19/2021    PROT 6.5 10/19/2021    PROT 6.6 09/05/2012    BILITOT 1.0 10/19/2021    BILIDIR <0.2 07/05/2017    IBILI see below 07/05/2017    LABALBU 3.2 10/19/2021         MICRO:  10/10 COVID NAAT+      IMAGING:  CXR 10/10/21  Impression   No acute cardiopulmonary disease       Stable interstitial opacities, likely fibrotic.        Assessment:     Patient Active Problem List    Diagnosis Date Noted    Hyperkalemia 10/19/2021    Centrilobular emphysema ([address] 10/17/2021    Pulmonary fibrosis ([address] 10/17/2021    Lung nodules 10/17/2021    DM (diabetes mellitus), secondary uncontrolled ([address] 10/17/2021    Elevated d-dimer 10/17/2021    Elevated ferritin level 10/17/2021    Calculus of gallbladder without cholecystitis without obstruction 10/17/2021    Acute respiratory failure due to COVID-19 [facility]) 10/16/2021    Atrial fibrillation with RVR ([address] 10/16/2021    COVID-19 10/10/2021    Renal calculi 06/01/2021    Chronic kidney disease (CKD), stage IV (severe) ([address] 09/22/2020    Chronic lymphocytic leukemia ([address] 01/15/2020    Essential tremor 11/14/2019    DM (diabetes mellitus), secondary, uncontrolled, w/neurologic complic ([address] 48/34/0217    HTN (hypertension), benign 11/14/2019    Dyslipidemia 11/14/2019    Lymphocytosis 07/01/2019    Chronic kidney disease, stage III (moderate) ([address] 07/01/2019    Right wrist pain 06/11/2019    Abrasion of right forearm 06/11/2019    Abrasion of left forearm 06/11/2019    Type 2 diabetes mellitus with hyperglycemia ([address] 12/21/2017    Bradycardia 07/08/2014    Diabetes mellitus ([address] 10/03/2012    Murmur, cardiac 10/03/2012    Thoracic compression fracture ([address] 10/03/2012     Overview Note:     S/p kyphplasty T 10, T12      MVP (mitral valve prolapse) 10/02/2012    Hyperlipemia 10/02/2012    BPH (benign prostatic hyperplasia) 10/02/2012    IGT (impaired glucose tolerance) 96/49/1049    Dysmetabolic syndrome X 22/99/8552    Benign essential tremor 10/02/2012    HTN (hypertension) 10/02/2012    Hematuria 10/02/2012    Renal insufficiency 10/02/2012     Overview Note:     Released by Nephrology.  Recommend bmp and urine studies q 6 months         COVID-19 and acute hypoxemic respiratory failure   Fully vaccinated x2 doses, immunocompromised host with hypogammaglobulinemia failed to mount adequate response to vaccines   Date of symptom onset ~10/4/21  Date of diagnosis and admission 10/10/21  Moderate elevation CRP, declining at last assessment   Worsening hypoxemia despite steroid    High risk of morbidity and mortality given age and comorbidities   -continue steroid, today is day 9/10  -given worsening hypoxemic respiratory failure, will add baricitinib, dose reduced for CKD  -DVT ppx  -outside window of benefit from mab, antiviral therapy   -isolation for 20d from symptom onset      CLL     CKD at baseline   GFR ~21     NKDA          Discussed with patient/family, all questions answered  D/w SANA Ram MD  Phone: [phone]   Fax : [phone]

## 2021-10-20 NOTE — PROGRESS NOTES
10/20/21 1204   Oxygen Therapy/Pulse Ox   O2 Therapy Oxygen humidified   O2 Device Heated high flow cannula   O2 Flow Rate (L/min) 40 L/min   FiO2  100 %   SpO2 (!) 88 %   Humidification Source Heated wire   Humidification Temp Measured 33   Pulse Oximeter Device Mode Continuous   Pulse Oximeter Device Location Finger

## 2021-10-20 NOTE — PROGRESS NOTES
10/20/21 1943   Oxygen Therapy/Pulse Ox   O2 Therapy Oxygen humidified   O2 Device Heated high flow cannula   O2 Flow Rate (L/min) 40 L/min   FiO2  100 %   Resp 20   SpO2 91 %   Humidification Source Heated wire   Humidification Temp 37

## 2021-10-20 NOTE — PROGRESS NOTES
Hospitalist Progress Note      PCP: No primary care provider on file. Date of Admission: 10/10/2021    Chief Complaint: Recurrent falls w/ SOB. Subjective: no new c/o. Medications:  Reviewed    Infusion Medications    sodium chloride      dextrose       Scheduled Medications    insulin glargine  25 Units SubCUTAneous BID    baricitinib  1 mg Oral Daily    budesonide-formoterol  2 puff Inhalation BID    pantoprazole  40 mg Oral QAM AC    apixaban  2.5 mg Oral BID    digoxin  0.0625 mg Oral Q48H    dilTIAZem  120 mg Oral Daily    influenza virus vaccine  0.5 mL IntraMUSCular Prior to discharge    insulin lispro  0-18 Units SubCUTAneous TID WC    insulin lispro  0-9 Units SubCUTAneous Nightly    aspirin  81 mg Oral Daily    escitalopram  10 mg Oral Daily    rosuvastatin  40 mg Oral Daily    tamsulosin  0.4 mg Oral Daily    sodium chloride flush  5-40 mL IntraVENous 2 times per day    methylPREDNISolone  40 mg IntraVENous Q12H     PRN Meds: guaiFENesin-dextromethorphan, levalbuterol, perflutren lipid microspheres, prochlorperazine, sodium chloride flush, sodium chloride, polyethylene glycol, acetaminophen **OR** acetaminophen, glucose, dextrose, glucagon (rDNA), dextrose      Intake/Output Summary (Last 24 hours) at 10/20/2021 0908  Last data filed at 10/20/2021 0545  Gross per 24 hour   Intake    Output 900 ml   Net -900 ml       Physical Exam Performed:    /64   Pulse 85   Temp 97.5 °F (36.4 °C) (Oral)   Resp 18   Ht 5' 8\" (1.727 m)   Wt 128 lb 4.9 oz (58.2 kg)   SpO2 91%   BMI 19.51 kg/m²     General appearance: No apparent distress, appears stated age and cooperative. HEENT: Pupils equal, round, and reactive to light. Conjunctivae/corneas clear. Neck: Supple, with full range of motion. No jugular venous distention. Trachea midline. Respiratory:  Normal respiratory effort. Clear to auscultation, bilaterally without Rales/Wheezes/Rhonchi.   Cardiovascular: Regular rate and rhythm with normal S1/S2 without murmurs, rubs or gallops. Abdomen: Soft, non-tender, non-distended with normal bowel sounds. Musculoskeletal: No clubbing, cyanosis or edema bilaterally. Full range of motion without deformity. Skin: Skin color, texture, turgor normal.  No rashes or lesions. Neurologic:  Neurovascularly intact without any focal sensory/motor deficits. Cranial nerves: II-XII intact, grossly non-focal.  Psychiatric: Alert and oriented, thought content appropriate, normal insight  Capillary Refill: Brisk,< 3 seconds   Peripheral Pulses: +2 palpable, equal bilaterally       Labs:   Recent Labs     10/18/21  0718 10/19/21  0526 10/20/21  0535   WBC 34.0* 26.2* 35.8*   HGB 13.2* 12.7* 13.8   HCT 40.6 39.5* 42.7    182 259     Recent Labs     10/18/21  0718 10/19/21  0526 10/20/21  0535    132* 141   K 5.1 5.7* 5.3*    102 107   CO2 18* 16* 20*   BUN 61* 60* 70*   CREATININE 2.0* 1.9* 2.0*   CALCIUM 8.7 8.7 9.0     Recent Labs     10/18/21  0718 10/19/21  0526 10/20/21  0535   AST 39* 37 32   ALT 38 34 33   BILITOT 1.6* 1.0 0.9   ALKPHOS 95 95 93     No results for input(s): INR in the last 72 hours. No results for input(s): Josette Gazella in the last 72 hours.     Urinalysis:      Lab Results   Component Value Date    NITRU Negative 10/10/2021    WBCUA 0-2 10/10/2021    BACTERIA 2+ 01/02/2020    RBCUA None seen 10/10/2021    BLOODU LARGE 10/10/2021    SPECGRAV 1.025 10/10/2021    GLUCOSEU Negative 10/10/2021       Consults:    IP CONSULT TO HOSPITALIST  IP CONSULT TO ONCOLOGY  IP CONSULT TO INFECTIOUS DISEASES  IP CONSULT TO CARDIOLOGY  IP CONSULT TO PULMONOLOGY      Assessment/Plan:    Active Hospital Problems    Diagnosis     Hyperkalemia [E87.5]     Centrilobular emphysema ([address] [J43.2]     Pulmonary fibrosis ([address] [J84.10]     Lung nodules [R91.8]     DM (diabetes mellitus), secondary uncontrolled ([address] [E13.65]     Calculus of gallbladder without cholecystitis without obstruction [K80.20]     Atrial fibrillation with RVR (HCC) [I48.91]     COVID-19 [U07.1]     Chronic kidney disease (CKD), stage IV (severe) (HCC) [N18.4]     Chronic lymphocytic leukemia (HCC) [C91.10]          COVID 19 Positive - NAAT positive on 10 October w/ Sxs onset 27 Sept.  Possible unconfirmed [facility] positive test 4 October. IV steroids. Baricitinib started 19 October per Infectious Disease - consulted and appreciated. PNA - likely Acute Viral Pneumonia 2nd to above. Started on empiric tx as above. Acute Respiratory Failure - w/ hypoxia, POArrival.  Presence of clinical respiratory distress w/ tachypnea/dyspnea/SOB and wheezing w/ use of accessory muscles to breath. Supplemental O2 and wean as tolerated. Pulmonology consulted and appreciated. Afib - new onset w/ RVR, of unspecified and clinically unable to determine etiology. Not normally on rate control meds or anticoagulated at baseline. Monitored on tele. Cardiology consulted and appreciated, now off Dilt gtt on PO Dilt/Dig and Eliquis. Echo ordered 18 October and pending. Questionable Lung cancer - recent diagnosis per pt. Started work-up as outpt. Heme/Onc consulted and appreciated w/ recent CT chest noted fibrosis and nodules     CLL - w/ Lymphocytosis - chronically elevated WBCs. Monitor CBC. Oncology consulted (noted hx of CLL, no need for tx now, observation only). IgG sent by Oncology      HyperLipidemia - controlled on home Statin. Continue, w/ f/u and med adjustment w/ PCP     DM2- SSI and lantus. Monitor BS as will be elevated due to steroids and illness. A1c 7.5 July, 2021.      CKD - baseline stage 4. Follow serial labs. Reviewed and documented as above. 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 Follow serial labs. Reviewed and documented as above. Lokelma x 1 19 October, re-ordered 20 October.      Renal Osteodystrophy - continue Vit D.       DVT Prophylaxis: Eliquis    Recent Labs 10/18/21  0718 10/19/21  0526 10/20/21  0535    182 259     Diet: Adult Oral Nutrition Supplement; Diabetic Oral Supplement  ADULT DIET; Dysphagia - Minced and Moist  Code Status: Limited      PT/OT Eval Status: seen w/ recs for SNF. Dispo - Possibly to SNF Tues/Wed 19/20 October pending clinical course, subspecialty recs and placement decision.      Ju Sanchez MD

## 2021-10-20 NOTE — PROGRESS NOTES
INPATIENT PULMONARY CRITICAL CARE PROGRESS NOTE      Reason for visit    COVID 19 pneumonia requiring 10 L oxygen now    SUBJECTIVE: Patient has worsening oxygen requirements overnight and now on 40 L flow rate and 100% oxygen on vapotherm with SaO2 ranging from 87 to 94%  patient does have some increase in the work of breathing, patient was afebrile, patient had sinus rhythm on the monitor which is ranging from normal sinus rhythm to sinus tachycardia, patient's blood sugars are still  not controlled, patient's urine output is borderline, patient's PO intake has not been documented , no other pertinent review of system of concern         Physical Exam:  Blood pressure 122/64, pulse 85, temperature 97.5 °F (36.4 °C), temperature source Oral, resp. rate 19, height 5' 8\" (1.727 m), weight 128 lb 4.9 oz (58.2 kg), SpO2 (!) 87 %.'     In-person bedside physical examination deferred.  Pursuant to the emergency declaration under the [address] [place] [address] and the [employer] and [employer] Act, this clinical encounter was conducted to provide necessary medical care.   (Also consistent with new provisions and guidance offered by [facility] on March 18, 2020 in setting of COVID 19 outbreak and in order to preserve personal protective equipment in accordance with the flexibilities announced by CMS on March 30, 2020)   References: [email or web address]://[facility]. [place].[facility]/Portals/0/Resources/COVID-19/3_18%20Telemed%20Guidance%20Updated%20March%2018. pdf?[email or web address]                      https://[facility]. [facility]/Portals/0/Resources/COVID-19/3_18%20Telemed%20Guidance%20Updated%20March%2018. pdf?gub=[email or web address]  [email or web address]. pdf         Results:  CBC:   Recent Labs     10/18/21  0718 10/19/21  0526 10/20/21  0535   WBC 34.0* 26.2* 35.8*   HGB 13.2* 12.7* 13.8   HCT 40.6 39.5* 42.7   MCV 88.1 89.7 88.6    182 259     BMP:   Recent Labs     10/18/21  0718 10/19/21  0526 10/20/21  0535    132* 141   K 5.1 5.7* 5.3*    102 107   CO2 18* 16* 20*   BUN 61* 60* 70*   CREATININE 2.0* 1.9* 2.0*     LIVER PROFILE:   Recent Labs     10/18/21  0718 10/19/21  0526 10/20/21  0535   AST 39* 37 32   ALT 38 34 33   BILITOT 1.6* 1.0 0.9   ALKPHOS 95 95 93       Imaging:  I have reviewed radiology images personally. VL Extremity Venous Bilateral   Final Result      XR THORACIC SPINE (MIN 4 VIEWS)   Final Result   Multiple old compression fractures status post vertebral body enhancement T10   and T12      No acute fracture definitely identified         XR CHEST PORTABLE   Final Result   No acute cardiopulmonary disease      Stable interstitial opacities, likely fibrotic. Results for Elsi Bound (MRN [id number]) as of 10/20/2021 11:57   Ref.  Range 10/19/2021 05:26 10/19/2021 08:01 10/19/2021 12:08 10/19/2021 16:28 10/19/2021 19:46 10/20/2021 05:35 10/20/2021 07:28 10/20/2021 11:21   Sodium Latest Ref Range: 136 - 145 mmol/L 132 (L)     141     Potassium Latest Ref Range: 3.5 - 5.1 mmol/L 5.7 (H)     5.3 (H)     Chloride Latest Ref Range: 99 - 110 mmol/L 102     107     CO2 Latest Ref Range: 21 - 32 mmol/L 16 (L)     20 (L)     BUN Latest Ref Range: 7 - 20 mg/dL 60 (H)     70 (H)     Creatinine Latest Ref Range: 0.8 - 1.3 mg/dL 1.9 (H)     2.0 (H)     Anion Gap Latest Ref Range: 3 - 16  14     14     GFR Non- Latest Ref Range: >60  34 (A)     32 (A)     GFR  Latest Ref Range: >60  41 (A)     39 (A)     Glucose Latest Ref Range: 70 - 99 mg/dL 263 (H)     202 (H)     POC Glucose Latest Ref Range: 70 - 99 mg/dl  264 (H) 310 (H) 232 (H) 146 (H)  209 (H) 365 (H)   Calcium Latest Ref Range: 8.3 - 10.6 mg/dL 8.7     9.0     Total Protein Latest Ref Range: 6.4 - 8.2 g/dL 6.5     6.9       Results for Elsi Hoffman (MRN [id number]) as of 10/20/2021 11:57   Ref. Range 10/17/2021 05:18 10/18/2021 07:18 10/19/2021 05:26 10/20/2021 05:35   WBC Latest Ref Range: 4.0 - 11.0 K/uL 35.8 (H) 34.0 (H) 26.2 (H) 35.8 (H)   RBC Latest Ref Range: 4.20 - 5.90 M/uL 4.25 4.61 4.40 4.82   Hemoglobin Quant Latest Ref Range: 13.5 - 17.5 g/dL 12.1 (L) 13.2 (L) 12.7 (L) 13.8   Hematocrit Latest Ref Range: 40.5 - 52.5 % 36.7 (L) 40.6 39.5 (L) 42.7   MCV Latest Ref Range: 80.0 - 100.0 fL 86.5 88.1 89.7 88.6   MCH Latest Ref Range: 26.0 - 34.0 pg 28.6 28.6 28.8 28.6   MCHC Latest Ref Range: 31.0 - 36.0 g/dL 33.0 32.5 32.1 32.3   MPV Latest Ref Range: 5.0 - 10.5 fL 8.1 7.6 8.3 8.1   RDW Latest Ref Range: 12.4 - 15.4 % 16.6 (H) 16.6 (H) 17.3 (H) 17.2 (H)   Platelet Count Latest Ref Range: 135 - 450 K/uL 193 222 182 259   Neutrophils % Latest Units: % 26.0 38.0 37.0 28.0   Lymphocyte % Latest Units: % 72.0 58.0 60.0 72.0   Monocytes % Latest Units: % 2.0 0.0 1.0 0.0     Results for Aline Alcantar (MRN [id number]) as of 10/20/2021 11:57   Ref. Range 10/13/2021 07:07 10/14/2021 06:48 10/15/2021 06:18 10/16/2021 05:07 10/17/2021 05:18 10/18/2021 07:18 10/19/2021 05:26 10/20/2021 05:35   D-Dimer, Carry Green Latest Ref Range: 0 - 229 ng/mL  (H) 429 (H) 518 (H) 1015 (H) 1166 (H) 2626 (H) 2766 (H) 2098 (H)       Assessment:  Principal Problem:    COVID-19  Active Problems:    Chronic kidney disease (CKD), stage IV (severe) (HCC)    Chronic lymphocytic leukemia (HCC)    Atrial fibrillation with RVR (HCC)    Centrilobular emphysema (HCC)    Pulmonary fibrosis (HCC)    Lung nodules    DM (diabetes mellitus), secondary uncontrolled ([address]    Calculus of gallbladder without cholecystitis without obstruction    Hyperkalemia  Resolved Problems:    * No resolved hospital problems.  *          Plan:   · O2 supplementation to keep SaO2 between 90-94% ONLY  · Please titrate oxygen as per above parameters   · Patient's hypoxemia has worsened and patient is now on vapotherm oxygen  to maintain oxygen saturation   · Please monitor for any worsening hypoxemia and respiratory failure  · IV solumedrol to continue  · Patient's lantus insulin was changed to 35units BID  · Titration of lantus insulin as per BGM  · Patient is now in sinus rhythm on PO cardizem and digoxin  · Venous doppler as done yesterday was negative for any DVT   · PO Eliquis to continue  · Monitor for any bleeding  · Baricitinib started by ID at low dose secondary to renal faiilure   · Patient's BUN and creatinine is worsening   · Lekocytosis secondary to CLL  · Symbicort to continue   · Trend the inflammatory markers   · Monitor I/O and BMP  · Correct electrolytes on PRN basis   · Lokelma ordered -if Ok with IM   · PUD prophylaxis started     Patient's clinical status is precarious and has potential for decompensation and needs to be monitored closely in progressive care unit     IM to have advanced care planning discussions with the patient,if deemed appropriate;to discuss the code status-if patient has worsening respiratory status and needs mechanical ventilatory support -it may be prolonged intubation and also may not be able to be extubated given the underlying COPD /pulmonary fibrosis ,in addition to [place] 19-discussed with nursing  Palliative care consult placed     Case d/w nursing             Electronically signed by:  Asha Romo MD    10/20/2021    11:55 AM.

## 2021-10-20 NOTE — PROGRESS NOTES
10/20/21 1003   Oxygen Therapy/Pulse Ox   O2 Therapy Oxygen humidified   $Oxygen $Daily Charge   O2 Device Heated high flow cannula   O2 Flow Rate (L/min) 40 L/min   FiO2  100 %   Resp 19   SpO2 (!) 87 %   Humidification Source Heated wire   Humidification Temp 37   Pulse Oximeter Device Mode Continuous   Pulse Oximeter Device Location Finger   $Pulse Oximeter $Spot check (multiple/continuous)

## 2021-10-20 NOTE — PROGRESS NOTES
Infectious Disease Follow up Notes    CC :  Severe COVID in immunocompromised host      Antibiotics:   None     Solumedrol 40 q12, day #10/10  baricitinib s 10/19/21    Admit Date:   10/10/2021  Hospital Day: 11    Subjective:   He remains afebrile  Now on vapotherm     Objective:     Patient Vitals for the past 8 hrs:   BP Temp Temp src Pulse Resp SpO2   10/20/21 1245 125/71 97.7 °F (36.5 °C) Axillary 105 19 97 %   10/20/21 1239    105     10/20/21 1204      (!) 88 %   10/20/21 1003     19 (!) 87 %   10/20/21 0945 133/70 98.2 °F (36.8 °C) Axillary 95 19 (!) 86 %   10/20/21 0843      91 %       EXAM:  General:  Alert, appears comfortable     LUNGS:   Non-labored breathing     SKIN: No focal rash           LINE: IV site ok         Scheduled Meds:   insulin glargine  35 Units SubCUTAneous BID    baricitinib  1 mg Oral Daily    budesonide-formoterol  2 puff Inhalation BID    pantoprazole  40 mg Oral QAM AC    apixaban  2.5 mg Oral BID    digoxin  0.0625 mg Oral Q48H    dilTIAZem  120 mg Oral Daily    influenza virus vaccine  0.5 mL IntraMUSCular Prior to discharge    insulin lispro  0-18 Units SubCUTAneous TID WC    insulin lispro  0-9 Units SubCUTAneous Nightly    aspirin  81 mg Oral Daily    escitalopram  10 mg Oral Daily    rosuvastatin  40 mg Oral Daily    tamsulosin  0.4 mg Oral Daily    sodium chloride flush  5-40 mL IntraVENous 2 times per day    methylPREDNISolone  40 mg IntraVENous Q12H       Continuous Infusions:   sodium chloride      dextrose            Data Review:    Lab Results   Component Value Date    WBC 35.8 (H) 10/20/2021    HGB 13.8 10/20/2021    HCT 42.7 10/20/2021    MCV 88.6 10/20/2021     10/20/2021     Lab Results   Component Value Date    CREATININE 2.0 (H) 10/20/2021    BUN 70 (H) 10/20/2021     10/20/2021    K 5.3 (H) 10/20/2021     10/20/2021    CO2 20 (L) 10/20/2021       Hepatic Function Panel:   Lab Results   Component Value Date    ALKPHOS 93 10/20/2021    ALT 33 10/20/2021    AST 32 10/20/2021    PROT 6.9 10/20/2021    PROT 6.6 09/05/2012    BILITOT 0.9 10/20/2021    BILIDIR <0.2 07/05/2017    IBILI see below 07/05/2017    LABALBU 3.2 10/20/2021         MICRO:  10/10 COVID NAAT+      IMAGING:  CXR 10/10/21  Impression   No acute cardiopulmonary disease       Stable interstitial opacities, likely fibrotic.        Assessment:     Patient Active Problem List    Diagnosis Date Noted    Hyperkalemia 10/19/2021    Centrilobular emphysema ([address] 10/17/2021    Pulmonary fibrosis ([address] 10/17/2021    Lung nodules 10/17/2021    DM (diabetes mellitus), secondary uncontrolled ([address] 10/17/2021    Elevated d-dimer 10/17/2021    Elevated ferritin level 10/17/2021    Calculus of gallbladder without cholecystitis without obstruction 10/17/2021    Acute respiratory failure due to COVID-19 [facility]) 10/16/2021    Atrial fibrillation with RVR ([address] 10/16/2021    COVID-19 10/10/2021    Renal calculi 06/01/2021    Chronic kidney disease (CKD), stage IV (severe) ([address] 09/22/2020    Chronic lymphocytic leukemia ([address] 01/15/2020    Essential tremor 11/14/2019    DM (diabetes mellitus), secondary, uncontrolled, w/neurologic complic ([address] 62/42/6423    HTN (hypertension), benign 11/14/2019    Dyslipidemia 11/14/2019    Lymphocytosis 07/01/2019    Chronic kidney disease, stage III (moderate) ([address] 07/01/2019    Right wrist pain 06/11/2019    Abrasion of right forearm 06/11/2019    Abrasion of left forearm 06/11/2019    Type 2 diabetes mellitus with hyperglycemia ([address] 12/21/2017    Bradycardia 07/08/2014    Diabetes mellitus ([address] 10/03/2012    Murmur, cardiac 10/03/2012    Thoracic compression fracture ([address] 10/03/2012     Overview Note:     S/p kyphplasty T 10, T12      MVP (mitral valve prolapse) 10/02/2012    Hyperlipemia 10/02/2012    BPH (benign prostatic hyperplasia) 10/02/2012    IGT (impaired glucose tolerance) 48/09/7233    Dysmetabolic syndrome X 39/54/0466    Benign essential tremor 10/02/2012    HTN (hypertension) 10/02/2012    Hematuria 10/02/2012    Renal insufficiency 10/02/2012     Overview Note:     Released by Nephrology.  Recommend bmp and urine studies q 6 months         COVID-19 and acute hypoxemic respiratory failure   Fully vaccinated x2 doses, immunocompromised host with hypogammaglobulinemia failed to mount adequate response to vaccines   Date of symptom onset ~10/4/21  Date of diagnosis and admission 10/10/21  Moderate elevation CRP, declining at last assessment   Worsening hypoxemia despite steroid    High risk of morbidity and mortality given age and comorbidities   -steroid, day 10/10 today, I would continue for now   -continue baricitinib, dose reduced for CKD  -DVT ppx  -outside window of benefit from mab, antiviral therapy   -isolation for 20d from symptom onset      CLL     CKD at baseline   GFR ~21     SON[place]            James Hare MD  Phone: [phone]   Fax : [phone]

## 2021-10-21 NOTE — PLAN OF CARE
Problem: Nutrition  Goal: Optimal nutrition therapy  10/21/2021 1433 by Nancy Powell  Outcome: Ongoing  Note: Nutrition Problem #1: Inadequate energy intake  Intervention: Food and/or Nutrient Delivery: Continue Current Diet, Modify Oral Nutrition Supplement  Nutritional Goals: Consume 50% or greater of 3 meals per day and ONS during this admission.    10/21/2021 0922 by Heidi Lamas RN  Outcome: Ongoing  10/21/2021 0114 by Hiren Rodriguez RN  Outcome: Ongoing

## 2021-10-21 NOTE — PROGRESS NOTES
INPATIENT PULMONARY CRITICAL CARE PROGRESS NOTE      Reason for visit    COVID 19 pneumonia requiring 10 L oxygen now    SUBJECTIVE: Patient continues to be hypoxemic and was on on 40 L flow rate and 100% oxygen on vapotherm with SaO2 of 88%%  patient does have some increase in the work of breathing, patient was afebrile, patient had sinus rhythm on the monitor  patient's blood sugars are still  not controlled, patient's documented urine o/p is low but may not be objective  patient's PO intake as documenetd is low , no other pertinent review of system of concern         Physical Exam:  Blood pressure 122/67, pulse 86, temperature 97.8 °F (36.6 °C), temperature source Axillary, resp. rate 22, height 5' 8\" (1.727 m), weight 128 lb 4.9 oz (58.2 kg), SpO2 (!) 88 %.'     In-person bedside physical examination deferred.  Pursuant to the emergency declaration under the [address] [place] [address] and the [employer] and [employer] Act, this clinical encounter was conducted to provide necessary medical care.   (Also consistent with new provisions and guidance offered by [facility] on March 18, 2020 in setting of COVID 19 outbreak and in order to preserve personal protective equipment in accordance with the flexibilities announced by CMS on March 30, 2020)   References: https://[facility]. [facility]/Portals/0/Resources/COVID-19/3_18%20Telemed%20Guidance%20Updated%20March%2018. pdf?vbl=[email or web address]                      https://[facility]. [facility]/Portals/0/Resources/COVID-19/3_18%20Telemed%20Guidance%20Updated%20March%2018. pdf?[email or web address]  [email or web address]. pdf         Results:  CBC:   Recent Labs     10/19/21  0526 10/20/21  0535 10/21/21  0535   WBC 26.2* 35.8* 31.9*   HGB 12.7* 13.8 12.7*   HCT 39.5* 42.7 39.2*   MCV 89.7 88.6 86.8    259 203 BMP:   Recent Labs     10/19/21  0526 10/20/21  0535 10/21/21  0534   * 141 135*   K 5.7* 5.3* 4.8    107 102   CO2 16* 20* 23   BUN 60* 70* 66*   CREATININE 1.9* 2.0* 2.2*     LIVER PROFILE:   Recent Labs     10/19/21  0526 10/20/21  0535 10/21/21  0534   AST 37 32 38*   ALT 34 33 37   BILITOT 1.0 0.9 0.7   ALKPHOS 95 93 98       Imaging:  I have reviewed radiology images personally. XR CHEST PORTABLE   Final Result   Diffuse interstitial opacity bilaterally is not significantly changed as   compared to prior. VL Extremity Venous Bilateral   Final Result      XR THORACIC SPINE (MIN 4 VIEWS)   Final Result   Multiple old compression fractures status post vertebral body enhancement T10   and T12      No acute fracture definitely identified         XR CHEST PORTABLE   Final Result   No acute cardiopulmonary disease      Stable interstitial opacities, likely fibrotic. Results for Pramod Gunderson (MRN [id number]) as of 10/21/2021 13:42   Ref. Range 10/20/2021 05:35 10/20/2021 07:28 10/20/2021 11:21 10/20/2021 16:23 10/20/2021 19:27   Sodium Latest Ref Range: 136 - 145 mmol/L 141       Potassium Latest Ref Range: 3.5 - 5.1 mmol/L 5.3 (H)       Chloride Latest Ref Range: 99 - 110 mmol/L 107       CO2 Latest Ref Range: 21 - 32 mmol/L 20 (L)       BUN Latest Ref Range: 7 - 20 mg/dL 70 (H)       Creatinine Latest Ref Range: 0.8 - 1.3 mg/dL 2.0 (H)       Anion Gap Latest Ref Range: 3 - 16  14       GFR Non- Latest Ref Range: >60  32 (A)       GFR  Latest Ref Range: >60  39 (A)       Glucose Latest Ref Range: 70 - 99 mg/dL 202 (H)       POC Glucose Latest Ref Range: 70 - 99 mg/dl  209 (H) 365 (H) 299 (H) 252 (H)   Calcium Latest Ref Range: 8.3 - 10.6 mg/dL 9.0       Total Protein Latest Ref Range: 6.4 - 8.2 g/dL 6.9       Results for Pramod Gunderson (MRN [id number]) as of 10/21/2021 13:42   Ref.  Range 10/17/2021 05:18 10/18/2021 07:18 10/19/2021 05:26 10/20/2021 05:35 10/21/2021 05:35   WBC Latest Ref Range: 4.0 - 11.0 K/uL 35.8 (H) 34.0 (H) 26.2 (H) 35.8 (H) 31.9 (H)   RBC Latest Ref Range: 4.20 - 5.90 M/uL 4.25 4.61 4.40 4.82 4.51   Hemoglobin Quant Latest Ref Range: 13.5 - 17.5 g/dL 12.1 (L) 13.2 (L) 12.7 (L) 13.8 12.7 (L)   Hematocrit Latest Ref Range: 40.5 - 52.5 % 36.7 (L) 40.6 39.5 (L) 42.7 39.2 (L)   MCV Latest Ref Range: 80.0 - 100.0 fL 86.5 88.1 89.7 88.6 86.8   MCH Latest Ref Range: 26.0 - 34.0 pg 28.6 28.6 28.8 28.6 28.2   MCHC Latest Ref Range: 31.0 - 36.0 g/dL 33.0 32.5 32.1 32.3 32.4   MPV Latest Ref Range: 5.0 - 10.5 fL 8.1 7.6 8.3 8.1 8.3   RDW Latest Ref Range: 12.4 - 15.4 % 16.6 (H) 16.6 (H) 17.3 (H) 17.2 (H) 16.9 (H)   Platelet Count Latest Ref Range: 135 - 450 K/uL 193 222 182 259 203   Neutrophils % Latest Units: % 26.0 38.0 37.0 28.0 36.0   Lymphocyte % Latest Units: % 72.0 58.0 60.0 72.0 63.0   Monocytes % Latest Units: % 2.0 0.0 1.0 0.0 1.0   Eosinophils % Latest Units: % 0.0 0.0 0.0 0.0 0.0   Basophils % Latest Units: % 0.0 0.0 0.0 0.0 0.0   Neutrophils Absolute Latest Ref Range: 1.7 - 7.7 K/uL 9.3 (H) 14.3 (H) 10.2 (H) 10.0 (H) 11.5 (H)   Lymphocytes Absolute Latest Ref Range: 1.0 - 5.1 K/uL 25.8 (H) 19.7 (H) 15.7 (H) 25.8 (H) 20.1 (H)         Assessment:  Principal Problem:    COVID-19  Active Problems:    Chronic kidney disease (CKD), stage IV (severe) (HCC)    Chronic lymphocytic leukemia (HCC)    Atrial fibrillation with RVR (HCC)    Centrilobular emphysema (HCC)    Pulmonary fibrosis (HCC)    Lung nodules    DM (diabetes mellitus), secondary uncontrolled (HCC)    Calculus of gallbladder without cholecystitis without obstruction    Hyperkalemia  Resolved Problems:    * No resolved hospital problems.  *          Plan:   · O2 supplementation to keep SaO2 between 90-94% ONLY  · Please titrate oxygen as per above parameters   · Patient's hypoxemia has worsened and patient is now on vapotherm oxygen  to maintain oxygen saturation · Please monitor for any worsening hypoxemia and respiratory failure which may warrant non invasive or invasive ventioaltion   · IV solumedrol to continue  · Patient's lantus insulin was changed to 40units BID  · Titration of lantus insulin as per BGM  · Patient is now in sinus rhythm on PO cardizem and digoxin  · Venous doppler has been  negative for any DVT   · PO Eliquis to continue  · Monitor for any bleeding  · Baricitinib started by ID at low dose secondary to renal faiilure   · Patient's BUN and creatinine is worsening   · Lekocytosis secondary to CLL  · Symbicort to continue   · Trend the inflammatory markers   · Monitor I/O and BMP  · Correct electrolytes on PRN basis   · Lokelma ordered -if Ok with IM   · PUD prophylaxis started     Patient's clinical status is precarious and has potential for decompensation and needs to be monitored closely in progressive care unit     IM to have advanced care planning discussions with the patient,if deemed appropriate;to discuss the code status-if patient has worsening respiratory status and needs mechanical ventilatory support -it may be prolonged intubation and also may not be able to be extubated given the underlying COPD /pulmonary fibrosis ,in addition to [place] 19-discussed with nursing  Palliative care consult pending         Electronically signed by:  Jhonny Cheung MD    10/21/2021    1:39 PM.

## 2021-10-21 NOTE — CONSULTS
Palliative Care Initial Note  Palliative Care Admit date:  10/21/21  Reason for c/s:  GOC, Code status    Advance Directives:  Pt does not have AD's. In the absence of a HCPOA, pts son's, Jomar Cordova, are viewed as his collective NOK. Marlys Peters is listed as the primary contact. Pt currently has a 'limited' code status to reflect NO chest compressions. All other resuscitative measures remain; this was determined in a c/w b/t pt and Dr. Carrillo Gains though son stated to writer that he was surprised that pt had agreed to intubation/MV support. Plan of care/goals:  After multiple attempts, able to speak w/ pt via phone. His voice was weak though he participated well and was very appropriate. We reviewed the SLP findings and rec's, which pt was aware of, though disappointed to be NPO. We reviewed his code decisions from 10/14 w/ the hospitalist.  Pt affirmed that he did not wish to receive chest compressions. If his resp status were to worsen, we also discussed the potential for prolonged intubation and even inability to extubate in which case, his family would be looked to for decisions around w/d of care. Mr. Catalino Serna affirmed that he did, indeed, wish to be intubated if necessary and understood family would then be making EOL decisions. Pt requested that writer inform Marlys Peters of this discussion. Spoke w/ Marlys Peters who was appreciative for the opportunity to review pts medical challenges per provider input. He can appreciate concerns, if pt were to be intubated, that \"prolonged intubation\" or inability to be extubated are risks in light of pts significant co-morbidities. We also reviewed the SLP findings and rec's from today. Currently, an alternate form of nutrition is unlikely given FiO2 need so pts nutritional status could become compromised and he become weaker.        Social/Spiritual:  Marlys Peters has confidence that pt \"knows full well what we're talking about and what he wants b/c he worked in [employer] @ [employer] [facility] [employer] [facility] for 30-something years. \"    Plan:  Continue to follow and aruna/family very receptive to ongoing writer f/u. Maintain present course and code status for now.         Reason for consult:  _X_ Advance Care Planning  ___ Transition of Care Planning  ___ Psychosocial/Spiritual Support  ___ Symptom Management                                                                                                                          Randolph Welch, RN

## 2021-10-21 NOTE — TELEPHONE ENCOUNTER
Left a voice mail letting patient know that we have cancelled his appointment with Dr. Sandra Godwin on 1/26/22 and his 6/30/22 appointment with Edel Aldridge. Instructed patient to call office to discuss options on locating a new PCP. If patient stays with [facility] please reschedule his 1/26/22 and 6/30/22 appt.

## 2021-10-21 NOTE — PROGRESS NOTES
Speech Language Pathology  Facility/Department: [place] [address] [facility]   CLINICAL BEDSIDE SWALLOW EVALUATION    NAME: Jarod Smith  : 1935  MRN: [id number]    ADMISSION DATE: 10/10/2021  ADMITTING DIAGNOSIS: has MVP (mitral valve prolapse); Hyperlipemia; BPH (benign prostatic hyperplasia); IGT (impaired glucose tolerance); Dysmetabolic syndrome X; Benign essential tremor; HTN (hypertension); Hematuria; Renal insufficiency; Diabetes mellitus ([address] Murmur, cardiac; Thoracic compression fracture ([address] Bradycardia; Type 2 diabetes mellitus with hyperglycemia ([address] Right wrist pain; Abrasion of right forearm; Abrasion of left forearm; Lymphocytosis; Chronic kidney disease, stage III (moderate) ([address] Essential tremor; DM (diabetes mellitus), secondary, uncontrolled, w/neurologic complic ([address] HTN (hypertension), benign; Dyslipidemia; Chronic kidney disease (CKD), stage IV (severe) ([address] Renal calculi; Chronic lymphocytic leukemia ([address] COVID-19; Acute respiratory failure due to COVID-19 [facility]); Atrial fibrillation with RVR ([address] Centrilobular emphysema ([address] Pulmonary fibrosis ([address] Lung nodules; DM (diabetes mellitus), secondary uncontrolled ([address] Elevated d-dimer; Elevated ferritin level; Calculus of gallbladder without cholecystitis without obstruction; and Hyperkalemia on their problem list.  ONSET DATE: admit date 10/10/21    Recent Chest Xray: (Date 10/20/21)   Diffuse interstitial opacity bilaterally is not significantly changed as   compared to prior. Date of Eval: 10/21/2021  Evaluating Therapist: TARA Osorio     Vitals/labs:   Temp: 97.6 (axial, right arm)  SpO2: 87% on 40lpm O2, 100% FiO2  RR: 24/min  BP: 117/62  HR: 97        Current Diet level:  Current Diet : Regular  Current Liquid Diet : Thin      Primary Complaint  Patient Complaint: \"I'm nervous to try liquids. They're choking me. \"    Pain:  Pain Assessment  Pain Assessment: 0-10  Pain Level: 0  Patient's Stated Pain Goal: No pain    Reason for Referral  Mayda Robert was referred for a bedside swallow evaluation to assess the efficiency of his swallow function, identify signs and symptoms of aspiration and make recommendations regarding safe dietary consistencies, effective compensatory strategies, and safe eating environment. Impression  Dysphagia Diagnosis: Suspected needs further assessment;Severe pharyngeal stage dysphagia  Dysphagia Outcome Severity Scale: Level 2: Moderate Severe dysphagia- Maximum assistance or maximum use of strategies with partial PO only     Treatment Plan  Requires SLP Intervention: Yes  Duration/Frequency of Treatment: 3x/wk for 1 week until 10/28/21          Recommended Diet and Intervention  Diet Solids Recommendation: NPO     Recommended Form of Meds: Via alternative means of nutrition  Recommendations: NPO;Consider alternative nutrition; Therapeutic feeds, pureed food pleasure feeds with SLP/RN only    Peak airway pressure in patient's receiving 35 L/min or greater of O2 on HFNC can place patient at increased risk for food/liquid being blown into the airway and increase the risk of aspiration, especially in patient's with past hx of swallow dysfunction. Recommend NPO at this time. SLP to continue to reassess appropriateness for PO intake as O2 demands decrease. Consider trialing standard nasal cannula at lower flow rate for the duration of a meal (20 mins) to decrease pharyngeal/airway pressure and increase swallow safety during PO intake. Compensatory Swallowing Strategies  ·  Pasted toothbrushing with suction, 2x/day  · Small bites of puree, x 2 oz, small bites    Treatment/Goals  Short-term Goals  Timeframe for Short-term Goals: 1 week, 3x/wk until 10/28/21  Long-term Goals  Timeframe for Long-term Goals: 1-2 weeks  Goal 1: Pt will tolerate ongoing assessment of swallowing at bedside. Dysphagia Goals: The patient will tolerate repeat BSE when able.      General  Chart Reviewed: Yes  Behavior/Cognition: Alert; Cooperative;Pleasant mood  O2 Device: High flow nasal cannula  Liters of Oxygen:  (40lpm O2, 100% FiO2)  Communication Observation: Functional  Dentition: Adequate; Some missing teeth (partial denture on bottom)  Patient Positioning: Upright in bed  Baseline Vocal Quality: Weak;Hoarse  Volitional Cough: Congested  Prior Dysphagia History: Per RN, pt has been displaying s/s of aspiration with liquids this date, but is possibly tolerating sips of [place] better than true thin liquids. Consistencies Administered: Dysphagia Minced and Moist (Dysphagia II); Dysphagia Pureed (Dysphagia I); Nectar - teaspoon;Nectar - cup;Honey - teaspoon;Honey - cup; Thin - teaspoon; Thin - cup         Vision/Hearing  Vision  Vision: Impaired  Vision Exceptions: Wears glasses at all times  Hearing  Hearing: Exceptions to [facility]  Hearing Exceptions: Bilateral hearing aid    Oral Motor Deficits  Oral/Motor  Oral Motor: Within functional limits    Oral Phase Dysfunction  Oral Phase  Oral Phase: WFL  Oral Phase  Oral Phase - Comment: Oral phase is WFL for pureed, soft food trials. No anterior loss noted with any po trials. Mastication WFL for soft food textures. No oral residuals post swallow. Indicators of Pharyngeal Phase Dysfunction   Pharyngeal Phase  Pharyngeal Phase: Exceptions  Indicators of Pharyngeal Phase Dysfunction  Wet Vocal Quality: Honey - teaspoon;Nectar - teaspoon  Cough - Immediate: Nectar - cup  Cough - Delayed: Honey - teaspoon;Honey - cup  Pharyngeal Phase   Pharyngeal: RR 24/min prior to po trials with O2 sat 87% on 40lpm, FiO2 100% Vapotherm. No overt s/s of aspiration noted with pureed food trials, soft food trials. Pt declined thin liquid trials, stating: \"They'll choke me up. \" Suspected aspiration of nectar thick liquids with immediate wet coughing post swallow. O2 desat to 84%.  Suspected possible laryngeal peentration versus aspiration of honey thick liquid trials with pt displaying immediate wet throat clearing post swallow, delayed wet coughing. RR increase to 28/min and O2 sat at 89%. Prognosis  Prognosis  Prognosis for safe diet advancement: guarded  Barriers to reach goals: severity of dysphagia  Barriers/Prognosis Comment: High O2 requirements  Individuals consulted  Consulted and agree with results and recommendations: Patient;RN    Education  Patient Education: Dysphagia Tx: Education wiht pt and RN regarding results of BSE, aspiraiton precautions, risks of aspiration wiht Vapotherm, SLP plan of care  Patient Education Response: Verbalizes understanding;Needs reinforcement  Safety Devices in place: Yes  Type of devices: All fall risk precautions in place; Bed alarm in place;Call light within reach       Therapy Time  SLP Individual Minutes  Time In: 1401  Time Out: [address]  Minutes: [facility]  Jacinto Chung, [address] [id number]  Speech-Language Pathologist      10/21/2021 3:07 PM

## 2021-10-21 NOTE — PROGRESS NOTES
10/21/21 1540   Oxygen Therapy/Pulse Ox   O2 Therapy Oxygen humidified   O2 Device Heated high flow cannula  (w/NRB)   O2 Flow Rate (L/min) 40 L/min   FiO2  100 %   SpO2 90 %

## 2021-10-21 NOTE — PLAN OF CARE
Problem: Falls - Risk of:  Goal: Will remain free from falls  Description: Will remain free from falls  Outcome: Ongoing  Goal: Absence of physical injury  Description: Absence of physical injury  Outcome: Ongoing     Problem: Airway Clearance - Ineffective  Goal: Achieve or maintain patent airway  Outcome: Ongoing     Problem: Gas Exchange - Impaired  Goal: Absence of hypoxia  Outcome: Ongoing  Goal: Promote optimal lung function  Outcome: Ongoing     Problem: Breathing Pattern - Ineffective  Goal: Ability to achieve and maintain a regular respiratory rate  Outcome: Ongoing     Problem:  Body Temperature -  Risk of, Imbalanced  Goal: Ability to maintain a body temperature within defined limits  Outcome: Ongoing  Goal: Will regain or maintain usual level of consciousness  Outcome: Ongoing  Goal: Complications related to the disease process, condition or treatment will be avoided or minimized  Outcome: Ongoing     Problem: Isolation Precautions - Risk of Spread of Infection  Goal: Prevent transmission of infection  Outcome: Ongoing     Problem: Nutrition Deficits  Goal: Optimize nutritional status  Outcome: Ongoing     Problem: Risk for Fluid Volume Deficit  Goal: Maintain normal heart rhythm  Outcome: Ongoing  Goal: Maintain absence of muscle cramping  Outcome: Ongoing  Goal: Maintain normal serum potassium, sodium, calcium, phosphorus, and pH  Outcome: Ongoing     Problem: Loneliness or Risk for Loneliness  Goal: Demonstrate positive use of time alone when socialization is not possible  Outcome: Ongoing     Problem: Fatigue  Goal: Verbalize increase energy and improved vitality  Outcome: Ongoing     Problem: Patient Education: Go to Patient Education Activity  Goal: Patient/Family Education  Outcome: Ongoing     Problem: Skin Integrity:  Goal: Will show no infection signs and symptoms  Description: Will show no infection signs and symptoms  Outcome: Ongoing  Goal: Absence of new skin breakdown  Description: Absence of new skin breakdown  Outcome: Ongoing     Problem: Nutrition  Goal: Optimal nutrition therapy  Outcome: Ongoing     Problem: Pain:  Goal: Pain level will decrease  Description: Pain level will decrease  Outcome: Ongoing  Goal: Control of acute pain  Description: Control of acute pain  Outcome: Ongoing  Goal: Control of chronic pain  Description: Control of chronic pain  Outcome: Ongoing

## 2021-10-21 NOTE — PLAN OF CARE
Problem: Falls - Risk of:  Goal: Will remain free from falls  Description: Will remain free from falls  10/21/2021 0922 by Dieudonne Bryant RN  Outcome: Ongoing  10/21/2021 0114 by Ale Park RN  Outcome: Ongoing  Goal: Absence of physical injury  Description: Absence of physical injury  10/21/2021 0922 by Dieudonne Bryant RN  Outcome: Ongoing  10/21/2021 0114 by Ale Park RN  Outcome: Ongoing     Problem: Airway Clearance - Ineffective  Goal: Achieve or maintain patent airway  10/21/2021 0922 by Dieudonne Bryant RN  Outcome: Ongoing  10/21/2021 0114 by Ale Park RN  Outcome: Ongoing     Problem: Gas Exchange - Impaired  Goal: Absence of hypoxia  10/21/2021 0922 by Dieudonne Bryant RN  Outcome: Ongoing  10/21/2021 0114 by Ale Park RN  Outcome: Ongoing  Goal: Promote optimal lung function  10/21/2021 0922 by Dieudonne Bryant RN  Outcome: Ongoing  10/21/2021 0114 by Ale Park RN  Outcome: Ongoing     Problem: Breathing Pattern - Ineffective  Goal: Ability to achieve and maintain a regular respiratory rate  10/21/2021 0922 by Dieudonne Bryant RN  Outcome: Ongoing  10/21/2021 0114 by Ale Park RN  Outcome: Ongoing     Problem:  Body Temperature -  Risk of, Imbalanced  Goal: Ability to maintain a body temperature within defined limits  10/21/2021 0922 by Dieudonne Bryant RN  Outcome: Ongoing  10/21/2021 0114 by Ale Park RN  Outcome: Ongoing  Goal: Will regain or maintain usual level of consciousness  10/21/2021 0922 by Dieudonne Bryant RN  Outcome: Ongoing  10/21/2021 0114 by Ale Park RN  Outcome: Ongoing  Goal: Complications related to the disease process, condition or treatment will be avoided or minimized  10/21/2021 0922 by Dieudonne Bryant RN  Outcome: Ongoing  10/21/2021 0114 by Ale Park RN  Outcome: Ongoing     Problem: Isolation Precautions - Risk of Spread of Infection  Goal: Prevent transmission of infection  10/21/2021 0922 by Dieudonne Bryant RN  Outcome: Ongoing  10/21/2021 0114 by Ale Park RN  Outcome: Ongoing Problem: Nutrition Deficits  Goal: Optimize nutritional status  10/21/2021 0922 by Rosa Gee RN  Outcome: Ongoing  10/21/2021 0114 by Vane Saunders RN  Outcome: Ongoing     Problem: Risk for Fluid Volume Deficit  Goal: Maintain normal heart rhythm  10/21/2021 0922 by Rosa Gee RN  Outcome: Ongoing  10/21/2021 0114 by Vane Saunders RN  Outcome: Ongoing  Goal: Maintain absence of muscle cramping  10/21/2021 0922 by Rosa Gee RN  Outcome: Ongoing  10/21/2021 0114 by Vane Saunders RN  Outcome: Ongoing  Goal: Maintain normal serum potassium, sodium, calcium, phosphorus, and pH  10/21/2021 0922 by Rosa Gee RN  Outcome: Ongoing  10/21/2021 0114 by Vane Saunders RN  Outcome: Ongoing     Problem: Loneliness or Risk for Loneliness  Goal: Demonstrate positive use of time alone when socialization is not possible  10/21/2021 0922 by Rosa Gee RN  Outcome: Ongoing  10/21/2021 0114 by Vane Saunders RN  Outcome: Ongoing     Problem: Fatigue  Goal: Verbalize increase energy and improved vitality  10/21/2021 0922 by Rosa Gee RN  Outcome: Ongoing  10/21/2021 0114 by Vane Saunders RN  Outcome: Ongoing     Problem: Patient Education: Go to Patient Education Activity  Goal: Patient/Family Education  10/21/2021 [id number] by Rsoa Gee RN  Outcome: Ongoing  10/21/2021 0114 by Vane Saunders RN  Outcome: Ongoing     Problem: Skin Integrity:  Goal: Will show no infection signs and symptoms  Description: Will show no infection signs and symptoms  10/21/2021 0922 by Rosa Gee RN  Outcome: Ongoing  10/21/2021 0114 by Vane Saunders RN  Outcome: Ongoing  Goal: Absence of new skin breakdown  Description: Absence of new skin breakdown  10/21/2021 0922 by Rosa Gee RN  Outcome: Ongoing  10/21/2021 0114 by Vane Saunders RN  Outcome: Ongoing     Problem: Nutrition  Goal: Optimal nutrition therapy  10/21/2021 0922 by Rosa Gee RN  Outcome: Ongoing  10/21/2021 0114 by Vane Saunders RN  Outcome: Ongoing     Problem: Pain:  Goal: Pain level will decrease  Description: Pain level will decrease  10/21/2021 0922 by Lisa Aragon RN  Outcome: Ongoing  10/21/2021 0114 by Patricia Hernandez RN  Outcome: Ongoing  Goal: Control of acute pain  Description: Control of acute pain  10/21/2021 0922 by Lisa Aragon RN  Outcome: Ongoing  10/21/2021 0114 by Patricia Hernandez RN  Outcome: Ongoing  Goal: Control of chronic pain  Description: Control of chronic pain  10/21/2021 0922 by Lisa Aragon RN  Outcome: Ongoing  10/21/2021 0114 by Patricia Hernandez RN  Outcome: Ongoing

## 2021-10-21 NOTE — PROGRESS NOTES
Comprehensive Nutrition Assessment    Type and Reason for Visit:  Initial, RD Nutrition Re-Screen/LOS    Nutrition Recommendations/Plan:   1. Continue minced and moist diet. 2. Start Glucerna TID   3. Encourage po intakes  4. Monitor po intakes, nutrition adequacy, weights, pertinent labs, BMs      Nutrition Assessment:  Follow up: Pt remains in droplet plus precautions. Pt on vapotherm 40L/min. Unble to speak with pt. Continues on dysphagia- minced and moist diet. Pt nurse reported poor po intakes. 1-25% intakes per EMR. ONS in place once daily, pt favorable AEB % intakes per EMR - frequency increased to TID d/t decrease in intake. Wt loss per EMR. Will continue to monitor. Malnutrition Assessment:  Malnutrition Status: At risk for malnutrition (Comment)    Context:  Chronic Illness       Estimated Daily Nutrient Needs:  Energy (kcal):  1374-7478 kcals/day; Weight Used for Energy Requirements:  Ideal (70 kg)     Protein (g):  70-98 g/day; Weight Used for Protein Requirements:  Ideal (1-1.4 g/kg)         Method Used for Fluid Requirements:  1 ml/kcal      Nutrition Related Findings:  BG = 201-365mg/dL x24hr - steroid use, insulin regimen adjusted today. BM x5 on 10/16. Na 135. Wounds:  None (Skin tears and redness)       Current Nutrition Therapies:    Adult Oral Nutrition Supplement; Diabetic Oral Supplement  ADULT DIET;  Dysphagia - Minced and Moist    Anthropometric Measures:  · Height: 5' 8\" (172.7 cm)  · Current Body Weight: 128 lb 4.9 oz (58.2 kg)     · Ideal Body Weight: 154 lbs; % Ideal Body Weight 90.9 %   · BMI: 19.5   · BMI Categories: Underweight (BMI less than 22) age over 72       Nutrition Diagnosis:   · Inadequate energy intake related to impaired respiratory function as evidenced by weight loss, intake 0-25%      Nutrition Interventions:   Food and/or Nutrient Delivery:  Continue Current Diet, Continue Oral Nutrition Supplement  Nutrition Education/Counseling:  No recommendation at this time   Coordination of Nutrition Care:  Continue to monitor while inpatient    Goals:  Consume 50% or greater of 3 meals per day and ONS during this admission.        Nutrition Monitoring and Evaluation:   Behavioral-Environmental Outcomes:  None Identified   Food/Nutrient Intake Outcomes:  Food and Nutrient Intake, Supplement Intake  Physical Signs/Symptoms Outcomes:  Nutrition Focused Physical Findings, Weight, Biochemical Data     Discharge Planning:    Continue Oral Nutrition Supplement, Continue current diet     Electronically signed by Elliot Saravia on 10/21/21 at 2:27 PM EDT    Contact: [phone]

## 2021-10-21 NOTE — PROGRESS NOTES
Hospitalist Progress Note      PCP: No primary care provider on file. Date of Admission: 10/10/2021    Chief Complaint: Recurrent falls w/ SOB. Subjective: no new c/o. Medications:  Reviewed    Infusion Medications    sodium chloride      dextrose       Scheduled Medications    insulin glargine  35 Units SubCUTAneous BID    baricitinib  1 mg Oral Daily    budesonide-formoterol  2 puff Inhalation BID    pantoprazole  40 mg Oral QAM AC    apixaban  2.5 mg Oral BID    digoxin  0.0625 mg Oral Q48H    dilTIAZem  120 mg Oral Daily    influenza virus vaccine  0.5 mL IntraMUSCular Prior to discharge    insulin lispro  0-18 Units SubCUTAneous TID WC    insulin lispro  0-9 Units SubCUTAneous Nightly    aspirin  81 mg Oral Daily    escitalopram  10 mg Oral Daily    rosuvastatin  40 mg Oral Daily    tamsulosin  0.4 mg Oral Daily    sodium chloride flush  5-40 mL IntraVENous 2 times per day    methylPREDNISolone  40 mg IntraVENous Q12H     PRN Meds: guaiFENesin-dextromethorphan, levalbuterol, perflutren lipid microspheres, prochlorperazine, sodium chloride flush, sodium chloride, polyethylene glycol, acetaminophen **OR** acetaminophen, glucose, dextrose, glucagon (rDNA), dextrose      Intake/Output Summary (Last 24 hours) at 10/21/2021 0918  Last data filed at 10/21/2021 0839  Gross per 24 hour   Intake 240 ml   Output 100 ml   Net 140 ml       Physical Exam Performed:    /68   Pulse 89   Temp 97.6 °F (36.4 °C) (Axillary)   Resp 22   Ht 5' 8\" (1.727 m)   Wt 128 lb 4.9 oz (58.2 kg)   SpO2 95%   BMI 19.51 kg/m²     General appearance: No apparent distress, appears stated age and cooperative. HEENT: Pupils equal, round, and reactive to light. Conjunctivae/corneas clear. Neck: Supple, with full range of motion. No jugular venous distention. Trachea midline. Respiratory:  Normal respiratory effort. Clear to auscultation, bilaterally without Rales/Wheezes/Rhonchi.   Cardiovascular: Regular rate and rhythm with normal S1/S2 without murmurs, rubs or gallops. Abdomen: Soft, non-tender, non-distended with normal bowel sounds. Musculoskeletal: No clubbing, cyanosis or edema bilaterally. Full range of motion without deformity. Skin: Skin color, texture, turgor normal.  No rashes or lesions. Neurologic:  Neurovascularly intact without any focal sensory/motor deficits. Cranial nerves: II-XII intact, grossly non-focal.  Psychiatric: Alert and oriented, thought content appropriate, normal insight  Capillary Refill: Brisk,< 3 seconds   Peripheral Pulses: +2 palpable, equal bilaterally       Labs:   Recent Labs     10/19/21  0526 10/20/21  0535 10/21/21  0535   WBC 26.2* 35.8* 31.9*   HGB 12.7* 13.8 12.7*   HCT 39.5* 42.7 39.2*    259 203     Recent Labs     10/19/21  0526 10/20/21  0535 10/21/21  0534   * 141 135*   K 5.7* 5.3* 4.8    107 102   CO2 16* 20* 23   BUN 60* 70* 66*   CREATININE 1.9* 2.0* 2.2*   CALCIUM 8.7 9.0 8.7     Recent Labs     10/19/21  0526 10/20/21  0535 10/21/21  0534   AST 37 32 38*   ALT 34 33 37   BILITOT 1.0 0.9 0.7   ALKPHOS 95 93 98     No results for input(s): INR in the last 72 hours. No results for input(s): Elva Horn in the last 72 hours.     Urinalysis:      Lab Results   Component Value Date    NITRU Negative 10/10/2021    WBCUA 0-2 10/10/2021    BACTERIA 2+ 01/02/2020    RBCUA None seen 10/10/2021    BLOODU LARGE 10/10/2021    SPECGRAV 1.025 10/10/2021    GLUCOSEU Negative 10/10/2021       Consults:    IP CONSULT TO HOSPITALIST  IP CONSULT TO ONCOLOGY  IP CONSULT TO INFECTIOUS DISEASES  IP CONSULT TO CARDIOLOGY  IP CONSULT TO PULMONOLOGY  IP CONSULT TO PALLIATIVE CARE      Assessment/Plan:    Active Hospital Problems    Diagnosis     Hyperkalemia [E87.5]     Centrilobular emphysema ([address] [J43.2]     [facility] [J84.10]     Lung nodules [R91.8]     DM (diabetes mellitus), secondary uncontrolled ([address] [E13.65]     Calculus of gallbladder without cholecystitis without obstruction [K80.20]     Atrial fibrillation with RVR (HCC) [I48.91]     COVID-19 [U07.1]     Chronic kidney disease (CKD), stage IV (severe) (HCC) [N18.4]     Chronic lymphocytic leukemia (HCC) [C91.10]          COVID 19 Positive - NAAT positive on 10 October w/ Sxs onset 4 October. Possible unconfirmed OSF positive test 4 October. IV steroids. Baricitinib started 19 October per Infectious Disease - consulted and appreciated. Planned isolation through 24 October. PNA - likely Acute Viral Pneumonia 2nd to above. Started on empiric tx as above. Acute Respiratory Failure - w/ hypoxia, POArrival.  Presence of clinical respiratory distress w/ tachypnea/dyspnea/SOB and wheezing w/ use of accessory muscles to breath. Supplemental O2 and wean as tolerated. Pulmonology consulted and appreciated. Afib - new onset w/ RVR, of unspecified and clinically unable to determine etiology. Not normally on rate control meds or anticoagulated at baseline. Monitored on tele. Cardiology consulted and appreciated, now off Dilt gtt on PO Dilt/Dig and Eliquis. Echo ordered 18 October and pending. Questionable Lung cancer - recent diagnosis per pt. Started work-up as outpt. Heme/Onc consulted and appreciated w/ recent CT chest noted fibrosis and nodules     CLL - w/ Lymphocytosis - chronically elevated WBCs. Monitor CBC. Oncology consulted (noted hx of CLL, no need for tx now, observation only). IgG sent by Oncology      HyperLipidemia - controlled on home Statin. Continue, w/ f/u and med adjustment w/ PCP     DM2- SSI and lantus. Monitor BS as will be elevated due to steroids and illness. A1c 7.5 July, 2021.      CKD - baseline stage 4. Follow serial labs. Reviewed and documented as above. 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 Follow serial labs. Reviewed and documented as above. Lokelma x 1 19 October, re-ordered 20 October.      Renal Osteodystrophy - continue Vit D.       DVT Prophylaxis: Eliquis    Recent Labs     10/19/21  0526 10/20/21  0535 10/21/21  0535    259 203     Diet: Adult Oral Nutrition Supplement; Diabetic Oral Supplement  ADULT DIET; Dysphagia - Minced and Moist  Code Status: Limited      PT/OT Eval Status: seen w/ recs for SNF. Dispo - Possibly to SNF Friday 22 October pending clinical course, subspecialty recs and placement decision.      Meena Gallegos MD

## 2021-10-22 NOTE — PROGRESS NOTES
10/22/21 1147   Oxygen Therapy/Pulse Ox   O2 Therapy Oxygen humidified   O2 Device Heated high flow cannula   O2 Flow Rate (L/min) 10 L/min   FiO2  100 %   SpO2 91 %

## 2021-10-22 NOTE — PROGRESS NOTES
10/22/21 0346   Oxygen Therapy/Pulse Ox   O2 Therapy Oxygen humidified   O2 Device Heated high flow cannula   O2 Flow Rate (L/min) 40 L/min   FiO2  100 %   SpO2 94 %   Humidification Source Heated wire   Humidification Temp 33

## 2021-10-22 NOTE — PROGRESS NOTES
Physical & Occupational  Therapy  Consulted with RN: agreed to hold physical & Occupational  therapy at this time d/t pt high O2 need; pt on Vapotherm 40L 100%. Will continue to follow. Thank you.   Shefali Dominguez 149 PTA  Nehal Aguirre OT

## 2021-10-22 NOTE — PROGRESS NOTES
Hospitalist Progress Note      PCP: No primary care provider on file. Date of Admission: 10/10/2021    Chief Complaint: Recurrent falls w/ SOB. Subjective: no new c/o. Medications:  Reviewed    Infusion Medications    sodium chloride      dextrose       Scheduled Medications    insulin glargine  40 Units SubCUTAneous BID    baricitinib  1 mg Oral Daily    budesonide-formoterol  2 puff Inhalation BID    pantoprazole  40 mg Oral QAM AC    apixaban  2.5 mg Oral BID    digoxin  0.0625 mg Oral Q48H    dilTIAZem  120 mg Oral Daily    influenza virus vaccine  0.5 mL IntraMUSCular Prior to discharge    insulin lispro  0-18 Units SubCUTAneous TID WC    insulin lispro  0-9 Units SubCUTAneous Nightly    aspirin  81 mg Oral Daily    escitalopram  10 mg Oral Daily    rosuvastatin  40 mg Oral Daily    tamsulosin  0.4 mg Oral Daily    sodium chloride flush  5-40 mL IntraVENous 2 times per day    methylPREDNISolone  40 mg IntraVENous Q12H     PRN Meds: guaiFENesin-dextromethorphan, levalbuterol, perflutren lipid microspheres, prochlorperazine, sodium chloride flush, sodium chloride, polyethylene glycol, acetaminophen **OR** acetaminophen, glucose, dextrose, glucagon (rDNA), dextrose      Intake/Output Summary (Last 24 hours) at 10/22/2021 0910  Last data filed at 10/22/2021 0400  Gross per 24 hour   Intake 120 ml   Output 1050 ml   Net -930 ml       Physical Exam Performed:    /69   Pulse 82   Temp 97.5 °F (36.4 °C) (Axillary)   Resp 22   Ht 5' 8\" (1.727 m)   Wt 125 lb 10.6 oz (57 kg)   SpO2 95%   BMI 19.11 kg/m²     General appearance: No apparent distress, appears stated age and cooperative. HEENT: Pupils equal, round, and reactive to light. Conjunctivae/corneas clear. Neck: Supple, with full range of motion. No jugular venous distention. Trachea midline. Respiratory:  Normal respiratory effort. Clear to auscultation, bilaterally without Rales/Wheezes/Rhonchi.   Cardiovascular: Calculus of gallbladder without cholecystitis without obstruction [K80.20]     Atrial fibrillation with RVR (HCC) [I48.91]     COVID-19 [U07.1]     Chronic kidney disease (CKD), stage IV (severe) (HCC) [N18.4]     Chronic lymphocytic leukemia (HCC) [C91.10]          COVID 19 Positive - NAAT positive on 10 October w/ Sxs onset 4 October. Possible unconfirmed OSF positive test 4 October. IV steroids. Baricitinib started 19 October per Infectious Disease - consulted and appreciated. Planned isolation through 24 October. PNA - likely Acute Viral Pneumonia 2nd to above. Started on empiric tx as above. Acute Respiratory Failure - w/ hypoxia, POArrival.  Presence of clinical respiratory distress w/ tachypnea/dyspnea/SOB and wheezing w/ use of accessory muscles to breath. Supplemental O2 and wean as tolerated. Pulmonology consulted and appreciated. Afib - new onset w/ RVR, of unspecified and clinically unable to determine etiology. Not normally on rate control meds or anticoagulated at baseline. Monitored on tele. Cardiology consulted and appreciated, now off Dilt gtt on PO Dilt/Dig and Eliquis. Echo ordered 18 October and done 21 October w/ preserved EF. Questionable Lung cancer - recent diagnosis per pt. Started work-up as outpt. Heme/Onc consulted and appreciated w/ recent CT chest noted fibrosis and nodules     CLL - w/ Lymphocytosis - chronically elevated WBCs. Monitor CBC. Oncology consulted (noted hx of CLL, no need for tx now, observation only). IgG sent by Oncology      HyperLipidemia - controlled on home Statin. Continue, w/ f/u and med adjustment w/ PCP     DM2- SSI and lantus. Monitor BS as will be elevated due to steroids and illness. A1c 7.5 July, 2021.      CKD - baseline stage 4. Follow serial labs. Reviewed and documented as above. CHF - chronic diastolic failure w/ preserved EF 55-60% by Echo dated this admission w/ Grade 2 diastolic dysfunction.   Likely due to hypertensive heart disease. No evidence of acute decompensation. Continue current medical mgt. 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 Follow serial labs. Reviewed and documented as above. Lokelma x 1 19 October, re-ordered 20 October. Renal Osteodystrophy - continue Vit D.       DVT Prophylaxis: Eliquis    Recent Labs     10/20/21  0535 10/21/21  0535 10/22/21  0519    203 220     Diet: Diet NPO Exceptions are: Other (Specify); Specify Other Exceptions: low volume puree pleasure feeds  Code Status: Limited      PT/OT Eval Status: seen w/ recs for SNF. Dispo - Possibly to SNF Friday 22 October pending clinical course, subspecialty recs and placement decision.      Wanda House MD

## 2021-10-22 NOTE — PROGRESS NOTES
Speech Language Pathology  Facility/Department: [id number] [place] [address] PCU  Dysphagia Daily Treatment Note    Recommendations :  **If the goals of care are to preserve QOL:**  Diet Solid Recommendation: Puree  Diet Liquid Recommendation:  Thin via spoon    Recommended Form of Meds: Medications crushed in pure   STRICT aspiration precautions, 1:1 feeding as tolerated; frequent oral care     NAME: Darice Osgood  : 1935  MRN: [id number]    Patient Diagnosis(es):   Patient Active Problem List    Diagnosis Date Noted    Hyperkalemia 10/19/2021    Centrilobular emphysema ([address] 10/17/2021    Pulmonary fibrosis ([address] 10/17/2021    Lung nodules 10/17/2021    DM (diabetes mellitus), secondary uncontrolled ([address] 10/17/2021    Elevated d-dimer 10/17/2021    Elevated ferritin level 10/17/2021    Calculus of gallbladder without cholecystitis without obstruction 10/17/2021    Acute respiratory failure due to COVID-19 [facility]) 10/16/2021    Atrial fibrillation with RVR ([address] 10/16/2021    COVID-19 10/10/2021    Renal calculi 2021    Chronic kidney disease (CKD), stage IV (severe) (HCC) 2020    Chronic lymphocytic leukemia ([address] 01/15/2020    Essential tremor 2019    DM (diabetes mellitus), secondary, uncontrolled, w/neurologic complic ([address]     HTN (hypertension), benign 2019    Dyslipidemia 2019    Lymphocytosis 2019    Chronic kidney disease, stage III (moderate) ([address] 2019    Right wrist pain 2019    Abrasion of right forearm 2019    Abrasion of left forearm 2019    Type 2 diabetes mellitus with hyperglycemia ([address] 2017    Bradycardia 2014    Diabetes mellitus ([address] 10/03/2012    Murmur, cardiac 10/03/2012    Thoracic compression fracture ([address] 10/03/2012    MVP (mitral valve prolapse) 10/02/2012    Hyperlipemia 10/02/2012    BPH (benign prostatic hyperplasia) 10/02/2012    IGT (impaired glucose tolerance) 10/02/2012  Dysmetabolic syndrome X 03/91/4604    Benign essential tremor 10/02/2012    HTN (hypertension) 10/02/2012    Hematuria 10/02/2012    Renal insufficiency 10/02/2012     CXR (10/20/21)-  Impression    Diffuse interstitial opacity bilaterally is not significantly changed as   compared to prior.         Previous MBS (N/A)    Chart reviewed. Medical Diagnosis: COVID-19  Treatment Diagnosis: Dysphagia     BSE Impression (10/22/21)-  RR 24/min prior to po trials with O2 sat 87% on 40lpm, FiO2 100% Vapotherm. No overt s/s of aspiration noted with pureed food trials, soft food trials. Pt declined thin liquid trials, stating: \"They'll choke me up. \" Suspected aspiration of nectar thick liquids with immediate wet coughing post swallow. O2 desat to 84%. Suspected possible laryngeal peentration versus aspiration of honey thick liquid trials with pt displaying immediate wet throat clearing post swallow, delayed wet coughing. RR increase to 28/min and O2 sat at 89%. Peak airway pressure in patient's receiving 35 L/min or greater of O2 on HFNC can place patient at increased risk for food/liquid being blown into the airway and increase the risk of aspiration, especially in patient's with past hx of swallow dysfunction. Recommend NPO at this time. SLP to continue to reassess appropriateness for PO intake as O2 demands decrease. Consider trialing standard nasal cannula at lower flow rate for the duration of a meal (20 mins) to decrease pharyngeal/airway pressure and increase swallow safety during PO intake. MBS results-   Unable to complete MBS at this time per hospital protocol, as pt is on droplet+ precautions. He will be out of isolation 10/24. Please order MBS when appropriate. Pain:   Denies     Current Diet :   Diet Solids Recommendation: NPO  Recommended Form of Meds: Via alternative means of nutrition  Recommendations: NPO;Consider alternative nutrition; Therapeutic feeds, pureed food pleasure feeds with SLP/RN only    Treatment/Assessment:  Pt seen bedside to address the following goals:  Short-term Goals  Timeframe for Short-term Goals: 1 week, 3x/wk until 10/28/21  1) The patient will tolerate repeat BSE when able  10/22: Goal met. Pt with baseline coughing saturation 84-92% throughout the session. Primarily >86. Thorough oral care provided. He requested water and reported xerostomia. He remains on 40 L/min HFNC. He tolerated ice chips and small, single sips of thin liquids via spoon and puree with intermittent coughing ~ <25% of trials. Overall, there is limited options re: feeding plan for Mr. Aggie Yatse. He has not had significant means of nutrition for 10 days, which is very concerning. Per report, he is  not a candidate for an NGT d/t high 02 needs and per conversation with the patient he does NOTwant a PEG/nor do I believe he would be a good candidate for PEG. He is baseline coughing and is at an increased risk for silent aspiration given high 02 needs. He seemed to do okay with sips of water via spoon and applesauce today with no significant choking events or desaturation. He really wants water. It would be much worse for him to aspirate thickened liquids vs thin liquids per research. Given limitations of clinical swallow evaluation, my recommendations are based on subjective observations and geared more towards patient's QOL at this time. Would suggest initiating a diet of puree, thin liquids via spoon with 1:1 feeding, frequent oral care and strict aspiration precautions. Pt has been educated on the risks and appears to be in agreement with recommendations. Dr. Efrain García and nursing notified. MD to place orders if in agreement. 2) Pt will participate in a MBS when out of droplet + precautions (10/24)   10/22: New goal. U/b to complete MBS at this time per hospital protocol.      Long-term Goals  Timeframe for Long-term Goals: 1-2 weeks  1)Pt will tolerate ongoing assessment of swallowing at bedside and tolerate least restrictive diet. Ongoing, see above     Patient Education:  Patient educated on current evaluation and feeding limitations. Pt reports he does NOT want a PEG tube. Pt would like to trial an oral diet with full understanding of HIGH aspiration risk. Compensatory Strategies:  Frequent oral care with suctioning   1:1 feeding  Small sips via spoon   Medications crushed in puree  Alt liquids/puree  Fully upright during intake  If significant desaturation or coughing occurs, hold PO   Small meals with feeding breaks    Plan:  Continued daily Dysphagia treatment with goals per  plan of care. Diet recommendations:  DC recommendation:  Treatment: 38 mins  D/W nursing, Jonah Zee; MD Perfect[employer]erved with findings/recommendations   Needs met prior to leaving room, call button in reach. If patient is discharged prior to next treatment, this note will serve as the discharge summary    Thank you.    Hedy Lamb M.A, CCC-SLP/ CBIS

## 2021-10-22 NOTE — PROGRESS NOTES
10/21/21 3953   Oxygen Therapy/Pulse Ox   O2 Therapy Oxygen humidified   O2 Device Heated high flow cannula   O2 Flow Rate (L/min) 40 L/min   FiO2  100 %   SpO2 94 %   Humidification Source Heated wire   Humidification Temp 33

## 2021-10-22 NOTE — PROGRESS NOTES
10/21/21 2018   Oxygen Therapy/Pulse Ox   O2 Therapy Oxygen humidified   O2 Device Heated high flow cannula  (w/NRB)   O2 Flow Rate (L/min) 40 L/min   FiO2  100 %   SpO2 100 %

## 2021-10-22 NOTE — PROGRESS NOTES
[facility] Pulmonary/CCM Progress note      Admit Date: 10/10/2021    Chief Complaint: Shortness of breath    Subjective: Interval History: Still continues to require high concentrations of oxygen, 100% / 40 L. Appears awake and alert, comfortable. Dry cough.     Scheduled Meds:   insulin glargine  40 Units SubCUTAneous BID    baricitinib  1 mg Oral Daily    budesonide-formoterol  2 puff Inhalation BID    pantoprazole  40 mg Oral QAM AC    apixaban  2.5 mg Oral BID    digoxin  0.0625 mg Oral Q48H    dilTIAZem  120 mg Oral Daily    influenza virus vaccine  0.5 mL IntraMUSCular Prior to discharge    insulin lispro  0-18 Units SubCUTAneous TID WC    insulin lispro  0-9 Units SubCUTAneous Nightly    aspirin  81 mg Oral Daily    escitalopram  10 mg Oral Daily    rosuvastatin  40 mg Oral Daily    tamsulosin  0.4 mg Oral Daily    sodium chloride flush  5-40 mL IntraVENous 2 times per day    methylPREDNISolone  40 mg IntraVENous Q12H     Continuous Infusions:   sodium chloride      dextrose       PRN Meds:guaiFENesin-dextromethorphan, levalbuterol, perflutren lipid microspheres, prochlorperazine, sodium chloride flush, sodium chloride, polyethylene glycol, acetaminophen **OR** acetaminophen, glucose, dextrose, glucagon (rDNA), dextrose    Review of Systems  Constitutional: negative for fatigue, fevers, malaise and weight loss  Ears, nose, mouth, throat: negative for ear drainage, epistaxis, hoarseness, nasal congestion, sore throat and voice change  Respiratory: negative except for cough and shortness of breath  Cardiovascular: negative for chest pain, chest pressure/discomfort, irregular heart beat, lower extremity edema and palpitations  Gastrointestinal: negative for abdominal pain, constipation, diarrhea, jaundice, melena, odynophagia, reflux symptoms and vomiting  Hematologic/lymphatic: negative for bleeding, easy bruising, lymphadenopathy and petechiae  Musculoskeletal:negative for arthralgias, bone pain, muscle weakness, neck pain and stiff joints  Neurological: negative for dizziness, gait problems, headaches, seizures, speech problems, tremors and weakness  Behavioral/Psych: negative for anxiety, behavior problems, depression, fatigue and sleep disturbance  Endocrine: negative for diabetic symptoms including none, neuropathy, polyphagia, polyuria, polydipsia, vomiting and diarrhea and temperature intolerance  Allergic/Immunologic: negative for anaphylaxis, angioedema, hay fever and urticaria    Objective:     Patient Vitals for the past 8 hrs:   BP Temp Temp src Pulse Resp SpO2   10/22/21 1700 124/70 97.9 °F (36.6 °C) Axillary 103 20 93 %   10/22/21 1624      93 %   10/22/21 1147      91 %   10/22/21 1145 110/65 97.6 °F (36.4 °C) Axillary 102 22 (!) 88 %     I/O last 3 completed shifts: In: 100 [P.O.:100]  Out: 550 [Urine:550]  No intake/output data recorded.     General Appearance: alert and oriented to person, place and time, well developed and well- nourished, in no acute distress  Skin: warm and dry, no rash or erythema  Head: normocephalic and atraumatic  Eyes: pupils equal, round, and reactive to light, extraocular eye movements intact, conjunctivae normal  ENT: external ear and ear canal normal bilaterally, nose without deformity, nasal mucosa and turbinates normal  Neck: supple and non-tender without mass, no cervical lymphadenopathy  Pulmonary/Chest: rales present-bilateral  Cardiovascular: normal rate, regular rhythm,  no murmurs, rubs, distal pulses intact, no carotid bruits  Abdomen: soft, non-tender, non-distended, normal bowel sounds, no masses or organomegaly  Lymph Nodes: Cervical, supraclavicular normal  Extremities: no cyanosis, clubbing or edema  Musculoskeletal: normal range of motion, no joint swelling, deformity or tenderness  Neurologic: alert, no focal neurologic deficits    Data Review:  CBC:   Lab Results   Component Value Date    WBC 32.5 10/22/2021    RBC 4.63 10/22/2021 BMP:   Lab Results   Component Value Date    GLUCOSE 103 10/22/2021    CO2 23 10/22/2021    BUN 68 10/22/2021    CREATININE 2.1 10/22/2021    CALCIUM 8.9 10/22/2021     ABG: No results found for: HBS3GZU, BEART, L0BNAYMP, PHART, THGBART, FMT1NND, PO2ART, HBL8DLU    Radiology: All pertinent images / reports were reviewed as a part of this visit. Narrative   EXAMINATION:   ONE XRAY VIEW OF THE CHEST       10/21/2021 5:47 am       COMPARISON:   10/10/2021       HISTORY:   ORDERING SYSTEM PROVIDED HISTORY: RF/COVID   TECHNOLOGIST PROVIDED HISTORY:   Reason for exam:->RF/COVID   Reason for Exam: RF/COVID       FINDINGS:   Lateral most left hemithorax is incompletely imaged.  Diffuse interstitial   opacity bilaterally is similar to prior.  Multiple linear artifacts are seen   projecting over the right chest.  No pneumothorax.  Cardiac and mediastinal   silhouettes are similar to prior.  Cardiomegaly.  Evidence of prior vertebral   body augmentation.           Impression   Diffuse interstitial opacity bilaterally is not significantly changed as   compared to prior. Problem List:     COVID-19 pneumonia  Acute hypoxic respiratory failure    Assessment/Plan:     Requiring heated high flow nasal cannula 100% / 40 L, appears comfortable though with no significant work of breathing. Imaging consistent with multifocal infiltrates from COVID-19 pneumonia. On IV Solu-Medrol and low-dose baricitinib. D-dimer 1600, already on Eliquis. Limited code but wants trial of intubation if indicated.     Pulmonary will follow    Sid Godwin MD

## 2021-10-23 NOTE — PROGRESS NOTES
Pt ; MD notified. High sliding scale Humalog changed to low sliding scale Humalog. Writer instructed to give low sliding scale Humalog at this time.

## 2021-10-23 NOTE — PROGRESS NOTES
[facility] Pulmonary/CCM Progress note      Admit Date: 10/10/2021    Chief Complaint: Shortness of breath    Subjective: Interval History: Continues on 100% / 60 L, heated high flow + nonrebreather mask. Appears winded even at rest, but alert and oriented. Denies any phlegm with the cough.     Scheduled Meds:   insulin glargine  20 Units SubCUTAneous BID    insulin lispro  0-6 Units SubCUTAneous TID WC    insulin lispro  0-3 Units SubCUTAneous Nightly    [START ON 10/24/2021] methylPREDNISolone  40 mg IntraVENous Daily    baricitinib  1 mg Oral Daily    budesonide-formoterol  2 puff Inhalation BID    pantoprazole  40 mg Oral QAM AC    apixaban  2.5 mg Oral BID    digoxin  0.0625 mg Oral Q48H    dilTIAZem  120 mg Oral Daily    influenza virus vaccine  0.5 mL IntraMUSCular Prior to discharge    aspirin  81 mg Oral Daily    escitalopram  10 mg Oral Daily    rosuvastatin  40 mg Oral Daily    tamsulosin  0.4 mg Oral Daily    sodium chloride flush  5-40 mL IntraVENous 2 times per day     Continuous Infusions:   sodium chloride      dextrose       PRN Meds:guaiFENesin-dextromethorphan, levalbuterol, perflutren lipid microspheres, prochlorperazine, sodium chloride flush, sodium chloride, polyethylene glycol, acetaminophen **OR** acetaminophen, glucose, dextrose, glucagon (rDNA), dextrose    Review of Systems  Constitutional: negative for fatigue, fevers, malaise and weight loss  Ears, nose, mouth, throat: negative for ear drainage, epistaxis, hoarseness, nasal congestion, sore throat and voice change  Respiratory: negative except for cough and shortness of breath  Cardiovascular: negative for chest pain, chest pressure/discomfort, irregular heart beat, lower extremity edema and palpitations  Gastrointestinal: negative for abdominal pain, constipation, diarrhea, jaundice, melena, odynophagia, reflux symptoms and vomiting  Hematologic/lymphatic: negative for bleeding, easy bruising, lymphadenopathy and petechiae  Musculoskeletal:negative for arthralgias, bone pain, muscle weakness, neck pain and stiff joints  Neurological: negative for dizziness, gait problems, headaches, seizures, speech problems, tremors and weakness  Behavioral/Psych: negative for anxiety, behavior problems, depression, fatigue and sleep disturbance  Endocrine: negative for diabetic symptoms including none, neuropathy, polyphagia, polyuria, polydipsia, vomiting and diarrhea and temperature intolerance  Allergic/Immunologic: negative for anaphylaxis, angioedema, hay fever and urticaria    Objective:     Patient Vitals for the past 8 hrs:   BP Temp Temp src Pulse Resp SpO2   10/23/21 1607 131/69 97.8 °F (36.6 °C) Oral 99 20 91 %   10/23/21 1600      95 %   10/23/21 1301 121/70 98.2 °F (36.8 °C) Axillary 105 22 95 %   10/23/21 1230      93 %     I/O last 3 completed shifts:  In: -   Out: 850 [Urine:850]  No intake/output data recorded.     General Appearance: alert and oriented to person, place and time, well developed and well- nourished, in no acute distress  Skin: warm and dry, no rash or erythema  Head: normocephalic and atraumatic  Eyes: pupils equal, round, and reactive to light, extraocular eye movements intact, conjunctivae normal  ENT: external ear and ear canal normal bilaterally, nose without deformity, nasal mucosa and turbinates normal  Neck: supple and non-tender without mass, no cervical lymphadenopathy  Pulmonary/Chest: rales present-bilateral  Cardiovascular: normal rate, regular rhythm,  no murmurs, rubs, distal pulses intact, no carotid bruits  Abdomen: soft, non-tender, non-distended, normal bowel sounds, no masses or organomegaly  Lymph Nodes: Cervical, supraclavicular normal  Extremities: no cyanosis, clubbing or edema  Musculoskeletal: normal range of motion, no joint swelling, deformity or tenderness  Neurologic: alert, no focal neurologic deficits    Data Review:  CBC:   Lab Results   Component Value Date    WBC 36.3 10/23/2021    RBC 5.05 10/23/2021     BMP:   Lab Results   Component Value Date    GLUCOSE 153 10/23/2021    CO2 24 10/23/2021    BUN 68 10/23/2021    CREATININE 2.1 10/23/2021    CALCIUM 9.2 10/23/2021     ABG: No results found for: VMP2OUH, BEART, A1FVKYCD, PHART, THGBART, YJD8TMP, PO2ART, OVS1TNO    Radiology: All pertinent images / reports were reviewed as a part of this visit. Narrative   EXAMINATION:   ONE XRAY VIEW OF THE CHEST       10/21/2021 5:47 am       COMPARISON:   10/10/2021       HISTORY:   ORDERING SYSTEM PROVIDED HISTORY: RF/COVID   TECHNOLOGIST PROVIDED HISTORY:   Reason for exam:->RF/COVID   Reason for Exam: RF/COVID       FINDINGS:   Lateral most left hemithorax is incompletely imaged.  Diffuse interstitial   opacity bilaterally is similar to prior.  Multiple linear artifacts are seen   projecting over the right chest.  No pneumothorax.  Cardiac and mediastinal   silhouettes are similar to prior.  Cardiomegaly.  Evidence of prior vertebral   body augmentation.           Impression   Diffuse interstitial opacity bilaterally is not significantly changed as   compared to prior. Problem List:     COVID-19 pneumonia  Acute hypoxic respiratory failure    Assessment/Plan:     Requiring heated high flow nasal cannula 100% / 40 L, appears comfortable though with no significant work of breathing. Imaging consistent with multifocal infiltrates from COVID-19 pneumonia. On IV Solu-Medrol and low-dose baricitinib. WBC 36> trending up, ? Solu-Medrol related. Will decrease dose to 40 mg IV daily. However, will check CRP and procalcitonin. No diarrhea reported. D-dimer 1600, already on Eliquis. Limited code but wants trial of intubation if indicated.     Pulmonary will follow    Fuentes Posey MD

## 2021-10-23 NOTE — PROGRESS NOTES
Hospitalist Progress Note      PCP: No primary care provider on file. Date of Admission: 10/10/2021    Chief Complaint: Recurrent fall with shortness of breath    Hospital Course:     Subjective: Patient is lying in bed comfortable. On facemask oxygen      Medications:  Reviewed    Infusion Medications    sodium chloride      dextrose       Scheduled Medications    insulin glargine  40 Units SubCUTAneous BID    baricitinib  1 mg Oral Daily    budesonide-formoterol  2 puff Inhalation BID    pantoprazole  40 mg Oral QAM AC    apixaban  2.5 mg Oral BID    digoxin  0.0625 mg Oral Q48H    dilTIAZem  120 mg Oral Daily    influenza virus vaccine  0.5 mL IntraMUSCular Prior to discharge    insulin lispro  0-18 Units SubCUTAneous TID WC    insulin lispro  0-9 Units SubCUTAneous Nightly    aspirin  81 mg Oral Daily    escitalopram  10 mg Oral Daily    rosuvastatin  40 mg Oral Daily    tamsulosin  0.4 mg Oral Daily    sodium chloride flush  5-40 mL IntraVENous 2 times per day    methylPREDNISolone  40 mg IntraVENous Q12H     PRN Meds: guaiFENesin-dextromethorphan, levalbuterol, perflutren lipid microspheres, prochlorperazine, sodium chloride flush, sodium chloride, polyethylene glycol, acetaminophen **OR** acetaminophen, glucose, dextrose, glucagon (rDNA), dextrose      Intake/Output Summary (Last 24 hours) at 10/23/2021 0904  Last data filed at 10/23/2021 0610  Gross per 24 hour   Intake 100 ml   Output 200 ml   Net -100 ml       Physical Exam Performed:    /64   Pulse 111   Temp 97.7 °F (36.5 °C) (Axillary)   Resp 22   Ht 5' 8\" (1.727 m)   Wt 125 lb 10.6 oz (57 kg)   SpO2 97%   BMI 19.11 kg/m²     General appearance: No apparent distress, appears stated age and cooperative. HEENT: Pupils equal, round, and reactive to light. Conjunctivae/corneas clear. Neck: Supple, with full range of motion. No jugular venous distention. Trachea midline. Respiratory:  Normal respiratory effort. Clear to auscultation, bilaterally without Rales/Wheezes/Rhonchi. Cardiovascular: Regular rate and rhythm with normal S1/S2 without murmurs, rubs or gallops. Abdomen: Soft, non-tender, non-distended with normal bowel sounds. Musculoskeletal: No clubbing, cyanosis or edema bilaterally. Full range of motion without deformity. Skin: Skin color, texture, turgor normal.  No rashes or lesions. Neurologic:  Neurovascularly intact without any focal sensory/motor deficits. Cranial nerves: II-XII intact, grossly non-focal.  Psychiatric: Alert and oriented, thought content appropriate, normal insight  Capillary Refill: Brisk,3 seconds, normal   Peripheral Pulses: +2 palpable, equal bilaterally       Labs:   Recent Labs     10/21/21  0535 10/22/21  0519 10/23/21  0523   WBC 31.9* 32.5* 36.3*   HGB 12.7* 13.3* 14.3   HCT 39.2* 40.9 43.9    220 254     Recent Labs     10/21/21  0534 10/22/21  0519 10/23/21  0523   * 140 140   K 4.8 5.0 5.1    105 104   CO2 23 23 24   BUN 66* 68* 68*   CREATININE 2.2* 2.1* 2.1*   CALCIUM 8.7 8.9 9.2     Recent Labs     10/21/21  0534 10/22/21  0519 10/23/21  0523   AST 38* 41* 41*   ALT 37 42* 38   BILITOT 0.7 0.9 0.9   ALKPHOS 98 93 89     No results for input(s): INR in the last 72 hours. No results for input(s): Jasson Greenfield in the last 72 hours. Urinalysis:      Lab Results   Component Value Date    NITRU Negative 10/10/2021    WBCUA 0-2 10/10/2021    BACTERIA 2+ 01/02/2020    RBCUA None seen 10/10/2021    BLOODU LARGE 10/10/2021    SPECGRAV 1.025 10/10/2021    GLUCOSEU Negative 10/10/2021       Radiology:  XR CHEST PORTABLE   Final Result   Diffuse interstitial opacity bilaterally is not significantly changed as   compared to prior.          VL Extremity Venous Bilateral   Final Result      XR THORACIC SPINE (MIN 4 VIEWS)   Final Result   Multiple old compression fractures status post vertebral body enhancement T10   and T12      No acute fracture definitely identified         XR CHEST PORTABLE   Final Result   No acute cardiopulmonary disease      Stable interstitial opacities, likely fibrotic. Assessment/Plan:    Active Hospital Problems    Diagnosis     Hyperkalemia [E87.5]     Centrilobular emphysema ([facility] [J43.2]     Pulmonary fibrosis ([facility] [J84.10]     Lung nodules [R91.8]     DM (diabetes mellitus), secondary uncontrolled ([facility] [E13.65]     Calculus of gallbladder without cholecystitis without obstruction [K80.20]     Atrial fibrillation with RVR (HCC) [I48.91]     COVID-19 [U07.1]     Chronic kidney disease (CKD), stage IV (severe) (HCC) [N18.4]     [facility] [C91.10]      1. COVID-19 positive on 10/10/2021 with the symptoms onset on 10/4/2021 admitted with acute respiratory failure with hypoxia, COVID-19 pneumonia infectious disease consulted status post Baricitinib started on 10/19/2021 per infectious disease plan for isolation through 10/24/2021. Continue with the Solu-Medrol IV, supplemental oxygen pulmonary consulted and following. 2.  Acute respiratory failure with hypoxia secondary to above continue with the supplemental oxygen pulmonary consult appreciated consulted and following. 3.  Diabetes mellitus type 2 as patient is n.p.o. decrease the basal insulin to 20 units Lantus twice daily. Check hemoglobin A1c.  4.  Atrial fibrillation new onset with RVR cardiology consulted initially started on Cardizem drip changed to p.o. Cardizem and Eliquis echo on 10/21/2021 with a preserved EF. 5.  CLL with a lymphocytosis chronically elevated leukocytosis oncology consulted. IgG sent by oncology. 6.  Hyperlipidemia continue with the statin. 7.  Chronic kidney disease stage IV stable. 8.  Renal osteodystrophy continue with vitamin D supplement. 9.  CHF chronic diastolic with preserved EF of 55 to 60% by echo no evidence of acute decompensation.       DVT Prophylaxis: Eliquis  Diet: Diet NPO Exceptions are: Other (Specify); Specify Other Exceptions: low volume puree pleasure feeds  Code Status: Limited    PT/OT Eval Status: Seen and recommended SNF    Dispo -still on high flow oxygen.     Heath Garcia MD

## 2021-10-24 NOTE — PROGRESS NOTES
for arthralgias, bone pain, muscle weakness, neck pain and stiff joints  Neurological: negative for dizziness, gait problems, headaches, seizures, speech problems, tremors and weakness  Behavioral/Psych: negative for anxiety, behavior problems, depression, fatigue and sleep disturbance  Endocrine: negative for diabetic symptoms including none, neuropathy, polyphagia, polyuria, polydipsia, vomiting and diarrhea and temperature intolerance  Allergic/Immunologic: negative for anaphylaxis, angioedema, hay fever and urticaria    Objective:     Patient Vitals for the past 8 hrs:   BP Temp Temp src Pulse Resp SpO2   10/24/21 1213      93 %   10/24/21 1200 130/61 97.6 °F (36.4 °C) Axillary 80 20 93 %   10/24/21 0915  96.9 °F (36.1 °C) Axillary  20    10/24/21 0908      92 %     I/O last 3 completed shifts:   In: 5 [P.O.:720]  Out: 1150 [Urine:1150]  I/O this shift:  In: -   Out: 800 [Urine:800]    General Appearance: alert and oriented to person, place and time, well developed and well- nourished, in no acute distress  Skin: warm and dry, no rash or erythema  Head: normocephalic and atraumatic  Eyes: pupils equal, round, and reactive to light, extraocular eye movements intact, conjunctivae normal  ENT: external ear and ear canal normal bilaterally, nose without deformity, nasal mucosa and turbinates normal  Neck: supple and non-tender without mass, no cervical lymphadenopathy  Pulmonary/Chest: rales present-bilateral  Cardiovascular: normal rate, regular rhythm,  no murmurs, rubs, distal pulses intact, no carotid bruits  Abdomen: soft, non-tender, non-distended, normal bowel sounds, no masses or organomegaly  Lymph Nodes: Cervical, supraclavicular normal  Extremities: no cyanosis, clubbing or edema  Musculoskeletal: normal range of motion, no joint swelling, deformity or tenderness  Neurologic: alert, no focal neurologic deficits    Data Review:  CBC:   Lab Results   Component Value Date    WBC 22.3 10/24/2021 RBC 4.62 10/24/2021     BMP:   Lab Results   Component Value Date    GLUCOSE 210 10/24/2021    CO2 23 10/24/2021    BUN 83 10/24/2021    CREATININE 2.6 10/24/2021    CALCIUM 8.9 10/24/2021     ABG: No results found for: LFG1TPN, BEART, L0PGOSJN, PHART, THGBART, BVR0RCT, PO2ART, OBY2UFD    Radiology: All pertinent images / reports were reviewed as a part of this visit. Narrative   EXAMINATION:   ONE XRAY VIEW OF THE CHEST       10/21/2021 5:47 am       COMPARISON:   10/10/2021       HISTORY:   ORDERING SYSTEM PROVIDED HISTORY: RF/COVID   TECHNOLOGIST PROVIDED HISTORY:   Reason for exam:->RF/COVID   Reason for Exam: RF/COVID       FINDINGS:   Lateral most left hemithorax is incompletely imaged.  Diffuse interstitial   opacity bilaterally is similar to prior.  Multiple linear artifacts are seen   projecting over the right chest.  No pneumothorax.  Cardiac and mediastinal   silhouettes are similar to prior.  Cardiomegaly.  Evidence of prior vertebral   body augmentation.           Impression   Diffuse interstitial opacity bilaterally is not significantly changed as   compared to prior. Problem List:     COVID-19 pneumonia  Acute hypoxic respiratory failure    Assessment/Plan:     Requiring heated high flow nasal cannula 100% / 40 L, appears comfortable though with no significant work of breathing. Imaging consistent with multifocal infiltrates from COVID-19 pneumonia. Procalcitonin elevated at 1, add Levaquin for empiric antibiotic therapy. CRP 18. On IV Solu-Medrol and low-dose baricitinib. Elevated WBC related to history of CLL. No diarrhea reported. Worsening renal function noted with creatinine of 2.6. D-dimer 1600, already on Eliquis. Limited code but wants trial of intubation if indicated. Overall prognosis is guarded.     Pulmonary will follow    Travon Hodges MD

## 2021-10-24 NOTE — CONSULTS
Thank you to requesting provider: Dr. Jorge Alberto Hurtado , for asking us to see Fay Coffey  Reason for consultation:   Acute Kidney Injury   Chief Complaint:  Shortness of breath    History of Presenting Illness      80 y/o with history of CKD stage IV who follows with Dr. Jose Eduardo Boyer admitted on 10/10/2021 with COVID 19 pneumonia and hypoxia. He did get both vaccinate doses but remains hypoxemic and has a longed hospital stay. We have been asked to see him for acute kidney injury. Scr normally 2.2 and now up to 2.6. Associated with mild hyperkalemia. UA initially bland.         Past Medical/Surgical History      Active Ambulatory Problems     Diagnosis Date Noted    MVP (mitral valve prolapse) 10/02/2012    Hyperlipemia 10/02/2012    BPH (benign prostatic hyperplasia) 10/02/2012    IGT (impaired glucose tolerance) 52/61/9121    Dysmetabolic syndrome X 09/93/0671    Benign essential tremor 10/02/2012    HTN (hypertension) 10/02/2012    Hematuria 10/02/2012    Renal insufficiency 10/02/2012    Diabetes mellitus ([address] 10/03/2012    Murmur, cardiac 10/03/2012    Thoracic compression fracture (HCC) 10/03/2012    Bradycardia 07/08/2014    Type 2 diabetes mellitus with hyperglycemia ([address] 12/21/2017    Right wrist pain 06/11/2019    Abrasion of right forearm 06/11/2019    Abrasion of left forearm 06/11/2019    Lymphocytosis 07/01/2019    Chronic kidney disease, stage III (moderate) (HCC) 07/01/2019    Essential tremor 11/14/2019    DM (diabetes mellitus), secondary, uncontrolled, w/neurologic complic ([address] 66/70/5892    HTN (hypertension), benign 11/14/2019    Dyslipidemia 11/14/2019    Chronic kidney disease (CKD), stage IV (severe) ([address] 09/22/2020    Renal calculi 06/01/2021    Chronic lymphocytic leukemia ([address] 01/15/2020    Acute respiratory failure due to COVID-19 ([address] 10/16/2021    Elevated d-dimer 10/17/2021    Elevated ferritin level 10/17/2021     Resolved Ambulatory Problems Diagnosis Date Noted    No Resolved Ambulatory Problems     Past Medical History:   Diagnosis Date    Back pain     Cancer ([facility]     Centrilobular emphysema ([facility] 10/17/2021    Chronic kidney disease     COVID-19 10/10/2021    History of BPH     Hypercholesteremia          Review of Systems     Constitutional:  No weight loss, no fever/chills  Eyes:  No eye pain, no eye redness  Cardiovascular:  No chest pain, no worsening of edema  Respiratory:  No hemoptysis, no stridor, + SOB  Gastrointestinal:  No blood in stool, no n/v, no diarrhea  Genitoruinary:  No hematuria, no difficulty with urination  Musculoskeletal:  No joint swelling, no redness  Integumentary:  No Rash, no itching  Neurological:  No focal weakness, No new sensory deficit  Psychiatric:  No depression, no confusion  Endocrine:  No polyuria, no polydipsia       Medications      Reviewed in EMR     Allergies     Patient has no known allergies. Family History       Negative for Kidney Disease    Social History      Social History     Socioeconomic History    Marital status:       Spouse name: None    Number of children: None    Years of education: None    Highest education level: None   Occupational History    None   Tobacco Use    Smoking status: Former Smoker     Packs/day: 1.00     Years: 10.00     Pack years: 10.00     Types: Cigarettes     Quit date: 3/19/1978     Years since quittin.6    Smokeless tobacco: Never Used    Tobacco comment: quit 25 years ago   Vaping Use    Vaping Use: Never used   Substance and Sexual Activity    Alcohol use: Yes     Comment: on occasion    Drug use: No    Sexual activity: None   Other Topics Concern    None   Social History Narrative    None     Social Determinants of Health     Financial Resource Strain: Low Risk     Difficulty of Paying Living Expenses: Not hard at all   Food Insecurity: No Food Insecurity    Worried About Running Out of Food in the Last Year: Never true   [address] [place] [address] Ran Out of Food in the Last Year: Never true   Transportation Needs:     Lack of Transportation (Medical):  Lack of Transportation (Non-Medical):    Physical Activity:     Days of Exercise per Week:     Minutes of Exercise per Session:    Stress:     Feeling of Stress :    Social Connections:     Frequency of Communication with Friends and Family:     Frequency of Social Gatherings with Friends and Family:     Attends Confucianist Services:     Active Member of Clubs or Organizations:     Attends Club or Organization Meetings:     Marital Status:    Intimate Partner Violence:     Fear of Current or Ex-Partner:     Emotionally Abused:     Physically Abused:     Sexually Abused:        Physical Exam     Blood pressure 130/61, pulse 80, temperature 97.6 °F (36.4 °C), temperature source Axillary, resp. rate 20, height 5' 8\" (1.727 m), weight 121 lb 7.6 oz (55.1 kg), SpO2 93 %.     General:  NAD, A+Ox3, ill-appearing  HEENT:  PERRL, EOMI  Neck:  Supple, normal range of movement  Chest:  No distress, on supplemental oxygen, no wheezing   CV:  Regular, no rub   Abdomen:  NTND, soft, +BS, no hepatosplenomegaly  Extremities:  No peripheral edema  Neurological:  Moving all four extremities, CN II-XII grossly intact  Lymphatics:  No palpable lymph nodes  Skin:  No rash, no jaundice  Psychiatric:  Normal insight and judgement, good recall    Data     Recent Labs     10/22/21  0519 10/23/21  0523 10/24/21  0619   WBC 32.5* 36.3* 22.3*   HGB 13.3* 14.3 13.2*   HCT 40.9 43.9 40.7   MCV 88.2 87.0 88.2    254 177     Recent Labs     10/22/21  0519 10/23/21  0523 10/24/21  0619    140 139   K 5.0 5.1 5.2*    104 103   CO2 23 24 23   GLUCOSE 103* 153* 210*   BUN 68* 68* 83*   CREATININE 2.1* 2.1* 2.6*   LABGLOM 30* 30* 24*   GFRAA 37* 37* 29*       Assessment:    Acute Kidney Injury:  KDIGO stage 1  - Ddx:  ATN vs. Pre-renal     Chronic Kidney Disease:  Stage IV  - Etiology:  Atrophic kidney with hypertension/diabetes and obstructive nephropathy   - Follow with Dr. Yane Padilla in the office and baseline Scr is 2.2     COVID19 Pneumonia:  Imaging with multifocal infiltrates.   On Steroids and Baricitinib     Plan:    Lokelma   Gentle IV fluids with close monitoring of respiratory status  UA with urine indices   CK  Follow labs  Expand work up as indicated pending response     Thank you for asking us to participate in the management of your patient, please do not hesitate to contact me for any concerns regarding my recommendations as outlined above.    -----------------------------  Angel Bautista M.D.   [facility]

## 2021-10-24 NOTE — PLAN OF CARE
Problem: Falls - Risk of:  Goal: Will remain free from falls  Description: Will remain free from falls  Outcome: Met This Shift  Goal: Absence of physical injury  Description: Absence of physical injury  Outcome: Met This Shift     Problem: Airway Clearance - Ineffective  Goal: Achieve or maintain patent airway  Outcome: Ongoing     Problem: Gas Exchange - Impaired  Goal: Absence of hypoxia  Outcome: Ongoing  Goal: Promote optimal lung function  Outcome: Ongoing

## 2021-10-24 NOTE — PROGRESS NOTES
Hospitalist Progress Note      PCP: No primary care provider on file. Date of Admission: 10/10/2021    Chief Complaint: Recurrent fall and shortness of breath    Hospital Course:     Subjective: Patient is lying in the bed on nasal high flow oxygen, would like to drink ice water. Medications:  Reviewed    Infusion Medications    sodium chloride      dextrose       Scheduled Medications    melatonin  5 mg Oral Nightly    insulin glargine  20 Units SubCUTAneous BID    insulin lispro  0-6 Units SubCUTAneous TID WC    insulin lispro  0-3 Units SubCUTAneous Nightly    methylPREDNISolone  40 mg IntraVENous Daily    baricitinib  1 mg Oral Daily    budesonide-formoterol  2 puff Inhalation BID    pantoprazole  40 mg Oral QAM AC    apixaban  2.5 mg Oral BID    digoxin  0.0625 mg Oral Q48H    dilTIAZem  120 mg Oral Daily    influenza virus vaccine  0.5 mL IntraMUSCular Prior to discharge    aspirin  81 mg Oral Daily    escitalopram  10 mg Oral Daily    rosuvastatin  40 mg Oral Daily    tamsulosin  0.4 mg Oral Daily    sodium chloride flush  5-40 mL IntraVENous 2 times per day     PRN Meds: guaiFENesin-dextromethorphan, levalbuterol, perflutren lipid microspheres, prochlorperazine, sodium chloride flush, sodium chloride, polyethylene glycol, acetaminophen **OR** acetaminophen, glucose, dextrose, glucagon (rDNA), dextrose      Intake/Output Summary (Last 24 hours) at 10/24/2021 0955  Last data filed at 10/24/2021 0445  Gross per 24 hour   Intake 720 ml   Output 1150 ml   Net -430 ml       Physical Exam Performed:    BP (!) 110/55   Pulse 82   Temp 96.9 °F (36.1 °C) (Axillary)   Resp 20   Ht 5' 8\" (1.727 m)   Wt 121 lb 7.6 oz (55.1 kg)   SpO2 92%   BMI 18.47 kg/m²     General appearance: No apparent distress, appears stated age and cooperative. HEENT: Pupils equal, round, and reactive to light. Conjunctivae/corneas clear. Neck: Supple, with full range of motion.  No jugular venous distention. Trachea midline. Respiratory:  Normal respiratory effort. Clear to auscultation, bilaterally without Rales/Wheezes/Rhonchi. Cardiovascular: Regular rate and rhythm with normal S1/S2 without murmurs, rubs or gallops. Abdomen: Soft, non-tender, non-distended with normal bowel sounds. Musculoskeletal: No clubbing, cyanosis or edema bilaterally. Full range of motion without deformity. Skin: Skin color, texture, turgor normal.  No rashes or lesions. Neurologic:  Neurovascularly intact without any focal sensory/motor deficits. Cranial nerves: II-XII intact, grossly non-focal.  Psychiatric: Alert and oriented, thought content appropriate, normal insight  Capillary Refill: Brisk,3 seconds, normal   Peripheral Pulses: +2 palpable, equal bilaterally       Labs:   Recent Labs     10/22/21  0519 10/23/21  0523 10/24/21  0619   WBC 32.5* 36.3* 22.3*   HGB 13.3* 14.3 13.2*   HCT 40.9 43.9 40.7    254 177     Recent Labs     10/22/21  0519 10/23/21  0523 10/24/21  0619    140 139   K 5.0 5.1 5.2*    104 103   CO2 23 24 23   BUN 68* 68* 83*   CREATININE 2.1* 2.1* 2.6*   CALCIUM 8.9 9.2 8.9     Recent Labs     10/22/21  0519 10/23/21  0523 10/24/21  0619   AST 41* 41* 33   ALT 42* 38 34   BILITOT 0.9 0.9 0.7   ALKPHOS 93 89 82     No results for input(s): INR in the last 72 hours. No results for input(s): Luigi Gey in the last 72 hours. Urinalysis:      Lab Results   Component Value Date    NITRU Negative 10/10/2021    WBCUA 0-2 10/10/2021    BACTERIA 2+ 01/02/2020    RBCUA None seen 10/10/2021    BLOODU LARGE 10/10/2021    SPECGRAV 1.025 10/10/2021    GLUCOSEU Negative 10/10/2021       Radiology:  XR CHEST PORTABLE   Final Result   Diffuse interstitial opacity bilaterally is not significantly changed as   compared to prior.          VL Extremity Venous Bilateral   Final Result      XR THORACIC SPINE (MIN 4 VIEWS)   Final Result   Multiple old compression fractures status post vertebral body enhancement T10   and T12      No acute fracture definitely identified         XR CHEST PORTABLE   Final Result   No acute cardiopulmonary disease      Stable interstitial opacities, likely fibrotic. Assessment/Plan:    Active Hospital Problems    Diagnosis     Hyperkalemia [E87.5]     Centrilobular emphysema ([facility] [J43.2]     Pulmonary fibrosis ([facility] [J84.10]     Lung nodules [R91.8]     DM (diabetes mellitus), secondary uncontrolled ([facility] [E13.65]     Calculus of gallbladder without cholecystitis without obstruction [K80.20]     Atrial fibrillation with RVR (HCC) [I48.91]     COVID-19 [U07.1]     Chronic kidney disease (CKD), stage IV (severe) (HCC) [N18.4]     Chronic lymphocytic leukemia ([facility] [C91.10]      1. COVID-19 positive on 10/10/2021 with the symptoms onset on 10/4/2021 admitted with acute respiratory failure with hypoxia, COVID-19 pneumonia infectious disease consulted status post Baricitinib started on 10/19/2021 per infectious disease plan for isolation through 10/24/2021. Continue with the Solu-Medrol IV, supplemental oxygen pulmonary consulted and following started on Zosyn for empiric antibiotic therapy. 2.  Acute respiratory failure with hypoxia secondary to above continue with the supplemental oxygen pulmonary consult appreciated consulted and following. 3.  Diabetes mellitus type 2 as patient with a poor p.o intake. decrease the basal insulin to 20 units Lantus twice daily. hemoglobin A1c= 8.4 on 10/23/2021  4. Atrial fibrillation new onset with RVR cardiology consulted initially started on Cardizem drip changed to p.o. Cardizem and Eliquis echo on 10/21/2021 with a preserved EF. 5.  CLL with lymphocytosis chronically elevated leukocytosis oncology consulted. IgG sent by oncology. 6.  Hyperlipidemia continue with the statin. 7.   Acute kidney injury on chronic kidney disease stage IV nephrology consulted, gentle IV hydration.   8.  Renal osteodystrophy continue with vitamin D supplement. 9.  CHF chronic diastolic with preserved EF of 55 to 60% by echo no evidence of acute decompensation. 10.  Nutrition supplement.       DVT Prophylaxis: Eliquis  Diet: Diet NPO Exceptions are: Other (Specify);  Specify Other Exceptions: low volume puree pleasure feeds  Code Status: Limited    PT/OT Eval Status: Seen and evaluated recommended SNF    Dispo -still on high flow oxygen    Alexandra Kapoor MD

## 2021-10-25 NOTE — PROGRESS NOTES
Occupational Therapy  Facility/Department: [facility] [address] C4 PCU  Daily Treatment Note  NAME: Joshua Tang  : 1935  MRN: [id number]    Date of Service: 10/25/2021    Discharge Recommendations:  LTACH       Assessment   Performance deficits / Impairments: Decreased functional mobility ; Decreased balance;Decreased ADL status; Decreased endurance;Decreased strength;Decreased safe awareness  Assessment: Pt PLOF I, pt very limited by endurance and O2 sats dropping with minimal activity on increased O2 demand this date, 15L of HF +15 NRB. Pt lives alone, functioning below his baseline with the above noted occupational performance deficits and would benefit from continued skilled OT in [place] [address] setting due to high O 2 demands this time. OT Education: OT Role;Precautions;Plan of Care  Patient Education: disease specific: importance of use RED/nurse call light for Assist with ADL needs & transfers, pt able to return/demonstrate  Barriers to Learning: none perceived at this time  REQUIRES OT FOLLOW UP: Yes  Activity Tolerance  Activity Tolerance: Treatment limited secondary to medical complications (free text)  Activity Tolerance: 93 % O 2 sats on 15 L O 2 non-rebreather in semi-pro's; 73 % O 2 sats on 15 L O 2 non-rebreather sitting EOB ~ 1-2 minutes, requiring 5 minutes to rebound to 90 %  Safety Devices  Safety Devices in place: Yes  Type of devices: Call light within reach; Left in bed;Bed alarm in place;Nurse notified         Patient Diagnosis(es): The primary encounter diagnosis was Generalized weakness. Diagnoses of COVID-19, LEONOR (acute kidney injury) ([address] Hyponatremia, Leukocytosis, unspecified type, Elevated LFTs, Thrombocytopenia ([address] Hypoxia, and Elevated d-dimer were also pertinent to this visit.       has a past medical history of Back pain, Cancer ([address] Centrilobular emphysema ([address] Chronic kidney disease, COVID-19, Diabetes mellitus ([address] Essential tremor, History of BPH, Hypercholesteremia, Renal insufficiency, and Type 2 diabetes mellitus with hyperglycemia ([facility] has a past surgical history that includes Cataract removal; Cystocopy; Kyphosis surgery (8/24/2012); other surgical history (Left, 06/01/2021); Kidney stone removal (Left, 6/1/2021); Nephrostomy (Left, 6/1/2021); Nephrostomy (Left, 6/1/2021); and hernia repair (8571). Restrictions  Restrictions/Precautions  Restrictions/Precautions: General Precautions, Isolation, Fall Risk  Position Activity Restriction  Other position/activity restrictions: Droplet plus; COVID +; 15 L on non-rebreather, purewick,  telemetry with continuous pulse oximeter  Subjective   General  Chart Reviewed: Yes  Patient assessed for rehabilitation services?: Yes  Response to previous treatment: Patient with no complaints from previous session  Family / Caregiver Present: No  Referring Practitioner: Dr. Mima Berger  Diagnosis: CoVID, multiple falls  Subjective  Subjective: Pt resting in bed. Agreeable to OT.  Pt on 15L of HF + 15 NRB (92-95%) at rest  General Comment  Comments: RN cleared pt for OT eval; pt awake in bed, agreeable to OT  Vital Signs  Pulse: 106  Heart Rate Source: Monitor  BP: (!) 108/57  BP Location: Right upper arm  MAP (mmHg): 74  Patient Position: Semi fowlers  Patient Currently in Pain: Denies  Oxygen Therapy  SpO2: 93 %  Pulse Oximeter Device Mode: Continuous (per CMU)  Pulse Oximeter Device Location: Right   Orientation  Orientation  Overall Orientation Status: Within Functional Limits  Objective    ADL  Feeding: NPO  Toileting: Dependent/Total (purewick)        Balance  Sitting Balance: Maximum assistance (sitting EOB 2 minutes, significant posterior lean)  Standing Balance: Unable to assess(comment) (due to absent sitting balance, decreased O 2 sats with minimal exertion)  Bed mobility  Supine to Sit: Maximum assistance  Sit to Supine: Maximum assistance  Scooting: Dependent/Total           Cognition  Overall Cognitive Status:

## 2021-10-25 NOTE — PROGRESS NOTES
Department of Internal Medicine  Nephrology Progress Note        SUBJECTIVE:    We are following this patient for LEONOR. The patient was seen and examined; he feels well today with no CP, SOB, nausea or vomiting. ROS: No fever or chills. Social: No family at bedside. Physical Exam:    VITALS:  BP (!) 100/58   Pulse 112   Temp 98.2 °F (36.8 °C) (Oral)   Resp 21   Ht 5' 8\" (1.727 m)   Wt 120 lb 5.9 oz (54.6 kg)   SpO2 93%   BMI 18.30 kg/m²     General appearance: Seems comfortable, no acute distress. Neck: Trachea midline, thyroid normal.   Lungs:  Non labored breathing, CTA to anterior auscultation. Heart:  S1S2 normal, rub or gallop. No peripheral edema. Abdomen: Soft, non-tender, no organomegaly. Skin: No lesions or rashes, warm to touch. DATA:    CBC:   Lab Results   Component Value Date    WBC 30.7 10/25/2021    RBC 4.86 10/25/2021    HGB 14.0 10/25/2021    HCT 42.0 10/25/2021    MCV 86.4 10/25/2021    MCH 28.7 10/25/2021    MCHC 33.2 10/25/2021    RDW 16.7 10/25/2021     10/25/2021    MPV 8.7 10/25/2021     BMP:    Lab Results   Component Value Date     10/25/2021    K 4.9 10/25/2021    K 4.9 10/25/2021    CL 99 10/25/2021    CO2 23 10/25/2021    BUN 85 10/25/2021    LABALBU 3.1 10/25/2021    CREATININE 2.7 10/25/2021    CALCIUM 8.6 10/25/2021    GFRAA 27 10/25/2021    GFRAA 59 03/12/2013    LABGLOM 23 10/25/2021    GLUCOSE 126 10/25/2021       Assessment:     Acute Kidney Injury:  KDIGO stage 1  - Ddx:  Likely ATN       Chronic Kidney Disease:  Stage IV  - Etiology:  Atrophic kidney with hypertension/diabetes and obstructive nephropathy   - Follow with Dr. Carl Vitale in the office and baseline Scr is 2.2      COVID19 Pneumonia:  Imaging with multifocal infiltrates.   On Steroids and Baricitinib      Plan:     Continue gentle IV fluids  Start Midodrine and apply parameters   Follow serial labs

## 2021-10-25 NOTE — PROGRESS NOTES
Care assumed from previous RN. A&O X4 this evening. VSS, NSR on tele, assessment complete as documented. Bicarb gtt infusing at 75 mL/h into right forearm IV. Vapotherm maxed at 40L with FiO2 100%, lungs diminished but no adventitious breath sounds noted, continuous oximetry remains in place. Bed alarm and Avasys activated to maintain safety. Repositioned onto side with heels floated to prevent skin breakdown. Denies pain or other needs at this time.

## 2021-10-25 NOTE — PROGRESS NOTES
Comprehensive Nutrition Assessment    Type and Reason for Visit:  Reassess, Consult (nutrition supplements)    Nutrition Recommendations/Plan:   1. Comfort measures, nutrition as tolerated    Nutrition Assessment:  Follow up:  Currently on vapotherm and non rebreather. Diet downgraded to NPO with pleasure feedings. Not tolerating pleasure feedings over the weekend per nurse at [facility] today. Palliative care and Hospice consulted. Malnutrition Assessment:  Malnutrition Status: At risk for malnutrition (Comment)    Context:  Chronic Illness       Estimated Daily Nutrient Needs:  Energy (kcal):  8902-2616 kcals/day; Weight Used for Energy Requirements:  Ideal (70 kg)     Protein (g):  70-98 g/day; Weight Used for Protein Requirements:  Ideal (1-1.4 g/kg)        Fluid (ml/day):   ; Method Used for Fluid Requirements:  1 ml/kcal      Nutrition Related Findings:  hypoactive bowel sounds; last BM on 10/16/21      Wounds:  None (Skin tears and redness)       Current Nutrition Therapies:    Diet NPO Exceptions are: Other (Specify);  Specify Other Exceptions: low volume puree pleasure feeds  ADULT ORAL NUTRITION SUPPLEMENT; Breakfast, Lunch, Dinner; Frozen Oral Supplement  ADULT ORAL NUTRITION SUPPLEMENT; Breakfast, Lunch, Dinner; Frozen Oral Supplement    Anthropometric Measures:  · Height: 5' 8\" (172.7 cm)  · Current Body Weight: 120 lb 5 oz (54.6 kg)   · Ideal Body Weight: 154 lbs; % Ideal Body Weight 90.9 %   · BMI: 18.3  · Adjusted Body Weight:  ; No Adjustment   · BMI Categories: Underweight (BMI less than 22) age over 72       Nutrition Diagnosis:   · Inadequate energy intake related to impaired respiratory function as evidenced by weight loss, intake 0-25%    Nutrition Interventions:   Food and/or Nutrient Delivery:  Continue Current Diet, Modify Oral Nutrition Supplement  Nutrition Education/Counseling:  No recommendation at this time   Coordination of Nutrition Care:  Continue to monitor while inpatient    Goals:  Comfort measures, nutrition as desired by patient       Nutrition Monitoring and Evaluation:   Behavioral-Environmental Outcomes:  None Identified   Food/Nutrient Intake Outcomes:  Food and Nutrient Intake, Supplement Intake  Physical Signs/Symptoms Outcomes:  Nutrition Focused Physical Findings, Weight, Biochemical Data     Discharge Planning:    Continue Oral Nutrition Supplement, Continue current diet     Electronically signed by Kirill Fuentes RD, LD on 10/25/21 at 3:49 PM EDT    Contact: [phone]

## 2021-10-25 NOTE — PROGRESS NOTES
10/25/21 1141   Oxygen Therapy/Pulse Ox   O2 Therapy Oxygen humidified   O2 Device Heated high flow cannula  (PLUDS NRB MASK AT 15LPM)   O2 Flow Rate (L/min) 40 L/min   FiO2  100 %   SpO2 92 %   Humidification Temp 34

## 2021-10-25 NOTE — PROGRESS NOTES
Infectious Disease Follow up Notes    CC :  Severe COVID in immunocompromised host      Antibiotics:   Zosyn 3.375 q8, s 10/24/21    Solumedrol 40 q12, s 10/10/21  baricitinib s 10/19/21  apixiban 2.5 BID   ASA 81    Admit Date:   10/10/2021  Hospital Day: 16    Subjective:   He remains afebrile  Remains on vapotherm 100/40 + NRB  \"I think I am going to die. \"  The remainder of his words were difficult to discern through the noise and masks. Apparently he discussed possible intubation earlier with Hospitalist   His son is going to visit later today     Objective:     Patient Vitals for the past 8 hrs:   BP Temp Temp src Pulse Resp SpO2 Weight   10/25/21 0817 (!) 108/57   106  93 %    10/25/21 0803      (!) 88 %    10/25/21 0503 135/63 97.9 °F (36.6 °C) Oral 99 22 (!) 89 %    10/25/21 0415       120 lb 5.9 oz (54.6 kg)   10/25/21 0334      91 %        EXAM:  General:  Alert, very weak and frail.   NAD   LUNGS:   Non-labored breathing     SKIN: No focal rash           LINE: IV site ok         Scheduled Meds:   melatonin  5 mg Oral Nightly    piperacillin-tazobactam  3,375 mg IntraVENous Q12H    sodium zirconium cyclosilicate  10 g Oral TID    insulin glargine  20 Units SubCUTAneous BID    insulin lispro  0-6 Units SubCUTAneous TID WC    insulin lispro  0-3 Units SubCUTAneous Nightly    methylPREDNISolone  40 mg IntraVENous Daily    baricitinib  1 mg Oral Daily    budesonide-formoterol  2 puff Inhalation BID    pantoprazole  40 mg Oral QAM AC    apixaban  2.5 mg Oral BID    digoxin  0.0625 mg Oral Q48H    dilTIAZem  120 mg Oral Daily    influenza virus vaccine  0.5 mL IntraMUSCular Prior to discharge    aspirin  81 mg Oral Daily    escitalopram  10 mg Oral Daily    rosuvastatin  40 mg Oral Daily    tamsulosin  0.4 mg Oral Daily    sodium chloride flush  5-40 mL IntraVENous 2 times per day Continuous Infusions:   IV infusion builder 75 mL/hr at 10/24/21 1843    sodium chloride      dextrose            Data Review:    Lab Results   Component Value Date    WBC 30.7 (H) 10/25/2021    HGB 14.0 10/25/2021    HCT 42.0 10/25/2021    MCV 86.4 10/25/2021     10/25/2021     Lab Results   Component Value Date    CREATININE 2.7 (H) 10/25/2021    BUN 85 (HH) 10/25/2021     10/25/2021    K 4.9 10/25/2021    K 4.9 10/25/2021    CL 99 10/25/2021    CO2 23 10/25/2021       Hepatic Function Panel:   Lab Results   Component Value Date    ALKPHOS 89 10/25/2021    ALT 36 10/25/2021    AST 46 10/25/2021    PROT 7.0 10/25/2021    PROT 6.6 09/05/2012    BILITOT 0.9 10/25/2021    BILIDIR <0.2 07/05/2017    IBILI see below 07/05/2017    LABALBU 3.1 10/25/2021         MICRO:  10/10 COVID NAAT+      IMAGING:  CXR 10/21/21  Impression   Diffuse interstitial opacity bilaterally is not significantly changed as   compared to prior.        Assessment:     Patient Active Problem List    Diagnosis Date Noted    Hyperkalemia 10/19/2021    Centrilobular emphysema ([address] 10/17/2021    Pulmonary fibrosis ([address] 10/17/2021    Lung nodules 10/17/2021    DM (diabetes mellitus), secondary uncontrolled ([address] 10/17/2021    Elevated d-dimer 10/17/2021    Elevated ferritin level 10/17/2021    Calculus of gallbladder without cholecystitis without obstruction 10/17/2021    Acute respiratory failure due to COVID-19 [facility]) 10/16/2021    Atrial fibrillation with RVR ([address] 10/16/2021    COVID-19 10/10/2021    Renal calculi 06/01/2021    Chronic kidney disease (CKD), stage IV (severe) ([address] 09/22/2020    Chronic lymphocytic leukemia ([address] 01/15/2020    Essential tremor 11/14/2019    DM (diabetes mellitus), secondary, uncontrolled, w/neurologic complic ([email or web address] 82/83/1479    HTN (hypertension), benign 11/14/2019    Dyslipidemia 11/14/2019    Lymphocytosis 07/01/2019    Chronic kidney disease, stage III (moderate) ([email or web address]

## 2021-10-25 NOTE — PROGRESS NOTES
10/25/21 1557   Oxygen Therapy/Pulse Ox   O2 Therapy Oxygen humidified   O2 Device Heated high flow cannula  (and NRB at 15 lpm)   O2 Flow Rate (L/min) 40 L/min   FiO2  100 %   SpO2 93 %   Humidification Source Heated wire   Humidification Temp 34   Humidification Temp Measured 34

## 2021-10-25 NOTE — PROGRESS NOTES
Progressive Care Progress Note    Patient: Alexa Kumar MRN: [id number]  Date of  Admission: 10/10/2021   YOB: 1935  Age: 81 y.o. Sex: male    Unit: [facility] C4 U  Room/Bed: 043/0430-01 Attending Physician: Aleida Madden MD   Admitting Physician: Lashell Jay          Chief Complaint/Referring Provider:  Patient is being seen at the request of Dr. Lillian Banegas for a consultation for COVID 19 pneumonia requiring 10 L oxygen now      Presenting HPI: Patient admitted to the hospital with increasing shortness of breath    Subjective: Still requiring high amounts of oxygen  No chest pains  No hemoptysis      Somewhat anxious at this time      ROS:A comprehensive review of systems was negative except for: above    Objective: Intake and Output:   Current Shift:   10/25 0701 - 10/25 1500  In: 0   Out: 800 [Urine:800]  Last three shifts:   10/24 0701 - 10/25 0700  In: 1128 [P.O.:240; I.V.:838]  Out: 1550 [Urine:1550]        Hemodynamic parameters for last 24 hours:  [unfilled]    Physical Exam:   Patient Vitals for the past 24 hrs:   BP Temp Temp src Pulse Resp SpO2 Weight   10/25/21 1200 (!) 100/58 98.2 °F (36.8 °C) Oral 112 21 93 %    10/25/21 1141      92 %    10/25/21 0817 (!) 108/57   106  93 %    10/25/21 0803      (!) 88 %    10/25/21 0503 135/63 97.9 °F (36.6 °C) Oral 99 22 (!) 89 %    10/25/21 0415       120 lb 5.9 oz (54.6 kg)   10/25/21 0334      91 %    10/25/21 0006      96 %    10/24/21 2356      91 %    10/24/21 2350 119/61   80 20 (!) 84 %    10/24/21 2026 120/66 98.2 °F (36.8 °C) Oral 82 22 93 %    10/24/21 2005      94 %    10/24/21 1608      91 %    10/24/21 1545 109/66 97.1 °F (36.2 °C) Axillary 85 18 93 %         Physical Exam  Vitals and nursing note reviewed. Constitutional:       General: He is not in acute distress. Appearance: Normal appearance. HENT:      Head: Normocephalic and atraumatic.       Right Ear: External ear normal.      Left Ear: External ear normal.      Nose: Nose normal.      Mouth/Throat:      Mouth: Mucous membranes are moist.      Pharynx: Oropharynx is clear. Eyes:      General:         Right eye: No discharge. Left eye: No discharge. Extraocular Movements: Extraocular movements intact. Conjunctiva/sclera: Conjunctivae normal.      Pupils: Pupils are equal, round, and reactive to light. Cardiovascular:      Rate and Rhythm: Normal rate and regular rhythm. Pulses: Normal pulses. Heart sounds: No murmur heard. Pulmonary:      Breath sounds: No stridor. No rhonchi or rales. Comments: Decreased breath sounds bilaterally  Chest:      Chest wall: No tenderness. Abdominal:      General: Bowel sounds are normal. There is no distension. Palpations: Abdomen is soft. There is no mass. Tenderness: There is no abdominal tenderness. Hernia: No hernia is present. Musculoskeletal:         General: No swelling. Normal range of motion. Cervical back: Normal range of motion. No rigidity. Skin:     General: Skin is warm and dry. Coloration: Skin is not jaundiced or pale. Neurological:      General: No focal deficit present. Mental Status: He is alert and oriented to person, place, and time. Mental status is at baseline. Cranial Nerves: No cranial nerve deficit. Sensory: No sensory deficit. Psychiatric:         Mood and Affect: Mood normal.         Behavior: Behavior normal.         Thought Content:  Thought content normal.             Labs:   Recent Labs     10/23/21  0523 10/24/21  0619 10/25/21  0553   WBC 36.3* 22.3* 30.7*   HGB 14.3 13.2* 14.0   HCT 43.9 40.7 42.0    177 196      Recent Labs     10/23/21  0523 10/23/21  0523 10/24/21  0619 10/25/21  0553     --  139 136   K 5.1   < > 5.2* 4.9  4.9     --  103 99   CO2 24  --  23 23   BUN 68*  --  83* 85*   GLUCOSE 153*  --  210* 126*    < > = values in this interval not displayed. Radiology, images personally reviewed. Last chest x-ray done 10/10/2021 showing no acute pulmonary disease      Other imaging: Not indicated      Micro: Negative growth to date    Assessment:     Principal Problem:    COVID-19  Active Problems:    Chronic kidney disease (CKD), stage IV (severe) (HCC)    Chronic lymphocytic leukemia (HCC)    Atrial fibrillation with RVR (HCC)    Centrilobular emphysema (HCC)    Pulmonary fibrosis (HCC)    Lung nodules    DM (diabetes mellitus), secondary uncontrolled ([address]    Calculus of gallbladder without cholecystitis without obstruction    Hyperkalemia  Resolved Problems:    * No resolved hospital problems. 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 If there are any questions about the content of this document, please contact the author as some errors in transcription may have occurred.

## 2021-10-25 NOTE — PLAN OF CARE
Problem: Falls - Risk of:  Goal: Will remain free from falls  Description: Will remain free from falls  10/25/2021 0715 by Indira Cazares RN  Outcome: Ongoing  10/24/2021 1841 by Yessica Villela RN  Outcome: Met This Shift     Problem: Airway Clearance - Ineffective  Goal: Achieve or maintain patent airway  10/25/2021 0715 by Indira Cazares RN  Outcome: Ongoing  Note: Swallow poor. Reinstating strict NPO status until day MDs assess. 10/24/2021 1841 by Yessica Villela RN  Outcome: Ongoing     Problem: Gas Exchange - Impaired  Goal: Absence of hypoxia  10/25/2021 0715 by Indira Cazares RN  Outcome: Ongoing  Note: Oxygen requirements increasing.   10/24/2021 1841 by Yessica V[place]la RN  Outcome: Ongoing  Goal: Promote optimal lung function  10/24/2021 1841 by Yessica Villela RN  Outcome: Ongoing     Problem: Falls - Risk of:  Goal: Absence of physical injury  Description: Absence of physical injury  10/24/2021 1841 by Yessica Villela RN  Outcome: Met This Shift

## 2021-10-25 NOTE — PROGRESS NOTES
Speech Language Pathology    Pt currently required 40lpm 100% FiO2 with NRB at 15lpm.  Pt not appropriate for PO intake / dysphagia follow-up at this time. Per chart, concern for aspiration with PO & medication shave been changed to IV form as able. Agree with strict NPO recommendation at this time. ST to continue to follow.     Marjan Ricardo M.S. [facility]  Speech-language pathologist  [id number]  Speech Desk Phone: [phone]

## 2021-10-25 NOTE — ACP (ADVANCE CARE PLANNING)
ADVANCED CARE PLANNING    Name:Henry Heinz Curling       :  1935              MRN:  [id number]  Admission Date: 10/10/2021  9:53 AM  Date of Discussion:  10/25/21      Purpose of Encounter: Advanced care planning in light of COVID 19. Parties in attendance: :Nia Reece MD, Family members: sons. Decisional Capacity:Yes      Objective/Medical Story: Patient has had a prolonged course of severe COVID and has been unable to wean any O2. Active Diagnoses: Active Hospital Problems    Diagnosis Date Noted    Hyperkalemia [E87.5] 10/19/2021    Centrilobular emphysema ([address] [J43.2] 10/17/2021    Pulmonary fibrosis ([address] [J84.10] 10/17/2021    Lung nodules [R91.8] 10/17/2021    DM (diabetes mellitus), secondary uncontrolled ([address] [E13.65] 10/17/2021    Calculus of gallbladder without cholecystitis without obstruction [K80.20] 10/17/2021    Atrial fibrillation with RVR ([address] [I48.91] 10/16/2021    COVID-19 [U07.1] 10/10/2021    Chronic kidney disease (CKD), stage IV (severe) (HCC) [N18.4] 2020    Chronic lymphocytic leukemia ([facility] [C91.10] 01/15/2020       These active diagnoses are of sufficient risk that focused discussion on advance care planning is indicated in order to allow the patient to thoughtfully consider personal goals of care; and if situations arise that prevent the ability to personally give input, to ensure appropriate representation of their personal desires for different levels and agressiveness of care. Goals of Care Determinations: Patient wishes to focus on comfort. Hospice consulted. Code Status: At this time patient wishes to be DNR/DNI         Time Spent on Advanced Planning Documents: 16 mins. The following items were considered in medical decision making:  Independent review of images , Review / order clinical lab tests. Review / order radiology tests, Decision to obtain old records.     Advanced Care Planning Documents:  Completed advances

## 2021-10-25 NOTE — PROGRESS NOTES
Narendra served Dr. Doyle Ferreira following secure message. ..     \"FYI- You ordered midodrine, but patient unable to take po meds at this time, d/t aspiration\"      [phone]:  MD aware and medication ok to hold at this time

## 2021-10-25 NOTE — PROGRESS NOTES
10/25/21 @ 16:00 added Benjamin Iraheta RN to treatment team for Palliative Care consult.  Therese Patino

## 2021-10-25 NOTE — PROGRESS NOTES
10/25/21 0803   Oxygen Therapy/Pulse Ox   O2 Device Heated high flow cannula  (WITH 15 LPM NRB MASK ON TOP)   O2 Flow Rate (L/min) 40 L/min   FiO2  100 %   SpO2 (!) 88 %   Humidification Temp 34     DROPS TO 80% WITHOUT THE NRB MASK

## 2021-10-25 NOTE — PROGRESS NOTES
Per discussion with previous RN, who cared for this patient overnight, patient appears to be aspirating, when attempting to take anything orally. All scheduled po morning medications held as a result. Discussed this with Dr. Nanette Pearce. He will make appropriate changes to all po medications, and change to IV form if available.

## 2021-10-25 NOTE — PROGRESS NOTES
Domenico Mcclelland with Hospice met at bedside with patient and family. Discussed role of Hospice care, both patient and family are on board. Another Hospice nurse, will come tomorrow and meet with patient and family again.

## 2021-10-25 NOTE — PROGRESS NOTES
Hospitalist Progress Note      PCP: No primary care provider on file. Date of Admission: 10/10/2021    Chief Complaint: Recurrent fall and shortness of breath    Hospital Course:   81 y.o. male who presented to [facility] with falls and SOB. Notes he lives alone in an assisted living apartment. States he was recently diagnosed with lung cancer. Is in the process of that work-up. Was diagnosed with COVID. He was vaccinated with both doses. He was home and became weak. He fell multiple times. Came to the ED and found to be hypoxic. Started on oxygen. He does not wear oxygen at baseline. He is feeling better. Denies fever, chills, N/V, pain. Subjective: Increasingly fatigued today. Made DNR/DNI today.  Hospice consulted      Medications:  Reviewed    Infusion Medications    heparin (PORCINE) Infusion 980 Units/hr (10/25/21 1206)    IV infusion builder 75 mL/hr at 10/24/21 1843    sodium chloride      dextrose       Scheduled Medications    pantoprazole  40 mg IntraVENous Daily    midodrine  5 mg Oral TID WC    melatonin  5 mg Oral Nightly    insulin glargine  20 Units SubCUTAneous BID    insulin lispro  0-6 Units SubCUTAneous TID WC    insulin lispro  0-3 Units SubCUTAneous Nightly    methylPREDNISolone  40 mg IntraVENous Daily    baricitinib  1 mg Oral Daily    budesonide-formoterol  2 puff Inhalation BID    [Held by provider] digoxin  0.0625 mg Oral Q48H    dilTIAZem  120 mg Oral Daily    influenza virus vaccine  0.5 mL IntraMUSCular Prior to discharge    [Held by provider] aspirin  81 mg Oral Daily    [Held by provider] escitalopram  10 mg Oral Daily    [Held by provider] rosuvastatin  40 mg Oral Daily    [Held by provider] tamsulosin  0.4 mg Oral Daily    sodium chloride flush  5-40 mL IntraVENous 2 times per day     PRN Meds: heparin (porcine), heparin (porcine), morphine, guaiFENesin-dextromethorphan, levalbuterol, perflutren lipid microspheres, prochlorperazine, sodium chloride flush, sodium chloride, polyethylene glycol, acetaminophen **OR** acetaminophen, glucose, dextrose, glucagon (rDNA), dextrose      Intake/Output Summary (Last 24 hours) at 10/25/2021 1620  Last data filed at 10/25/2021 1602  Gross per 24 hour   Intake 1188 ml   Output 1550 ml   Net -362 ml       Physical Exam Performed:    /61   Pulse 94   Temp 97.7 °F (36.5 °C) (Axillary)   Resp 19   Ht 5' 8\" (1.727 m)   Wt 120 lb 5.9 oz (54.6 kg)   SpO2 93%   BMI 18.30 kg/m²     General appearance: No apparent distress, appears stated age and cooperative. HEENT: Pupils equal, round, and reactive to light. Conjunctivae/corneas clear. Neck: Supple, with full range of motion. No jugular venous distention. Trachea midline. Respiratory:  Normal respiratory effort. Clear to auscultation, bilaterally without Rales/Wheezes/Rhonchi. Cardiovascular: Regular rate and rhythm with normal S1/S2 without murmurs, rubs or gallops. Abdomen: Soft, non-tender, non-distended with normal bowel sounds. Musculoskeletal: No clubbing, cyanosis or edema bilaterally. Full range of motion without deformity. Skin: Skin color, texture, turgor normal.  No rashes or lesions. Neurologic:  Neurovascularly intact without any focal sensory/motor deficits.  Cranial nerves: II-XII intact, grossly non-focal.  Psychiatric: Alert and oriented, thought content appropriate, normal insight  Capillary Refill: Brisk,3 seconds, normal   Peripheral Pulses: +2 palpable, equal bilaterally       Labs:   Recent Labs     10/23/21  0523 10/24/21  0619 10/25/21  0553   WBC 36.3* 22.3* 30.7*   HGB 14.3 13.2* 14.0   HCT 43.9 40.7 42.0    177 196     Recent Labs     10/23/21  0523 10/23/21  0523 10/24/21  0619 10/25/21  0553     --  139 136   K 5.1   < > 5.2* 4.9  4.9     --  103 99   CO2 24  --  23 23   BUN 68*  --  83* 85*   CREATININE 2.1*  --  2.6* 2.7*   CALCIUM 9.2  --  8.9 8.6   PHOS  --   --   --  5.0*    < > = values in this interval not displayed. Recent Labs     10/23/21  0523 10/24/21  0619 10/25/21  0553   AST 41* 33 46*   ALT 38 34 36   BILITOT 0.9 0.7 0.9   ALKPHOS 89 82 89     Recent Labs     10/25/21  1115   INR 1.38*     Recent Labs     10/25/21  0553   CKTOTAL 452*       Urinalysis:      Lab Results   Component Value Date    NITRU Negative 10/25/2021    WBCUA 0-2 10/25/2021    BACTERIA Rare 10/25/2021    RBCUA 5-10 10/25/2021    BLOODU MODERATE 10/25/2021    SPECGRAV 1.020 10/25/2021    GLUCOSEU 100 10/25/2021       Radiology:  XR CHEST PORTABLE   Final Result   Diffuse interstitial opacity bilaterally is not significantly changed as   compared to prior. VL Extremity Venous Bilateral   Final Result      XR THORACIC SPINE (MIN 4 VIEWS)   Final Result   Multiple old compression fractures status post vertebral body enhancement T10   and T12      No acute fracture definitely identified         XR CHEST PORTABLE   Final Result   No acute cardiopulmonary disease      Stable interstitial opacities, likely fibrotic. Assessment/Plan:    Active Hospital Problems    Diagnosis     Hyperkalemia [E87.5]     Centrilobular emphysema ([address] [J43.2]     Pulmonary fibrosis ([address] [J84.10]     Lung nodules [R91.8]     DM (diabetes mellitus), secondary uncontrolled ([address] [E13.65]     Calculus of gallbladder without cholecystitis without obstruction [K80.20]     Atrial fibrillation with RVR (HCC) [I48.91]     COVID-19 [U07.1]     Chronic kidney disease (CKD), stage IV (severe) (HCC) [N18.4]     Chronic lymphocytic leukemia (HCC) [C91.10]      Acute hypoxic respiratory failure 2/2 COVID 19 pneumonia  - out of isolation today  - s/p baricitinib  - continue IV steroids  - pulm consulted  - wean O2 as able  - hospice consulted    DMII  - poorly controlled  - SSI ordered    Afib with RVR  - currently rate controlled  - holding cardizem. PRN metoprolol IV ordered  - heparin gtt for now.  Will stop once full comfort measures initiated    CLL  - hem/onc consulted  - supportive care    HLD  - holding home statin    LEONOR on CKD  - nephrology consulted  - continue IVF  - continue to monitor    Chronic diastolic heart failure  - appears euvolemic  - echo with EF 55-60%  - monitor while getting IVF    Dysphagia  - SLP eval  - NPO    DVT Prophylaxis: Eliquis  Diet: Diet NPO Exceptions are: Other (Specify); Specify Other Exceptions: low volume puree pleasure feeds  ADULT ORAL NUTRITION SUPPLEMENT; Breakfast, Lunch, Dinner; Frozen Oral Supplement  ADULT ORAL NUTRITION SUPPLEMENT; Breakfast, Lunch, Dinner; Frozen Oral Supplement  Code Status: Limited    PT/OT Eval Status: Seen and evaluated recommended SNF    Dispo - hospice consulted.  Will likely transition to full comfort care tomorrow    Demond Mary MD

## 2021-10-25 NOTE — FLOWSHEET NOTE
10/25/21 1227   Encounter Summary   Services provided to: Patient   Referral/Consult From: Multi-disciplinary team   Support System Children   Continue Visiting   (10-25, initial, process thoughts about death.)   Complexity of Encounter Moderate   Length of Encounter 30 minutes   Grief and Life Adjustment   Type Adjustment to illness   Assessment Calm; Approachable   Intervention Active listening;Explored feelings, thoughts, concerns; Discussed afterlife; Discussed illness/injury and it's impact   Outcome Engaged in conversation;Expressed feelings/needs/concerns;Receptive    visit to offer support and assess spiritual/emotional needs. Patient on mask and difficult to understand,  But states he wants to go home to be with his wife (). Patient states he is ready to go. We asked if he would want to be put on a breathing machine, he stated he doesn't think so. He states he does not have and spiritual-Mormon beliefs that inform his thinking or medical decision making. Patient states he has no fears or other concerns and doesn't know what keeps him going. I offered an opportunity for patient to share any thoughts and feelings about his illness. Palliative care would be a potentially useful adjunct. Continue prn support.

## 2021-10-25 NOTE — CARE COORDINATION
CM following. Per Dr. Ailin Mesa, palliative care referral. Now wanting hospice. Dr. Ailin Mesa anticipates GIP. CM will continue to follow. CM called Jonnathan in palliative care to check to see if she has spoken with family. 4:22 PM  CM spoke with Dr. Ailin Mesa, family agreeable to hospice. They do not have a preference. ERICK called Mica Everett at [facility] with referral. She will call cm back to make sure they have a later person on call.

## 2021-10-26 NOTE — PROGRESS NOTES
High dose Heparin GTT:  APTT at 1744 is 131.7sec. Infusion paused at [address] per nursing. Will restart at 8.2mL/hr at 2045. Recheck the aPTT at 0300 10/26. Desired aPTT range is 60-90 sec. On an AntiXa washout (Eliquis) before changing to Anti-Xa monitoring of labs. ADWOA Bowser [facility] PharmD 10/25/2021 8:50 PM    10/26/2021    aPTT 54.8 sec   - Heparin _2180_ units IVP bolus and then increase the Heparin infusion to _930_ units/hr. - Recheck aPTT in 6 hours at 1030.   Chris Obregon PharmD    10/26/2021 4:21 AM

## 2021-10-26 NOTE — PROGRESS NOTES
Oxygen decreased to 13L HFNC. Pt awake, alert and oriented. Denies discomfort, denies pain. Will monitor.

## 2021-10-26 NOTE — PROGRESS NOTES
Juancho José and watch given to pt's son, Callie Serna. Per Callie Serna, wedding band and dentures should go with pt to  home after pt passes. Per son, clothes and shoes are to be thrown away.

## 2021-10-26 NOTE — H&P
Discharge/Readmit under hospice services for terminal comfort care. Please refer to prior discharge summary for additional information.     Ramirez Shah MD

## 2021-10-26 NOTE — RT PROTOCOL NOTE
RT Inhaler-Nebulizer Bronchodilator Protocol Note    There is a bronchodilator order in the chart from a provider indicating to follow the RT Bronchodilator Protocol and there is an Initiate RT Inhaler-Nebulizer Bronchodilator Protocol order as well (see protocol at bottom of note). CXR Findings:  No results found. The findings from the last RT Protocol Assessment were as follows:   History Pulmonary Disease: Smoker 15 pack years or more  Respiratory Pattern: Regular pattern and RR 12-20 bpm  Breath Sounds: Slightly diminished and/or crackles  Cough: Strong, spontaneous, non-productive  Indication for Bronchodilator Therapy: None  Bronchodilator Assessment Score: 3    Aerosolized bronchodilator medication orders have been revised according to the RT Inhaler-Nebulizer Bronchodilator Protocol below. Respiratory Therapist to perform RT Therapy Protocol Assessment initially then follow the protocol. Repeat RT Therapy Protocol Assessment PRN for score 0-3 or on second treatment, BID, and PRN for scores above 3. No Indications - adjust the frequency to every 6 hours PRN wheezing or bronchospasm, if no treatments needed after 48 hours then discontinue using Per Protocol order mode. If indication present, adjust the RT bronchodilator orders based on the Bronchodilator Assessment Score as indicated below. Use Inhaler orders unless patient has one or more of the following: on home nebulizer, not able to hold breath for 10 seconds, is not alert and oriented, cannot activate and use MDI correctly, or respiratory rate 25 breaths per minute or more, then use the equivalent nebulizer order(s) with same Frequency and PRN reasons based on the score. If a patient is on this medication at home then do not decrease Frequency below that used at home.     0-3 - enter or revise RT bronchodilator order(s) to equivalent RT Bronchodilator order with Frequency of every 4 hours PRN for wheezing or increased work of breathing using Per Protocol order mode. 4-6 - enter or revise RT Bronchodilator order(s) to two equivalent RT bronchodilator orders with one order with BID Frequency and one order with Frequency of every 4 hours PRN wheezing or increased work of breathing using Per Protocol order mode. 7-10 - enter or revise RT Bronchodilator order(s) to two equivalent RT bronchodilator orders with one order with TID Frequency and one order with Frequency of every 4 hours PRN wheezing or increased work of breathing using Per Protocol order mode. 11-13 - enter or revise RT Bronchodilator order(s) to one equivalent RT bronchodilator order with QID Frequency and an Albuterol order with Frequency of every 4 hours PRN wheezing or increased work of breathing using Per Protocol order mode. Greater than 13 - enter or revise RT Bronchodilator order(s) to one equivalent RT bronchodilator order with every 4 hours Frequency and an Albuterol order with Frequency of every 2 hours PRN wheezing or increased work of breathing using Per Protocol order mode. RT to enter RT Home Evaluation for COPD & MDI Assessment order using Per Protocol order mode.     Electronically signed by Gabe Albrecht RCP on 10/26/2021 at 1:40 PM

## 2021-10-26 NOTE — PROGRESS NOTES
10/26/21 1225   Oxygen Therapy/Pulse Ox   O2 Therapy Oxygen humidified   O2 Device Heated high flow cannula  (and NRB mask)   O2 Flow Rate (L/min) 40 L/min   FiO2  100 %   SpO2 91 %

## 2021-10-26 NOTE — FLOWSHEET NOTE
Visit with Diandra Guevara after second attempt to connect with family again. Hospice call from [facility] stating patient withdraw of care was imminent. Family accepting, I visited with son, Diandra Guevara, who has many of his own health complications. I prayed with Daindra Guevara, spoke with him about one of his own sons that has special needs that is taking the EOL of patient hard, expressed that Diandra Guevara could give blanket provided by volunteers to grandson as token of comfort. Spiritual care is here to support family as requested. Diandra Guevara expressed gratitude for [address] visit. Donovan Barrow  [phone]       10/26/21 1430   Encounter Summary   Services provided to: Patient; Family   Referral/Consult From: Patient; Family   Support System Children  (Son, Diandra Guevara, present. Son [phone] had to go to work.)   Continue Visiting   (10/26: prayer, Psalm and comforting convo, prayer blanket)   Complexity of Encounter High   Length of Encounter 30 minutes   Spiritual Assessment Completed Yes   Grief and Life Adjustment   Type End of life; Hospice   Assessment Approachable;Tearful;Grieving   Intervention Nurtured hope; Active listening;Prayer;Scripture; Discussed relationship with God;Discussed meaning/purpose;Discussed afterlife; Discussed death; End of life care;Grief care   Outcome Expressed gratitude; Connection/belonging;Coping;Engaged in conversation

## 2021-10-26 NOTE — DISCHARGE SUMMARY
Hospital Medicine Discharge Summary    Patient ID: Perry Faustin      Patient's PCP: No primary care provider on file. Admit Date: 10/10/2021     Discharge Date: 10/26/2021      Admitting Physician: Carlitos Oden MD     Discharge Physician: Tracy Stover MD     Discharge Diagnoses: Active Hospital Problems    Diagnosis     Hyperkalemia [E87.5]     Centrilobular emphysema ([address] [J43.2]     Pulmonary fibrosis ([address] [J84.10]     Lung nodules [R91.8]     DM (diabetes mellitus), secondary uncontrolled ([address] [E13.65]     Calculus of gallbladder without cholecystitis without obstruction [K80.20]     Atrial fibrillation with RVR (HCC) [I48.91]     COVID-19 [U07.1]     Chronic kidney disease (CKD), stage IV (severe) (HCC) [N18.4]     Chronic lymphocytic leukemia ([facility] [C91.10]        The patient was seen and examined on day of discharge and this discharge summary is in conjunction with any daily progress note from day of discharge. Hospital Course:   81 y. o. male who presented to [facility] with falls and SOB. Notes he lives alone in an assisted living apartment. States he was recently diagnosed with lung cancer. Is in the process of that work-up. Was diagnosed with COVID. He was vaccinated with both doses. He was home and became weak. He fell multiple times. Came to the ED and found to be hypoxic. Started on oxygen. He does not wear oxygen at baseline. He is feeling better.  Denies fever, chills, N/V, pain.     Acute hypoxic respiratory failure 2/2 COVID 19 pneumonia  - out of isolation today  - s/p baricitinib  - continue IV steroids  - pulm consulted  - wean O2 as able  - hospice consulted  - comfort measures initiated     DMII  - poorly controlled  - SSI ordered     Afib with RVR  - currently rate controlled  - holding cardizem  - stopped heparin gtt     CLL  - hem/onc consulted  - supportive care     HLD  - holding home statin     LEONOR on CKD  - nephrology consulted  - continued IVF     Chronic diastolic heart failure  - appears euvolemic  - echo with EF 55-60%     Dysphagia  - SLP eval    Physical Exam Performed:     BP (!) 109/58   Pulse 88   Temp 97.1 °F (36.2 °C) (Oral)   Resp 16   Ht 5' 8\" (1.727 m)   Wt 122 lb 12.7 oz (55.7 kg)   SpO2 91%   BMI 18.67 kg/m²       General appearance: No apparent distress, appears stated age and cooperative. HEENT: Pupils equal, round, and reactive to light. Conjunctivae/corneas clear. Neck: Supple, with full range of motion. No jugular venous distention. Trachea midline. Respiratory:  Normal respiratory effort. Clear to auscultation, bilaterally without Rales/Wheezes/Rhonchi. Cardiovascular: Regular rate and rhythm with normal S1/S2 without murmurs, rubs or gallops. Abdomen: Soft, non-tender, non-distended with normal bowel sounds. Musculoskeletal: No clubbing, cyanosis or edema bilaterally. Full range of motion without deformity. Skin: Skin color, texture, turgor normal.  No rashes or lesions. Neurologic:  Neurovascularly intact without any focal sensory/motor deficits. Cranial nerves: II-XII intact, grossly non-focal.  Psychiatric: Alert and oriented, thought content appropriate, normal insight  Capillary Refill: Brisk,3 seconds, normal   Peripheral Pulses: +2 palpable, equal bilaterally     Labs: For convenience and continuity at follow-up the following most recent labs are provided:      CBC:    Lab Results   Component Value Date    WBC 18.2 10/26/2021    HGB 11.7 10/26/2021    HCT 35.1 10/26/2021     10/26/2021       Renal:    Lab Results   Component Value Date     10/26/2021    K 3.9 10/26/2021    CL 99 10/26/2021    CO2 22 10/26/2021    BUN 83 10/26/2021    CREATININE 2.5 10/26/2021    CALCIUM 7.6 10/26/2021    PHOS 5.0 10/25/2021         Significant Diagnostic Studies    Radiology:   XR CHEST PORTABLE   Final Result   Diffuse interstitial opacity bilaterally is not significantly changed as   compared to prior. VL Extremity Venous Bilateral   Final Result      XR THORACIC SPINE (MIN 4 VIEWS)   Final Result   Multiple old compression fractures status post vertebral body enhancement T10   and T12      No acute fracture definitely identified         XR CHEST PORTABLE   Final Result   No acute cardiopulmonary disease      Stable interstitial opacities, likely fibrotic. Consults:     IP CONSULT TO HOSPITALIST  IP CONSULT TO ONCOLOGY  IP CONSULT TO INFECTIOUS DISEASES  IP CONSULT TO CARDIOLOGY  IP CONSULT TO PULMONOLOGY  IP CONSULT TO PALLIATIVE CARE  IP CONSULT TO NEPHROLOGY  IP CONSULT TO DIETITIAN  IP CONSULT TO PALLIATIVE CARE  IP CONSULT TO HOSPICE    Disposition:  Hospice     Condition at Discharge: Terminal    Discharge Instructions/Follow-up:  N/A    Code Status:  DNR-CC     Activity: activity as tolerated    Diet: regular diet      Discharge Medications:     Discharge Medication List as of 10/26/2021 12:39 PM           Details   rosuvastatin (CRESTOR) 40 MG tablet TAKE ONE TABLET BY MOUTH DAILY, Disp-90 tablet, R-1Normal      escitalopram (LEXAPRO) 10 MG tablet TAKE ONE TABLET BY MOUTH DAILY, Disp-30 tablet, R-11Normal      tamsulosin (FLOMAX) 0.4 MG capsule Take 0.4 mg by mouth dailyHistorical Med      Cholecalciferol (VITAMIN D3) 2000 units CAPS Take by mouthHistorical Med      aspirin 81 MG chewable tablet Take 81 mg by mouth daily.         TOUJEO SOLOSTAR 300 UNIT/ML SOPN INJECT 10 UNITS UNDER THE SKIN DAILY, Disp-5 pen, R-4Normal      B-D UF III MINI PEN NEEDLES 31G X 5 MM MISC Disp-100 each, R-4, Normal      TRUE METRIX BLOOD GLUCOSE TEST strip USE TO TEST BLOOD SUGAR TWO TIMES A DAY FOR DIABETES, Disp-100 strip, R-5Normal      acetaminophen (TYLENOL) 500 MG tablet Take 500 mg by mouth every 6 hours as needed for PainHistorical Med      Blood Glucose Monitoring Suppl (TRUE METRIX AIR GLUCOSE METER) ANDRES E11.65, Disp-1 Device, R-0      Blood Glucose Monitoring Suppl (TRUE METRIX AIR GLUCOSE METER) W/DEVICE KIT Dispense Blood Glucose monitoring kit for diabetes ICD-10-CM: E11.65, Disp-1 kit, R-0      Lancets MISC Disp-100 each, R-3, NormalTest one time daily             Time Spent on discharge is more than 30 minutes in the examination, evaluation, counseling and review of medications and discharge plan. Signed:    Sienna Florian MD   10/26/2021      Thank you No primary care provider on file. for the opportunity to be involved in this patient's care. If you have any questions or concerns please feel free to contact me at [phone].

## 2021-10-26 NOTE — PROGRESS NOTES
[facility]    Met with patient and family to discuss hospice philosophy and services. They are agreeable to hospice and plan is:    Partner patient at [facility]. Consents completed. Discussed with Dr. Larry Sheth who placed orders. Notified clinical supervisor who said she would also reach out to Registration. Updated staff nurse and ERICK Edge. Thank you for the opportunity to serve this patient and family. Marly Douglas RN, [address], [address] [place] [address]   O: [phone]  C: [phone]  F: [phone]   Main & Referrals: [phone]   [facility]. [facility]

## 2021-10-27 NOTE — PROGRESS NOTES
CMU called stating pt was in 30's on SpO2. Upon entry into room pt was awake and appeared in distress. Moved pt up to 13L HFNC and provided with PRN morphine and Ativan. SpO2 slowly increasing and pt now at rest in bed.

## 2021-10-27 NOTE — PROGRESS NOTES
Pt is now on 8L HFNC. Oral care provided and pt tolerated well. Repositioned and provided with PRN morphine and ativan d/t agitation and grimace following repositioning. Pt appears to now be more comfortable at this time.

## 2021-10-27 NOTE — PROGRESS NOTES
Pt on 6 liters of oxygen. Pt repositioned and turned. Mouth care completed. Hospice nurse at bedside.

## 2021-10-27 NOTE — PROGRESS NOTES
Pt passed away at 4:45 PM. Confirmed by two RN's each listening for heart and breath sounds for one full minute each. Dr. Ann Dowell notified of pt's passing. Dr Ann Dowell at bedside at 5 PM. Pt's son, Olaf Tyler, notified of pt's passing. Family given opportunity to visit and pay their respects. Family declines at this time. Personal belongings/valuables already sent home, yesterday, with Olaf Tyler. Security notified.   [facility]  home in [address] [place] [address] notified per family request.

## 2021-10-27 NOTE — PROGRESS NOTES
Spoke to Hospice Nurse for update. After providing update I re-entered the room to pt stirring in bed with grimace. Pt awoke to my voice where he voiced \"help\" PRN morphine and ativan provided at this time. Oral care also performed, pt tolerated well. Now back to resting on 11L HFNC.

## 2021-10-27 NOTE — PROGRESS NOTES
Pt remains resting comfortable in bed on 11L HFNC at this time. Will continue to reassess as needed for PRN comfort meds.

## 2021-10-27 NOTE — PROGRESS NOTES
Patient assessed. Appears comfortable and not awakening during assessment. 5 doses of morphine and ativan since midnight. O2 now on 7L and will continue to wean down. Discussed with nurse. Will call family and update. Halle Luu RN, [address], [address] [place] [address]   O: [phone]  C: [phone]  F: [phone]   Main & Referrals: [phone]   [facility]. [facility]

## 2021-10-28 NOTE — DISCHARGE SUMMARY
Hospital Medicine Discharge Summary    Patient ID: Jaye Jones      Patient's PCP: No primary care provider on file. Admitting Physician: Jolanta Mcbride MD  Discharge Physician: Jolanta Mcbride MD     Admit Date: 10/26/2021     Disposition:     Discharge Diagnoses: There are no active hospital problems to display for this patient. Date of Death: 10/27/2021  Time of Death: 1645    Immediate Cause of Death: respiratory failure  Underlying Conditions:COVID 19  Other Contributing Conditions: CLL    Hospital Course:   81 y. o. male who presented to [facility] with falls and SOB. Notes he lives alone in an assisted living apartment. States he was recently diagnosed with lung cancer. Is in the process of that work-up. Was diagnosed with COVID. He was vaccinated with both doses. He was home and became weak. He fell multiple times. Came to the ED and found to be hypoxic. Started on oxygen. He does not wear oxygen at baseline. He is feeling better. Denies fever, chills, N/V, pain.      Acute hypoxic respiratory failure 2/2 COVID 19 pneumonia  - out of isolation today  - s/p baricitinib  - continue IV steroids  - pulm consulted  - wean O2 as able  - hospice consulted  - comfort measures initiated      DMII  - poorly controlled  - SSI ordered     Afib with RVR  - currently rate controlled  - holding cardizem  - stopped heparin gtt     CLL  - hem/onc consulted  - supportive care     HLD  - holding home statin     LEONOR on CKD  - nephrology consulted  - continued IVF     Chronic diastolic heart failure  - appears euvolemic  - echo with EF 55-60%     Dysphagia  - SLP eval    Consults:  IP CONSULT TO HOSPICE  IP CONSULT TO HOSPITALIST    Signed:  Jolanta Mcbride MD MD   10/28/2021    Thank you No primary care provider on file. for the opportunity to be involved in this patient's care. If you have any questions or concerns please feel free to contact me at [phone].

## 2025-06-19 NOTE — ED NOTES
Pt ok to d/c to home. Pt given d/c instructions. Pt and son verbalized understating including Rx and follow up care. Pt wheeled to sons car for ride home.  0 s/s of distress at time of d/c.          Rand Cisse RN  10/07/21 4778 Scheduled date of EGD/colonoscopy (as of today): Sept 15, 2025  Physician performing EGD/colonoscopy: Dr. Dean  Location of EGD/colonoscopy: [facility]   Desired bowel prep reviewed with patient: mauri/dulc   Instructions reviewed with patient by: MARCELO   Clearances:   Medina Virgen

## (undated) DEVICE — Device: Brand: MEDEX

## (undated) DEVICE — GLOVE ORANGE PI 7   MSG9070

## (undated) DEVICE — SYRINGE BLB 50CC IRRIG PLIABLE FNGR FLNG GRAD FLSK DISP

## (undated) DEVICE — 20 ML SYRINGE LUER-LOCK TIP: Brand: MONOJECT

## (undated) DEVICE — CHLORAPREP 26ML ORANGE

## (undated) DEVICE — MINOR SET UP: Brand: MEDLINE INDUSTRIES, INC.

## (undated) DEVICE — GUIDEWIRE ENDOSCP L150CM DIA0.035IN TIP 3CM PTFE NIT

## (undated) DEVICE — HIGH PRESSURE NEPHROSTOMY BALLOON CATHETER KIT: Brand: NEPHROMAX KIT

## (undated) DEVICE — SYRINGE MED 10ML LUERLOCK TIP W/O SFTY DISP

## (undated) DEVICE — GAUZE,SPONGE,4"X4",8PLY,STRL,LF,10/TRAY: Brand: MEDLINE

## (undated) DEVICE — BAG DRNGE 2000ML ROUNDED TEARDROP W/ ANTIREFLX CHMBR DISP

## (undated) DEVICE — COOK-COPE LOOP NEPHROSTOMY CATHETER: Brand: COOK-COPE LOOP

## (undated) DEVICE — DRAPE,NEPHROSCOPY,STERILE: Brand: MEDLINE

## (undated) DEVICE — KIT PRB L350MM DIA3.9MM BLU W/ STONE CTCH DISP SWISS

## (undated) DEVICE — SOLUTION IRRIG 2000ML 0.9% SOD CHL USP UROMATIC PLAS CONT

## (undated) DEVICE — 1016 S-DRAPE IRRIG POUCH 10/BOX: Brand: STERI-DRAPE™

## (undated) DEVICE — INTENDED FOR TISSUE SEPARATION, AND OTHER PROCEDURES THAT REQUIRE A SHARP SURGICAL BLADE TO PUNCTURE OR CUT.: Brand: BARD-PARKER ® CARBON RIB-BACK BLADES

## (undated) DEVICE — CRADLE POS PRONE 24 X 5 X 3 IN ARM N COMPR NO CVR FOAM DISP

## (undated) DEVICE — SHEET,DRAPE,53X77,STERILE: Brand: MEDLINE

## (undated) DEVICE — CATHETER,URETHRAL,REDRUBBER,STRL,18FR: Brand: MEDLINE

## (undated) DEVICE — SUTURE PERMA-HAND SZ 2-0 L30IN NONABSORBABLE BLK L26MM SH K833H

## (undated) DEVICE — GOWN SIRUS NONREIN XL W/TWL: Brand: MEDLINE INDUSTRIES, INC.

## (undated) DEVICE — Y-TYPE TUR/BLADDER IRRIGATION SET, REGULATING CLAMP

## (undated) DEVICE — CATHETER URETH 16FR 5CC BLLN SIL ELASTMR F 2 W

## (undated) DEVICE — CATCHER STONE TBNG ADPT SWISS LITHOCLAST

## (undated) DEVICE — DRAPE,U/ SHT,SPLIT,PLAS,STERIL: Brand: MEDLINE

## (undated) DEVICE — SYRINGE MED 30ML STD CLR PLAS LUERLOCK TIP N CTRL DISP

## (undated) DEVICE — OCCLUSION BALLOON CATHETER: Brand: OCCLUDER